# Patient Record
Sex: FEMALE | Race: WHITE | NOT HISPANIC OR LATINO | Employment: FULL TIME | URBAN - METROPOLITAN AREA
[De-identification: names, ages, dates, MRNs, and addresses within clinical notes are randomized per-mention and may not be internally consistent; named-entity substitution may affect disease eponyms.]

---

## 2019-04-12 ENCOUNTER — OFFICE VISIT (OUTPATIENT)
Dept: URGENT CARE | Age: 29
End: 2019-04-12
Payer: COMMERCIAL

## 2019-04-12 VITALS
DIASTOLIC BLOOD PRESSURE: 67 MMHG | OXYGEN SATURATION: 100 % | SYSTOLIC BLOOD PRESSURE: 130 MMHG | TEMPERATURE: 98 F | HEART RATE: 70 BPM | HEIGHT: 62 IN | RESPIRATION RATE: 18 BRPM | BODY MASS INDEX: 26.87 KG/M2 | WEIGHT: 146 LBS

## 2019-04-12 DIAGNOSIS — J02.9 ACUTE PHARYNGITIS, UNSPECIFIED ETIOLOGY: Primary | ICD-10-CM

## 2019-04-12 LAB — S PYO AG THROAT QL: NEGATIVE

## 2019-04-12 PROCEDURE — S9083 URGENT CARE CENTER GLOBAL: HCPCS | Performed by: FAMILY MEDICINE

## 2019-04-12 PROCEDURE — G0382 LEV 3 HOSP TYPE B ED VISIT: HCPCS | Performed by: FAMILY MEDICINE

## 2019-04-12 PROCEDURE — 87430 STREP A AG IA: CPT | Performed by: FAMILY MEDICINE

## 2019-04-12 RX ORDER — AMOXICILLIN 500 MG/1
500 CAPSULE ORAL EVERY 12 HOURS SCHEDULED
Qty: 20 CAPSULE | Refills: 0 | Status: SHIPPED | OUTPATIENT
Start: 2019-04-12 | End: 2019-04-22

## 2020-09-03 ENCOUNTER — HOSPITAL ENCOUNTER (OUTPATIENT)
Facility: HOSPITAL | Age: 30
Setting detail: OBSERVATION
Discharge: HOME | End: 2020-09-04
Admitting: INTERNAL MEDICINE
Payer: COMMERCIAL

## 2020-09-03 DIAGNOSIS — I49.1 PREMATURE ATRIAL COMPLEXES: ICD-10-CM

## 2020-09-03 DIAGNOSIS — R07.9 CHEST PAIN: Primary | ICD-10-CM

## 2020-09-03 DIAGNOSIS — F41.9 ANXIETY: ICD-10-CM

## 2020-09-03 DIAGNOSIS — I49.1 PAC (PREMATURE ATRIAL CONTRACTION): ICD-10-CM

## 2020-09-03 DIAGNOSIS — R94.31 PROLONGED Q-T INTERVAL ON ECG: ICD-10-CM

## 2020-09-03 PROBLEM — I49.9 ARRHYTHMIA: Status: ACTIVE | Noted: 2020-09-03

## 2020-09-03 LAB
ALBUMIN SERPL BCP-MCNC: 4.9 G/DL (ref 3.4–4.8)
ALP SERPL-CCNC: 41.8 U/L (ref 35–140)
ALT SERPL W P-5'-P-CCNC: 22 U/L (ref 5–54)
AMPHETAMINES SERPL QL SCN: NEGATIVE
ANION GAP SERPL CALCULATED.3IONS-SCNC: 11 MMOL/L (ref 4–13)
AST SERPL W P-5'-P-CCNC: 21 U/L (ref 15–41)
BARBITURATES UR QL: NEGATIVE
BASOPHILS # BLD AUTO: 0.03 THOUSANDS/ΜL (ref 0–0.1)
BASOPHILS NFR BLD AUTO: 0 % (ref 0–1)
BENZODIAZ UR QL: NEGATIVE
BILIRUB SERPL-MCNC: 0.67 MG/DL (ref 0.3–1.2)
BUN SERPL-MCNC: 8 MG/DL (ref 6–20)
CALCIUM SERPL-MCNC: 9.7 MG/DL (ref 8.4–10.2)
CHLORIDE SERPL-SCNC: 102 MMOL/L (ref 96–108)
CHOLEST SERPL-MCNC: 197 MG/DL
CO2 SERPL-SCNC: 26 MMOL/L (ref 22–33)
COCAINE UR QL: NEGATIVE
CREAT SERPL-MCNC: 0.69 MG/DL (ref 0.4–1.1)
CRP SERPL QL: 0.1 MG/L (ref 0–1)
D DIMER PPP FEU-MCNC: <0.19 MG/L FEU (ref 0.19–0.49)
EOSINOPHIL # BLD AUTO: 0.04 THOUSAND/ΜL (ref 0–0.61)
EOSINOPHIL NFR BLD AUTO: 0 % (ref 0–6)
ERYTHROCYTE [DISTWIDTH] IN BLOOD BY AUTOMATED COUNT: 11.9 % (ref 11.6–15.1)
ERYTHROCYTE [SEDIMENTATION RATE] IN BLOOD: 16 MM/HOUR (ref 0–20)
GFR SERPL CREATININE-BSD FRML MDRD: 116 ML/MIN/1.73SQ M
GLUCOSE SERPL-MCNC: 80 MG/DL (ref 65–140)
HCT VFR BLD AUTO: 38.3 % (ref 34.8–46.1)
HDLC SERPL-MCNC: 65 MG/DL
HGB BLD-MCNC: 12.8 G/DL (ref 11.5–15.4)
IMM GRANULOCYTES # BLD AUTO: 0.01 THOUSAND/UL (ref 0–0.2)
IMM GRANULOCYTES NFR BLD AUTO: 0 % (ref 0–2)
LDLC SERPL CALC-MCNC: 118 MG/DL (ref 0–100)
LYMPHOCYTES # BLD AUTO: 2.39 THOUSANDS/ΜL (ref 0.6–4.47)
LYMPHOCYTES NFR BLD AUTO: 26 % (ref 14–44)
MAGNESIUM SERPL-MCNC: 1.8 MG/DL (ref 1.6–2.6)
MCH RBC QN AUTO: 32.6 PG (ref 26.8–34.3)
MCHC RBC AUTO-ENTMCNC: 33.4 G/DL (ref 31.4–37.4)
MCV RBC AUTO: 98 FL (ref 82–98)
METHADONE UR QL: NEGATIVE
MONOCYTES # BLD AUTO: 0.47 THOUSAND/ΜL (ref 0.17–1.22)
MONOCYTES NFR BLD AUTO: 5 % (ref 4–12)
NEUTROPHILS # BLD AUTO: 6.22 THOUSANDS/ΜL (ref 1.85–7.62)
NEUTS SEG NFR BLD AUTO: 69 % (ref 43–75)
NONHDLC SERPL-MCNC: 132 MG/DL
OPIATES UR QL SCN: NEGATIVE
OXYCODONE+OXYMORPHONE UR QL SCN: NEGATIVE
PCP UR QL: NEGATIVE
PHOSPHATE SERPL-MCNC: 3.8 MG/DL (ref 2.5–5)
PLATELET # BLD AUTO: 369 THOUSANDS/UL (ref 149–390)
PMV BLD AUTO: 9.1 FL (ref 8.9–12.7)
POTASSIUM SERPL-SCNC: 3.7 MMOL/L (ref 3.5–5)
PROT SERPL-MCNC: 7.5 G/DL (ref 6.4–8.3)
RBC # BLD AUTO: 3.93 MILLION/UL (ref 3.81–5.12)
SODIUM SERPL-SCNC: 139 MMOL/L (ref 133–145)
THC UR QL: NEGATIVE
TRIGL SERPL-MCNC: 68.2 MG/DL
TROPONIN I SERPL-MCNC: <0.03 NG/ML (ref 0–0.07)
TROPONIN I SERPL-MCNC: <0.03 NG/ML (ref 0–0.07)
TSH SERPL DL<=0.05 MIU/L-ACNC: 1.06 UIU/ML (ref 0.34–5.6)
WBC # BLD AUTO: 9.16 THOUSAND/UL (ref 4.31–10.16)

## 2020-09-03 PROCEDURE — 85025 COMPLETE CBC W/AUTO DIFF WBC: CPT | Performed by: PHYSICIAN ASSISTANT

## 2020-09-03 PROCEDURE — 84484 ASSAY OF TROPONIN QUANT: CPT | Performed by: INTERNAL MEDICINE

## 2020-09-03 PROCEDURE — 84100 ASSAY OF PHOSPHORUS: CPT | Performed by: PHYSICIAN ASSISTANT

## 2020-09-03 PROCEDURE — 99449 NTRPROF PH1/NTRNET/EHR 31/>: CPT | Performed by: EMERGENCY MEDICINE

## 2020-09-03 PROCEDURE — 99220 PR INITIAL OBSERVATION CARE/DAY 70 MINUTES: CPT | Performed by: INTERNAL MEDICINE

## 2020-09-03 PROCEDURE — 36415 COLL VENOUS BLD VENIPUNCTURE: CPT | Performed by: PHYSICIAN ASSISTANT

## 2020-09-03 PROCEDURE — 99284 EMERGENCY DEPT VISIT MOD MDM: CPT | Performed by: PHYSICIAN ASSISTANT

## 2020-09-03 PROCEDURE — 99285 EMERGENCY DEPT VISIT HI MDM: CPT

## 2020-09-03 PROCEDURE — 83735 ASSAY OF MAGNESIUM: CPT | Performed by: PHYSICIAN ASSISTANT

## 2020-09-03 PROCEDURE — 84484 ASSAY OF TROPONIN QUANT: CPT | Performed by: PHYSICIAN ASSISTANT

## 2020-09-03 PROCEDURE — 85652 RBC SED RATE AUTOMATED: CPT | Performed by: INTERNAL MEDICINE

## 2020-09-03 PROCEDURE — 86140 C-REACTIVE PROTEIN: CPT | Performed by: INTERNAL MEDICINE

## 2020-09-03 PROCEDURE — 84443 ASSAY THYROID STIM HORMONE: CPT | Performed by: PHYSICIAN ASSISTANT

## 2020-09-03 PROCEDURE — 93005 ELECTROCARDIOGRAM TRACING: CPT

## 2020-09-03 PROCEDURE — 80307 DRUG TEST PRSMV CHEM ANLYZR: CPT | Performed by: PHYSICIAN ASSISTANT

## 2020-09-03 PROCEDURE — 85379 FIBRIN DEGRADATION QUANT: CPT | Performed by: PHYSICIAN ASSISTANT

## 2020-09-03 PROCEDURE — 80053 COMPREHEN METABOLIC PANEL: CPT | Performed by: PHYSICIAN ASSISTANT

## 2020-09-03 PROCEDURE — 80061 LIPID PANEL: CPT | Performed by: INTERNAL MEDICINE

## 2020-09-03 RX ORDER — ACETAMINOPHEN 325 MG/1
650 TABLET ORAL EVERY 4 HOURS PRN
Status: DISCONTINUED | OUTPATIENT
Start: 2020-09-03 | End: 2020-09-04 | Stop reason: HOSPADM

## 2020-09-03 RX ORDER — POTASSIUM CHLORIDE 20 MEQ/1
20 TABLET, EXTENDED RELEASE ORAL ONCE
Status: COMPLETED | OUTPATIENT
Start: 2020-09-03 | End: 2020-09-03

## 2020-09-03 RX ORDER — MULTIVITAMIN
1 TABLET ORAL DAILY
COMMUNITY

## 2020-09-03 RX ORDER — CALCIUM GLUCONATE 20 MG/ML
1 INJECTION, SOLUTION INTRAVENOUS ONCE
Status: DISCONTINUED | OUTPATIENT
Start: 2020-09-03 | End: 2020-09-04 | Stop reason: HOSPADM

## 2020-09-03 RX ADMIN — POTASSIUM CHLORIDE 20 MEQ: 1500 TABLET, EXTENDED RELEASE ORAL at 17:50

## 2020-09-03 RX ADMIN — ENOXAPARIN SODIUM 40 MG: 40 INJECTION SUBCUTANEOUS at 17:50

## 2020-09-03 NOTE — PLAN OF CARE
Problem: DISCHARGE PLANNING  Goal: Discharge to home or other facility with appropriate resources  Description: INTERVENTIONS:  - Identify barriers to discharge w/patient and caregiver  - Arrange for needed discharge resources and transportation as appropriate  - Identify discharge learning needs (meds, wound care, etc )  - Arrange for interpretive services to assist at discharge as needed  - Refer to Case Management Department for coordinating discharge planning if the patient needs post-hospital services based on physician/advanced practitioner order or complex needs related to functional status, cognitive ability, or social support system  Outcome: Progressing     Problem: Knowledge Deficit  Goal: Patient/family/caregiver demonstrates understanding of disease process, treatment plan, medications, and discharge instructions  Description: Complete learning assessment and assess knowledge base  Interventions:  - Provide teaching at level of understanding  - Provide teaching via preferred learning methods  Outcome: Progressing     Problem: NEUROSENSORY - ADULT  Goal: Achieves stable or improved neurological status  Description: INTERVENTIONS  - Monitor and report changes in neurological status  - Monitor vital signs such as temperature, blood pressure, glucose, and any other labs ordered   - Initiate measures to prevent increased intracranial pressure  - Monitor for seizure activity and implement precautions if appropriate      Outcome: Progressing  Goal: Achieves maximal functionality and self care  Description: INTERVENTIONS  - Monitor swallowing and airway patency with patient fatigue and changes in neurological status  - Encourage and assist patient to increase activity and self care     - Encourage visually impaired, hearing impaired and aphasic patients to use assistive/communication devices  Outcome: Progressing     Problem: CARDIOVASCULAR - ADULT  Goal: Maintains optimal cardiac output and hemodynamic stability  Description: INTERVENTIONS:  - Monitor I/O, vital signs and rhythm  - Monitor for S/S and trends of decreased cardiac output  - Administer and titrate ordered vasoactive medications to optimize hemodynamic stability  - Assess quality of pulses, skin color and temperature  - Assess for signs of decreased coronary artery perfusion  - Instruct patient to report change in severity of symptoms  Outcome: Progressing  Goal: Absence of cardiac dysrhythmias or at baseline rhythm  Description: INTERVENTIONS:  - Continuous cardiac monitoring, vital signs, obtain 12 lead EKG if ordered  - Administer antiarrhythmic and heart rate control medications as ordered  - Monitor electrolytes and administer replacement therapy as ordered  Outcome: Progressing

## 2020-09-03 NOTE — ASSESSMENT & PLAN NOTE
Chest pain, 4/10 in intensity and chest tightness, palpitation  Tingling feeling at her arms and right legs  Had been taking ""Maximum Oxy Elite Thermogenic Fat Burner" which contains caffeine, last use was 4 days ago  Troponin and D-dimer at presentation are negative  EKG showed PACs and mild QTc prolongation  Likely due to Anxiety vs  Panic attack vs  Unlikely ACS Vs  Unlikely Caffeine intoxication   -Medical toxicology has been consulted over the phone at ED, input appreciated  -24 hours telemetry  -repeat Troponin and EKG  -CRP and ESR, lipid panels     -echo

## 2020-09-03 NOTE — ASSESSMENT & PLAN NOTE
Hx of PAC since 2012  EKG today showed mild QTc prolongation  Electrolytes are unremarkable  -will monitor EKG

## 2020-09-03 NOTE — CONSULTS
PHONE [facility] 1981 Toxicology  Vi Tobin 31 y o  female MRN: [id number]  Unit/Bed#: ED 10 Encounter: [id number]      Reason for Consult / Principal Problem: Chest pain    Inpatient consult to Toxicology  Consult performed by: Kalani Cobos MD  Consult ordered by: Bhaskar Ritchie PA-C        09/03/20      ASSESSMENT:  1) Chest pain  2) Palpitations  3) Prolonged QTc on EKG  4) Paresthesias    DISCUSSION/ RECOMMENDATIONS:  The patient presented this afternoon with complaints of chest pain, palpitations, and paresthesias which started this morning  Noted in the emergency department to have PAC's and mildly prolonged QTc on EKG  She had started taking a supplement called "Maximum Oxy Elite Thermogenic Fat Burner" two weeks ago, but last took it four days ago  She is not on any medications at home  I was consulted regarding whether the supplement could be cause of the patient's presentation today  I was able to review the supplement ingredients  Caffeine toxicity could certainly cause these clinical findings, especially secondary to hypokalemia, however I would not expect these effects to be seen more than 24 hours after ingestion  I would not expect any of the other ingredients to cause this overall picture  I do not think that the patient's presentation today is related to the supplement use assuming she is providing an accurate history  Please continue to pursue other possible causes  For further questions, please call [facility]  Service or Patient Access Center to reach the medical  on call  Hx and PE limited by the dynamics of a phone consultation  I have not personally interviewed or evaluated the patient, but only advised based on the information provided to me  Primary provider is responsible for all clinical decisions       Pertinent history, physical exam and clinical findings and course discussed: Vi Tobin is a 31y o  year old female who presents with chest pain, palpitations, paresthesias onset today and was found to have some PAC's and mildly prolonged QTc on workup  Electrolytes normal   Patient had been taking a supplement as noted above, starting 2 weeks ago with last use four days ago  Not on any home medications and did not endorse any other ingestions  Vital signs stable  Review of systems and physical exam not performed by me  Historical Information   History reviewed  No pertinent past medical history  History reviewed  No pertinent surgical history  Social History   Social History     Substance and Sexual Activity   Alcohol Use Yes     Social History     Substance and Sexual Activity   Drug Use Not Currently     Social History     Tobacco Use   Smoking Status Never Smoker   Smokeless Tobacco Never Used     History reviewed  No pertinent family history  Prior to Admission medications    Medication Sig Start Date End Date Taking? Authorizing Provider   Multiple Vitamin (multivitamin) tablet Take 1 tablet by mouth daily   Yes Historical Provider, MD       Current Facility-Administered Medications   Medication Dose Route Frequency    calcium gluconate 1 g in sodium chloride 0 9% 50 mL (premix)  1 g Intravenous Once       No Known Allergies    Objective     No intake or output data in the 24 hours ending 09/03/20 1413    Invasive Devices:   Peripheral IV 09/03/20 Left Hand (Active)   Site Assessment Clean;Dry; Intact 09/03/20 1304   Dressing Type Transparent 09/03/20 1304   Line Status Blood return noted; Saline locked; Flushed 09/03/20 1304       Vitals   Vitals:    09/03/20 1300 09/03/20 1306   BP:  137/75   TempSrc: Tympanic    Pulse: 77    Resp: 18    Temp: 98 8 °F (37 1 °C)          EKG, Pathology, and/or Other Studies: I have personally reviewed pertinent reports  Lab Results: I have personally reviewed pertinent reports        Labs:  Results from last 7 days   Lab Units 09/03/20  1319   WBC Thousand/uL 9 16 HEMOGLOBIN g/dL 12 8   HEMATOCRIT % 38 3   PLATELETS Thousands/uL 369   NEUTROS PCT % 69   LYMPHS PCT % 26   MONOS PCT % 5      Results from last 7 days   Lab Units 09/03/20  1319   POTASSIUM mmol/L 3 7   CHLORIDE mmol/L 102   CO2 mmol/L 26   BUN mg/dL 8   CREATININE mg/dL 0 69   CALCIUM mg/dL 9 7   ALK PHOS U/L 41 8   ALT U/L 22   AST U/L 21   MAGNESIUM mg/dL 1 8   PHOSPHORUS mg/dL 3 8              0   Lab Value Date/Time    TROPONINI <0 03 09/03/2020 1319             Invalid input(s): EXTPREGUR      Counseling / Coordination of Care  Total time spent today 31 minutes   This was a phone consultation

## 2020-09-03 NOTE — H&P
H&P- Emeka Grant 1990, 31 y o  female MRN: [id number]    Unit/Bed#: -01 Encounter: [id number]    Primary Care Provider: No primary care provider on file  Date and time admitted to hospital: 9/3/2020 12:57 PM        PAC (premature atrial contraction)  Assessment & Plan  Hx of PAC since 2012  EKG today showed mild QTc prolongation  Electrolytes are unremarkable  -will monitor EKG    * Chest pain  Assessment & Plan  Chest pain, 4/10 in intensity and chest tightness, palpitation  Tingling feeling at her arms and right legs  Had been taking ""Maximum Oxy Elite Thermogenic Fat Burner" which contains caffeine, last use was 4 days ago  Troponin and D-dimer at presentation are negative  EKG showed PACs and mild QTc prolongation  Likely due to Anxiety vs  Panic attack vs  Unlikely ACS Vs  Unlikely Caffeine intoxication  -24 hours telemetry  -repeat Troponin and EKG  -CRP and ESR, lipid panels  -echo         VTE Prophylaxis: Enoxaparin (Lovenox)  / sequential compression device   Code Status: Full code  POLST: There is no POLST form on file for this patient (pre-hospital)  Discussion with family: yes    Anticipated Length of Stay:  Patient will be admitted on an Observation basis with an anticipated length of stay of  1 midnights  Justification for Hospital Stay: chest pain      Chief Complaint:   Chest pain and tightness    History of Present Illness:    Emeka Grant is a 31 y o  female with past medical history of PACs and anxiety who presents with chest pain and chest tightness  Chest pain and tightness started this morning, 4/10 in intensity, constant, associated with dizziness, palpitation and nausea  Nothing makes the pain better or worse  Patient denies fever, shortness of breath, chills and persirations  Patient states is that she has had tingling sensation on bilateral arms and right lower leg for a couple days   Patient also stated she had been taking "Maximum Oxy Elite Thermogenic Fat Burner" which contains caffeine, last use was 4 days ago  At ED, EKG showed PACs and mild QTc  Troponin I negative  Review of Systems:    Review of Systems   Constitutional: Negative for chills and fever  HENT: Negative for congestion, rhinorrhea, sneezing and sore throat  Eyes: Negative for pain and discharge  Respiratory: Positive for chest tightness  Negative for cough, shortness of breath and wheezing  Cardiovascular: Positive for chest pain and palpitations  Negative for leg swelling  Gastrointestinal: Positive for nausea  Negative for abdominal pain and vomiting  Endocrine: Negative for polydipsia, polyphagia and polyuria  Genitourinary: Negative for flank pain, frequency and urgency  Musculoskeletal: Negative for arthralgias, back pain and joint swelling  Skin: Negative for color change and pallor  Neurological: Positive for dizziness  Negative for weakness, light-headedness and headaches  Psychiatric/Behavioral: Negative for agitation and confusion  The patient is nervous/anxious  Past Medical and Surgical History:     History reviewed  No pertinent past medical history  History reviewed  No pertinent surgical history  Meds/Allergies:    Prior to Admission medications    Medication Sig Start Date End Date Taking? Authorizing Provider   Multiple Vitamin (multivitamin) tablet Take 1 tablet by mouth daily   Yes Historical Provider, MD     I have reviewed home medications with patient personally      Allergies: No Known Allergies    Social History:     Marital Status: /Civil Union   Occupation:      Patient Pre-hospital Level of Mobility: Ambulatory    Substance Use History:   Social History     Substance and Sexual Activity   Alcohol Use Yes     Social History     Tobacco Use   Smoking Status Never Smoker   Smokeless Tobacco Never Used     Social History     Substance and Sexual Activity   Drug Use Not Currently       Family History:    History reviewed  No pertinent family history  Physical Exam:     Vitals:   Blood Pressure: 116/73 (09/03/20 1501)  Pulse: 72 (09/03/20 1501)  Temperature: 98 8 °F (37 1 °C) (09/03/20 1501)  Temp Source: Tympanic (09/03/20 1501)  Respirations: 18 (09/03/20 1501)  Weight - Scale: 60 6 kg (133 lb 11 2 oz) (09/03/20 1300)  SpO2: 95 % (09/03/20 1501)    Physical Exam  HENT:      Head: Normocephalic  Eyes:      Pupils: Pupils are equal, round, and reactive to light  Neck:      Musculoskeletal: Normal range of motion  Cardiovascular:      Rate and Rhythm: Normal rate and regular rhythm  Heart sounds: No murmur  Pulmonary:      Effort: Pulmonary effort is normal  No respiratory distress  Breath sounds: No wheezing or rales  Abdominal:      Palpations: Abdomen is soft  Tenderness: There is no abdominal tenderness  Musculoskeletal: Normal range of motion  General: No swelling  Skin:     General: Skin is warm  Neurological:      Mental Status: She is alert and oriented to person, place, and time  Psychiatric:         Mood and Affect: Mood normal              Additional Data:     Lab Results: I have personally reviewed pertinent reports  Results from last 7 days   Lab Units 09/03/20  1319   WBC Thousand/uL 9 16   HEMOGLOBIN g/dL 12 8   HEMATOCRIT % 38 3   PLATELETS Thousands/uL 369   NEUTROS PCT % 69   LYMPHS PCT % 26   MONOS PCT % 5   EOS PCT % 0     Results from last 7 days   Lab Units 09/03/20  1319   SODIUM mmol/L 139   POTASSIUM mmol/L 3 7   CHLORIDE mmol/L 102   CO2 mmol/L 26   BUN mg/dL 8   CREATININE mg/dL 0 69   ANION GAP mmol/L 11   CALCIUM mg/dL 9 7   ALBUMIN g/dL 4 9*   TOTAL BILIRUBIN mg/dL 0 67   ALK PHOS U/L 41 8   ALT U/L 22   AST U/L 21   GLUCOSE RANDOM mg/dL 80                       Imaging: I have personally reviewed pertinent reports        No orders to display       EKG, Pathology, and Other Studies Reviewed on Admission:   · EKG: PACs and mild QTc prolongation     Allscripts / Epic Records Reviewed: Yes     ** Please Note: This note has been constructed using a voice recognition system   **

## 2020-09-03 NOTE — ED PROVIDER NOTES
History  Chief Complaint   Patient presents with    Chest Pain     "chest pressure" and L arm tingling, since 1030, started while working at desk, (+) nausea, "I keep seeing light and dark"     69-year-old female with history of anxiety comes in today complaining of substernal chest pressure with bilateral left greater than right arm tingling, right leg tingling, dizziness and nausea that started this morning with intermittent visual disturbance that she describes as her vision getting light and dark  She reports that the chest pressure is nonradiating  She denies palpitations but feels as though her "heart is not beating enough "  She reports that she recently was taking diet pills for the last 2 weeks which she stopped taking on Sunday  She denies any concomitant use of laxatives, no self-induced vomiting or diarrhea  She was doing exercises on Monday including pushups  She denies any significant past medical history  No family history of heart disease  Not a smoker  Prior to Admission Medications   Prescriptions Last Dose Informant Patient Reported? Taking? Multiple Vitamin (multivitamin) tablet 9/2/2020 at Unknown time  Yes Yes   Sig: Take 1 tablet by mouth daily      Facility-Administered Medications: None       History reviewed  No pertinent past medical history  History reviewed  No pertinent surgical history  History reviewed  No pertinent family history  I have reviewed and agree with the history as documented  E-Cigarette/Vaping    E-Cigarette Use Never User      E-Cigarette/Vaping Substances     Social History     Tobacco Use    Smoking status: Never Smoker    Smokeless tobacco: Never Used   Substance Use Topics    Alcohol use: Yes    Drug use: Not Currently       Review of Systems   Constitutional: Negative for activity change  Eyes: Negative for visual disturbance  Respiratory: Negative for shortness of breath  Cardiovascular: Positive for chest pain  Musculoskeletal: Negative for back pain  Skin: Negative for color change  Neurological: Negative for dizziness and headaches  Psychiatric/Behavioral: Negative for behavioral problems  Physical Exam  Physical Exam  Constitutional:       General: She is in acute distress (Mildly anxious appearing)  Appearance: She is well-developed and normal weight  HENT:      Head: Normocephalic and atraumatic  Eyes:      Conjunctiva/sclera: Conjunctivae normal    Cardiovascular:      Rate and Rhythm: Normal rate  Rhythm irregular  Extrasystoles are present  Heart sounds: Normal heart sounds  Pulmonary:      Effort: Pulmonary effort is normal  No respiratory distress  Breath sounds: Normal breath sounds  Chest:      Chest wall: Tenderness (Reproduces complaint) present  Abdominal:      General: Bowel sounds are normal       Palpations: Abdomen is soft  Musculoskeletal: Normal range of motion  Skin:     General: Skin is warm and dry  Findings: No rash  Neurological:      Mental Status: She is alert and oriented to person, place, and time     Psychiatric:         Mood and Affect: Mood normal          Behavior: Behavior normal          Vital Signs  ED Triage Vitals   Temperature Pulse Respirations Blood Pressure SpO2   09/03/20 1300 09/03/20 1300 09/03/20 1300 09/03/20 1306 09/03/20 1300   98 8 °F (37 1 °C) 77 18 137/75 97 %      Temp Source Heart Rate Source Patient Position - Orthostatic VS BP Location FiO2 (%)   09/03/20 1300 09/03/20 1300 -- -- --   Tympanic Monitor         Pain Score       09/03/20 1300       4           Vitals:    09/03/20 1300 09/03/20 1306   BP:  137/75   Pulse: 77          Visual Acuity      ED Medications  Medications   calcium gluconate 1 g in sodium chloride 0 9% 50 mL (premix) (0 g Intravenous Hold 9/3/20 1330)   acetaminophen (TYLENOL) tablet 650 mg (has no administration in time range)   enoxaparin (LOVENOX) subcutaneous injection 40 mg (has no administration in time range)       Diagnostic Studies  Results Reviewed     Procedure Component Value Units Date/Time    Sedimentation rate, automated [[id number]]     Lab Status:  No result Specimen:  Blood     C-reactive protein [[id number]]     Lab Status:  No result Specimen:  Blood     Lipid panel [[id number]]     Lab Status:  No result Specimen:  Blood     Rapid drug screen, urine [[id number]] Collected:  09/03/20 1403    Lab Status: In process Specimen:  Urine, Clean Catch Updated:  09/03/20 1409    TSH [[id number]]  (Normal) Collected:  09/03/20 1319    Lab Status:  Final result Specimen:  Blood from Arm, Left Updated:  09/03/20 1407     TSH 3RD GENERATON 1 056 uIU/mL     Narrative:       Patients undergoing fluorescein dye angiography may retain small amounts of fluorescein in the body for 48-72 hours post procedure  Samples containing fluorescein can produce falsely depressed TSH values  If the patient had this procedure,a specimen should be resubmitted post fluorescein clearance        D-Dimer [[id number]]  (Abnormal) Collected:  09/03/20 1319    Lab Status:  Final result Specimen:  Blood from Arm, Left Updated:  09/03/20 1352     D-Dimer, Quant  <0 19 mg/L FEU     Comprehensive metabolic panel [[id number]]  (Abnormal) Collected:  09/03/20 1319    Lab Status:  Final result Specimen:  Blood from Arm, Left Updated:  09/03/20 1346     Sodium 139 mmol/L      Potassium 3 7 mmol/L      Chloride 102 mmol/L      CO2 26 mmol/L      ANION GAP 11 mmol/L      BUN 8 mg/dL      Creatinine 0 69 mg/dL      Glucose 80 mg/dL      Calcium 9 7 mg/dL      AST 21 U/L      ALT 22 U/L      Alkaline Phosphatase 41 8 U/L      Total Protein 7 5 g/dL      Albumin 4 9 g/dL      Total Bilirubin 0 67 mg/dL      eGFR 116 ml/min/1 73sq m     Narrative:       [facility] guidelines for Chronic Kidney Disease (CKD):     Stage 1 with normal or high GFR (GFR > 90 mL/min/1 73 square meters)    Stage 2 Mild CKD (GFR = 60-89 mL/min/1 73 square meters)    Stage 3A Moderate CKD (GFR = 45-59 mL/min/1 73 square meters)    Stage 3B Moderate CKD (GFR = 30-44 mL/min/1 73 square meters)    Stage 4 Severe CKD (GFR = 15-29 mL/min/1 73 square meters)    Stage 5 End Stage CKD (GFR <15 mL/min/1 73 square meters)  Note: GFR calculation is accurate only with a steady state creatinine    Troponin I [[id number]]  (Normal) Collected:  09/03/20 1319    Lab Status:  Final result Specimen:  Blood from Arm, Left Updated:  09/03/20 1346     Troponin I <0 03 ng/mL     Magnesium [[id number]]  (Normal) Collected:  09/03/20 1319    Lab Status:  Final result Specimen:  Blood from Arm, Left Updated:  09/03/20 1346     Magnesium 1 8 mg/dL     Phosphorus [[id number]]  (Normal) Collected:  09/03/20 1319    Lab Status:  Final result Specimen:  Blood from Arm, Left Updated:  09/03/20 1346     Phosphorus 3 8 mg/dL     CBC and differential [[id number]]  (Normal) Collected:  09/03/20 1319    Lab Status:  Final result Specimen:  Blood from Arm, Left Updated:  09/03/20 1331     WBC 9 16 Thousand/uL      RBC 3 93 Million/uL      Hemoglobin 12 8 g/dL      Hematocrit 38 3 %      MCV 98 fL      MCH 32 6 pg      MCHC 33 4 g/dL      RDW 11 9 %      MPV 9 1 fL      Platelets 094 Thousands/uL      Neutrophils Relative 69 %      Immat GRANS % 0 %      Lymphocytes Relative 26 %      Monocytes Relative 5 %      Eosinophils Relative 0 %      Basophils Relative 0 %      Neutrophils Absolute 6 22 Thousands/µL      Immature Grans Absolute 0 01 Thousand/uL      Lymphocytes Absolute 2 39 Thousands/µL      Monocytes Absolute 0 47 Thousand/µL      Eosinophils Absolute 0 04 Thousand/µL      Basophils Absolute 0 03 Thousands/µL                  No orders to display              Procedures  Procedures         ED Course  ED Course as of Sep 03 1426   Thu Sep 03, 2020   1305 EKG performed at 12:58 p m   Shows normal sinus rhythm with rate of 95, normal axis, no ectopy, no significant ST elevations, however there is a prolonged QT with a QTC of 507      1316 EKG performed at 1:14 p m  Shows normal sinus rhythm with a rate of 81, normal axis, no ectopy, frequent PACs with compensatory pause, prolonged QT      1408 Speaking with Dr Radha Whitney, toxicology does not think the supplements are contributing to her prolonged QT or PACs especially 4 days later  MDM      Disposition  Final diagnoses:   Chest pain   Prolonged Q-T interval on ECG   Premature atrial complexes     Time reflects when diagnosis was documented in both MDM as applicable and the Disposition within this note     Time User Action Codes Description Comment    9/3/2020  2:22 PM Viona Phy Add [R07 9] Chest pain     9/3/2020  2:23 PM Viona Phy Add [R94 31] Prolonged Q-T interval on ECG     9/3/2020  2:24 PM Viona Phy Add [I49 1] Premature atrial complexes       ED Disposition     ED Disposition Condition Date/Time Comment    Admit Stable Thu Sep 3, 2020  2:22 PM Case was discussed with Dr Tigist Hendricks and the patient's admission status was agreed to be Admission Status: observation status to the service of Dr Tigist Hendricks   Follow-up Information    None         Patient's Medications   Discharge Prescriptions    No medications on file     No discharge procedures on file      PDMP Review     None          ED Provider  Electronically Signed by           Bri Daniels PA-C  09/03/20 4660

## 2020-09-04 ENCOUNTER — APPOINTMENT (OUTPATIENT)
Dept: NON INVASIVE DIAGNOSTICS | Facility: HOSPITAL | Age: 30
End: 2020-09-04
Payer: COMMERCIAL

## 2020-09-04 VITALS
OXYGEN SATURATION: 98 % | HEIGHT: 62 IN | SYSTOLIC BLOOD PRESSURE: 111 MMHG | HEART RATE: 76 BPM | TEMPERATURE: 97.4 F | BODY MASS INDEX: 24.56 KG/M2 | WEIGHT: 133.47 LBS | DIASTOLIC BLOOD PRESSURE: 67 MMHG | RESPIRATION RATE: 16 BRPM

## 2020-09-04 LAB
ATRIAL RATE: 83 BPM
ATRIAL RATE: 94 BPM
P AXIS: 43 DEGREES
P AXIS: 74 DEGREES
PR INTERVAL: 151 MS
PR INTERVAL: 186 MS
QRS AXIS: 73 DEGREES
QRS AXIS: 75 DEGREES
QRSD INTERVAL: 88 MS
QRSD INTERVAL: 91 MS
QT INTERVAL: 403 MS
QT INTERVAL: 411 MS
QTC INTERVAL: 477 MS
QTC INTERVAL: 507 MS
T WAVE AXIS: 34 DEGREES
T WAVE AXIS: 56 DEGREES
VENTRICULAR RATE: 81 BPM
VENTRICULAR RATE: 95 BPM

## 2020-09-04 PROCEDURE — 93306 TTE W/DOPPLER COMPLETE: CPT

## 2020-09-04 PROCEDURE — 99217 PR OBSERVATION CARE DISCHARGE MANAGEMENT: CPT | Performed by: INTERNAL MEDICINE

## 2020-09-04 PROCEDURE — 93306 TTE W/DOPPLER COMPLETE: CPT | Performed by: INTERNAL MEDICINE

## 2020-09-04 PROCEDURE — 93010 ELECTROCARDIOGRAM REPORT: CPT | Performed by: INTERNAL MEDICINE

## 2020-09-04 RX ORDER — ESCITALOPRAM OXALATE 10 MG/1
10 TABLET ORAL DAILY
Status: DISCONTINUED | OUTPATIENT
Start: 2020-09-04 | End: 2020-09-04 | Stop reason: HOSPADM

## 2020-09-04 RX ORDER — ALPRAZOLAM 0.25 MG/1
0.25 TABLET ORAL 3 TIMES DAILY PRN
Qty: 20 TABLET | Refills: 0 | Status: SHIPPED | OUTPATIENT
Start: 2020-09-04 | End: 2022-05-16

## 2020-09-04 RX ORDER — ALPRAZOLAM 0.25 MG/1
0.25 TABLET ORAL 3 TIMES DAILY PRN
Status: DISCONTINUED | OUTPATIENT
Start: 2020-09-04 | End: 2020-09-04 | Stop reason: HOSPADM

## 2020-09-04 RX ADMIN — ENOXAPARIN SODIUM 40 MG: 40 INJECTION SUBCUTANEOUS at 08:52

## 2020-09-04 RX ADMIN — ESCITALOPRAM OXALATE 10 MG: 10 TABLET ORAL at 12:30

## 2020-09-04 NOTE — DISCHARGE SUMMARY
Discharge Summary - Mignon Hernandez 31 y o  female MRN: [id number]    Unit/Bed#: -01 Encounter: [id number]    Admission Date:   Admission Orders (From admission, onward)     Ordered        09/03/20 1420  Place in Observation  Once                     Admitting Diagnosis: Blurred vision [H53 8]  Premature atrial complexes [I49 1]  Chest pain [R07 9]  Chest pressure [R07 89]  Prolonged Q-T interval on ECG [R94 31]  Numbness and tingling in left arm [R20 0, R20 2]    HPI:   Patient is a 48-year-old female with history of PACs and anxiety presents with complaints of midsternal chest pain, states that the pain is is for intensity 4/10 sharp in nature no specific aggravating or relieving factors  Patient also states that she has tingling sensation in the feet and both the hands and also associated with dizziness and palpitation and blurry vision  Patient denies shortness of breath or fever or chills or cough  Patient has also been supplement which contains caffeine and vitamin B12  Patient states that she has been stressed out at work  Procedures Performed: No orders of the defined types were placed in this encounter  Summary of Hospital Course:  Patient was admitted and observed for more than 24 hours  Chest pain subsided  Troponin trended and remained negative  No EKG changes  No major abnormalities on telemetry  QTC prolongation of normalized  Echocardiogram done  EF of 60%  No regional wall motion abnormality  No right-sided systolic dysfunction  No major valvular abnormalities  Blood work reviewed  TSH, CRP, ESR are within normal limit  Electrolytes within normal limit  She was evaluated by Psychiatric for anxiety and depression  Patient needs to have outpatient follow-up for the same  She was given a script for metoprolol 12 5 b i d  And Xanax  Patient is advised to follow-up with primary care physician regarding hospitalization        Complications: None    Discharge Diagnosis: Atypical chest pain, anxiety, occasional PACs    Resolved Problems  Date Reviewed: 9/3/2020    None          Condition at Discharge: good         Discharge instructions/Information to patient and family:   See after visit summary for information provided to patient and family  Provisions for Follow-Up Care:  See after visit summary for information related to follow-up care and any pertinent home health orders  PCP: No primary care provider on file  Disposition: Home    Planned Readmission: NO    Discharge Statement   I spent 45 minutes discharging the patient  This time was spent on the day of discharge  I had direct contact with the patient on the day of discharge  Additional documentation is required if more than 30 minutes were spent on discharge  Discharge Medications:  See after visit summary for reconciled discharge medications provided to patient and family

## 2020-09-04 NOTE — NURSING NOTE
Reviewed pts discharge instructions  Reveiwed with pt her follow up appts  And explained to pt how to set up for [facility] my chart to keep track of all her health care needs  Using teachback method RN educated pt on her new medications, doses, times and side effects  Pt asked appropriate questions and showed learning of medications  SLIM MD discussed with pt her ECHO results   at bedside

## 2020-09-04 NOTE — PROGRESS NOTES
RN noted irregularity with pt's heart beat upon am ascultation/assessment    SLIM attending made aware  No new orders at this time  [facility] ANTOINE  Pt's significant other present at bedside  Call bell with in reach

## 2020-09-04 NOTE — PLAN OF CARE
Problem: DISCHARGE PLANNING  Goal: Discharge to home or other facility with appropriate resources  Description: INTERVENTIONS:  - Identify barriers to discharge w/patient and caregiver  - Arrange for needed discharge resources and transportation as appropriate  - Identify discharge learning needs (meds, wound care, etc )  - Arrange for interpretive services to assist at discharge as needed  - Refer to Case Management Department for coordinating discharge planning if the patient needs post-hospital services based on physician/advanced practitioner order or complex needs related to functional status, cognitive ability, or social support system  9/4/2020 1344 by Isabelle Carrasco RN  Outcome: Adequate for Discharge  9/4/2020 0805 by Isabelle Carrasco RN  Outcome: Progressing     Problem: Knowledge Deficit  Goal: Patient/family/caregiver demonstrates understanding of disease process, treatment plan, medications, and discharge instructions  Description: Complete learning assessment and assess knowledge base    Interventions:  - Provide teaching at level of understanding  - Provide teaching via preferred learning methods  9/4/2020 1344 by Isabelle Carrasco RN  Outcome: Adequate for Discharge  9/4/2020 0805 by Isabelle Carrasco RN  Outcome: Progressing     Problem: NEUROSENSORY - ADULT  Goal: Achieves stable or improved neurological status  Description: INTERVENTIONS  - Monitor and report changes in neurological status  - Monitor vital signs such as temperature, blood pressure, glucose, and any other labs ordered   - Initiate measures to prevent increased intracranial pressure  - Monitor for seizure activity and implement precautions if appropriate      9/4/2020 1344 by Isabelle Carrasco RN  Outcome: Adequate for Discharge  9/4/2020 0805 by Isabelle Carrasco RN  Outcome: Progressing  Goal: Achieves maximal functionality and self care  Description: INTERVENTIONS  - Monitor swallowing and airway patency with patient fatigue and changes in neurological status  - Encourage and assist patient to increase activity and self care     - Encourage visually impaired, hearing impaired and aphasic patients to use assistive/communication devices  9/4/2020 1344 by Elaine Soler RN  Outcome: Adequate for Discharge  9/4/2020 0805 by Elaine Soler RN  Outcome: Progressing     Problem: CARDIOVASCULAR - ADULT  Goal: Maintains optimal cardiac output and hemodynamic stability  Description: INTERVENTIONS:  - Monitor I/O, vital signs and rhythm  - Monitor for S/S and trends of decreased cardiac output  - Administer and titrate ordered vasoactive medications to optimize hemodynamic stability  - Assess quality of pulses, skin color and temperature  - Assess for signs of decreased coronary artery perfusion  - Instruct patient to report change in severity of symptoms  9/4/2020 1344 by Elaine Soler RN  Outcome: Adequate for Discharge  9/4/2020 0805 by Elaine Soler RN  Outcome: Progressing  Goal: Absence of cardiac dysrhythmias or at baseline rhythm  Description: INTERVENTIONS:  - Continuous cardiac monitoring, vital signs, obtain 12 lead EKG if ordered  - Administer antiarrhythmic and heart rate control medications as ordered  - Monitor electrolytes and administer replacement therapy as ordered  9/4/2020 1344 by Elaine Soler RN  Outcome: Adequate for Discharge  9/4/2020 0805 by Elaine Soler RN  Outcome: Progressing

## 2020-09-04 NOTE — CONSULTS
Consultation - [facility] 31 y o  female MRN: [id number]    Unit/Bed#: -01 Encounter: [id number]      Identifying Data:  31years old female is admitted at [facility] with complaining of chest pain psychiatric consultation is asked for depression anxiety and stress  Patient examined spoke with the nurse discussed with medical attending Dr Kiesha Alicea and resident physician  Reportedly physically patient seems to be okay and she has no chronic medical conditions she exercise and takes vitamins she is not on any medications at home  Spoke with the  at bedside and patient examined she seems little minimizing her problem but she was able to tell me that she has ongoing work stress even though she likes her job and she has this job since 2 years this is a  job and she feels that it is responsible job and that makes her stress level high  She also worries about the kids and there schooling with all this COVID-19 pandemic is going on  She also gives a history of she is not talking with her father since 3 years and parents have been  mother left him  Otherwise patient has a happy marriage they are  for 13 years they have 2 daughters and they get along pretty good  Patient admits taking Xanax for anxiety in 2012 for several months to handle the anxiety and stress of work 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 and she was worried to make a mistake in passing the meds etc   Social history patient lives with the  and her 2 daughters patient denies smoking denies abusing alcohol or drugs patient's drug screen is negative and alcohol level was not done  Patient had some college education and she is currently working  Patient has no history of substance abuse no legal problems in the past     Chief Complaints:  Chest pain and stress    Family History: History reviewed  No pertinent family history    Sister and brother both have a depression with psychiatric admissions  Patient could not provide much detail because she does not know  Legal History:  Patient denies    Mental Status Exam:  31years old white female is alert awake oriented x3 memory intact looks anxious withdrawn psychomotor retardation speech slow and low in tone she gets anxious and nervous uptight with vague somatic complaints like chest pain tingling of fingers lot of psychosomatic complaints off and on without any physical explanation  Poor sleep appetite okay no weight change  Patient denies auditory or visual hallucinations  Patient denies suicidal or homicidal ideation  Patient seems obsessive and worries about the things a front before anything bad happens  She gets anxious with somatic complaints  No full-blown panic attacks  No paranoia no delusion elucidated  Poor concentration  Insight and judgments are adequate  Patient feels sad and uptight no crying no suicidal ideation  Patient is not pregnant  History of Present Illness     HPI: González Soliman is a 31y o  year old female who presents with chest pain    Consults      Historical Information   Past Psychiatric History:  Patient seems to be having a and anxiety since long time with history of treated by family physician giving her Xanax for few months in 2012 to handle the stress and anxiety otherwise she has not seen a therapist or psychiatrist no psychiatric admissions no suicide attempts in the past no history of drug and alcohol abuse no IV drug abuse no legal problems and currently she is not under psychiatric care and she is not on any psychotropic medications  After the discussion of her condition and recommending anti depression and anxiety medication p r n  She agreed to try that we discussed the side effects of medications Lexapro and Xanax p r n  Monae Ramos Patient agreed to continue her medication with her family physician to handle the stress anxiety and obsession  Monae Ramos History reviewed   No pertinent past medical history  History reviewed  No pertinent surgical history  Social History   Social History     Substance and Sexual Activity   Alcohol Use Yes     Social History     Substance and Sexual Activity   Drug Use Not Currently     Social History     Tobacco Use   Smoking Status Never Smoker   Smokeless Tobacco Never Used       Meds/Allergies   current meds:   Current Facility-Administered Medications   Medication Dose Route Frequency    acetaminophen (TYLENOL) tablet 650 mg  650 mg Oral Q4H PRN    calcium gluconate 1 g in sodium chloride 0 9% 50 mL (premix)  1 g Intravenous Once    enoxaparin (LOVENOX) subcutaneous injection 40 mg  40 mg Subcutaneous Daily    and PTA meds:    Medications Prior to Admission   Medication    Multiple Vitamin (multivitamin) tablet     No Known Allergies    Objective   Vitals: Blood pressure 118/79, pulse 70, temperature 97 6 °F (36 4 °C), temperature source Tympanic, resp  rate 20, height 5' 2" (1 575 m), weight 60 5 kg (133 lb 7 5 oz), SpO2 95 %        Routine Lab Results:   Admission on 09/03/2020   Component Date Value Ref Range Status    WBC 09/03/2020 9 16  4 31 - 10 16 Thousand/uL Final    RBC 09/03/2020 3 93  3 81 - 5 12 Million/uL Final    Hemoglobin 09/03/2020 12 8  11 5 - 15 4 g/dL Final    Hematocrit 09/03/2020 38 3  34 8 - 46 1 % Final    MCV 09/03/2020 98  82 - 98 fL Final    MCH 09/03/2020 32 6  26 8 - 34 3 pg Final    MCHC 09/03/2020 33 4  31 4 - 37 4 g/dL Final    RDW 09/03/2020 11 9  11 6 - 15 1 % Final    MPV 09/03/2020 9 1  8 9 - 12 7 fL Final    Platelets 98/65/7421 369  149 - 390 Thousands/uL Final    Neutrophils Relative 09/03/2020 69  43 - 75 % Final    Immat GRANS % 09/03/2020 0  0 - 2 % Final    Lymphocytes Relative 09/03/2020 26  14 - 44 % Final    Monocytes Relative 09/03/2020 5  4 - 12 % Final    Eosinophils Relative 09/03/2020 0  0 - 6 % Final    Basophils Relative 09/03/2020 0  0 - 1 % Final    Neutrophils Absolute 09/03/2020 6 22  1 85 - 7 62 Thousands/µL Final    Immature Grans Absolute 09/03/2020 0 01  0 00 - 0 20 Thousand/uL Final    Lymphocytes Absolute 09/03/2020 2 39  0 60 - 4 47 Thousands/µL Final    Monocytes Absolute 09/03/2020 0 47  0 17 - 1 22 Thousand/µL Final    Eosinophils Absolute 09/03/2020 0 04  0 00 - 0 61 Thousand/µL Final    Basophils Absolute 09/03/2020 0 03  0 00 - 0 10 Thousands/µL Final    Sodium 09/03/2020 139  133 - 145 mmol/L Final    Potassium 09/03/2020 3 7  3 5 - 5 0 mmol/L Final    Chloride 09/03/2020 102  96 - 108 mmol/L Final    CO2 09/03/2020 26  22 - 33 mmol/L Final    ANION GAP 09/03/2020 11  4 - 13 mmol/L Final    BUN 09/03/2020 8  6 - 20 mg/dL Final    Creatinine 09/03/2020 0 69  0 40 - 1 10 mg/dL Final    Standardized to IDMS reference method    Glucose 09/03/2020 80  65 - 140 mg/dL Final    If the patient is fasting, the ADA then defines impaired fasting glucose as > 100 mg/dL and diabetes as > or equal to 123 mg/dL  Specimen collection should occur prior to Sulfasalazine administration due to the potential for falsely depressed results  Specimen collection should occur prior to Sulfapyridine administration due to the potential for falsely elevated results   Calcium 09/03/2020 9 7  8 4 - 10 2 mg/dL Final    AST 09/03/2020 21  15 - 41 U/L Final    Specimen collection should occur prior to Sulfasalazine administration due to the potential for falsely depressed results   ALT 09/03/2020 22  5 - 54 U/L Final    Specimen collection should occur prior to Sulfasalazine administration due to the potential for falsely depressed results       Alkaline Phosphatase 09/03/2020 41 8  35 - 140 U/L Final    Total Protein 09/03/2020 7 5  6 4 - 8 3 g/dL Final    Albumin 09/03/2020 4 9* 3 4 - 4 8 g/dL Final    Total Bilirubin 09/03/2020 0 67  0 30 - 1 20 mg/dL Final    eGFR 09/03/2020 116  ml/min/1 73sq m Final    Magnesium 09/03/2020 1 8  1 6 - 2 6 mg/dL Final    Phosphorus 09/03/2020 3 8  2 5 - 5 0 mg/dL Final    Troponin I 09/03/2020 <0 03  0 00 - 0 07 ng/mL Final    TSH 3RD GENERATON 09/03/2020 1 056  0 340 - 5 600 uIU/mL Final    The recommended reference ranges for TSH during pregnancy are as follows:   First trimester   0 05-3 7   Second trimester  0 31-4 35   Third trimester   0 41-5 18    Note: Normal ranges may not apply to patients who are transgender, non-binary, or whose legal sex, sex at birth, and gender identity differ   D-Dimer, Quant  09/03/2020 <0 19* 0 19 - 0 49 mg/L FEU Final    Reference and upper limits to exclude DVT and PE are the same  Do not use to exclude if clinical symptoms are present   Amph/Meth UR 09/03/2020 Negative  Negative Final    Barbiturate Ur 09/03/2020 Negative  Negative Final    Benzodiazepine Urine 09/03/2020 Negative  Negative Final    Cocaine Urine 09/03/2020 Negative  Negative Final    Methadone Urine 09/03/2020 Negative  Negative Final    Opiate Urine 09/03/2020 Negative  Negative Final    PCP Ur 09/03/2020 Negative  Negative Final    THC Urine 09/03/2020 Negative  Negative Final    Oxycodone Urine 09/03/2020 Negative  Negative Final    Sed Rate 09/03/2020 16  0 - 20 mm/hour Final    CRP 09/03/2020 0 1  0 0 - 1 0 mg/L Final    Cholesterol 09/03/2020 197  <=200 mg/dL Final    Cholesterol:       Desirable         <200 mg/dl       Borderline         200-239 mg/dl       High              >239           Triglycerides 09/03/2020 68 2  <=150 mg/dL Final    Triglyceride:     Normal          <150 mg/dl     Borderline High 150-199 mg/dl     High            200-499 mg/dl        Very High       >499 mg/dl    Specimen collection should occur prior to N-Acetylcysteine or Metamizole administration due to the potential for falsely depressed results      HDL, Direct 09/03/2020 65  >=50 mg/dL Final    HDL Cholesterol:       Low     <41 mg/dL    LDL Calculated 09/03/2020 118* 0 - 100 mg/dL Final    LDL Cholesterol:     Optimal           <100 mg/dl     Near Optimal      100-129 mg/dl     Above Optimal       Borderline High 130-159 mg/dl       High            160-189 mg/dl       Very High       >189 mg/dl         This screening LDL is a calculated result  It does not have the accuracy of the Direct Measured LDL in the monitoring of patients with hyperlipidemia and/or statin therapy  Direct Measure LDL (BRE086) must be ordered separately in these patients   Non-HDL-Chol (CHOL-HDL) 09/03/2020 132  mg/dl Final    Troponin I 09/03/2020 <0 03  0 00 - 0 07 ng/mL Final    Ventricular Rate 09/03/2020 81  BPM Final    Atrial Rate 09/03/2020 83  BPM Final    [place] Interval 09/03/2020 151  ms Final    QRSD Interval 09/03/2020 91  ms Final    QT Interval 09/03/2020 411  ms Final    QTC Interval 09/03/2020 477  ms Final    P Axis 09/03/2020 43  degrees Final    QRS Axis 09/03/2020 73  degrees Final    T Wave [place] 09/03/2020 56  degrees Final    Ventricular Rate 09/03/2020 95  BPM Final    Atrial Rate 09/03/2020 94  BPM Final    [place] Interval 09/03/2020 186  ms Final    QRSD Interval 09/03/2020 88  ms Final    QT Interval 09/03/2020 403  ms Final    QTC Interval 09/03/2020 507  ms Final    P Axis 09/03/2020 74  degrees Final    QRS Axis 09/03/2020 75  degrees Final    T Wave [place] 09/03/2020 34  degrees Final         Diagnosis:  Major depression recurrent  Generalized anxiety  Rule out OCD    Plan:  Patient agreed to take Lexapro and Xanax p r n   Lexapro 10 mg daily  Xanax 0 25 mg p o  Q 8 hours p r n  For anxiety  Psychotherapy  Psychiatric follow-up recommended after the discharge  Discussed with the nurse resident physician and medical attending  I will follow up during the hospital stay      Marianna Bonilla MD

## 2020-09-04 NOTE — PLAN OF CARE
Problem: DISCHARGE PLANNING  Goal: Discharge to home or other facility with appropriate resources  Description: INTERVENTIONS:  - Identify barriers to discharge w/patient and caregiver  - Arrange for needed discharge resources and transportation as appropriate  - Identify discharge learning needs (meds, wound care, etc )  - Arrange for interpretive services to assist at discharge as needed  - Refer to Case Management Department for coordinating discharge planning if the patient needs post-hospital services based on physician/advanced practitioner order or complex needs related to functional status, cognitive ability, or social support system  Outcome: Progressing     Problem: Knowledge Deficit  Goal: Patient/family/caregiver demonstrates understanding of disease process, treatment plan, medications, and discharge instructions  Description: Complete learning assessment and assess knowledge base  Interventions:  - Provide teaching at level of understanding  - Provide teaching via preferred learning methods  Outcome: Progressing     Problem: NEUROSENSORY - ADULT  Goal: Achieves stable or improved neurological status  Description: INTERVENTIONS  - Monitor and report changes in neurological status  - Monitor vital signs such as temperature, blood pressure, glucose, and any other labs ordered   - Initiate measures to prevent increased intracranial pressure  - Monitor for seizure activity and implement precautions if appropriate      Outcome: Progressing  Goal: Achieves maximal functionality and self care  Description: INTERVENTIONS  - Monitor swallowing and airway patency with patient fatigue and changes in neurological status  - Encourage and assist patient to increase activity and self care     - Encourage visually impaired, hearing impaired and aphasic patients to use assistive/communication devices  Outcome: Progressing     Problem: CARDIOVASCULAR - ADULT  Goal: Maintains optimal cardiac output and hemodynamic stability  Description: INTERVENTIONS:  - Monitor I/O, vital signs and rhythm  - Monitor for S/S and trends of decreased cardiac output  - Administer and titrate ordered vasoactive medications to optimize hemodynamic stability  - Assess quality of pulses, skin color and temperature  - Assess for signs of decreased coronary artery perfusion  - Instruct patient to report change in severity of symptoms  Outcome: Progressing  Goal: Absence of cardiac dysrhythmias or at baseline rhythm  Description: INTERVENTIONS:  - Continuous cardiac monitoring, vital signs, obtain 12 lead EKG if ordered  - Administer antiarrhythmic and heart rate control medications as ordered  - Monitor electrolytes and administer replacement therapy as ordered  Outcome: Progressing     Problem: CARDIOVASCULAR - ADULT  Goal: Maintains optimal cardiac output and hemodynamic stability  Description: INTERVENTIONS:  - Monitor I/O, vital signs and rhythm  - Monitor for S/S and trends of decreased cardiac output  - Administer and titrate ordered vasoactive medications to optimize hemodynamic stability  - Assess quality of pulses, skin color and temperature  - Assess for signs of decreased coronary artery perfusion  - Instruct patient to report change in severity of symptoms  Outcome: Progressing  Goal: Absence of cardiac dysrhythmias or at baseline rhythm  Description: INTERVENTIONS:  - Continuous cardiac monitoring, vital signs, obtain 12 lead EKG if ordered  - Administer antiarrhythmic and heart rate control medications as ordered  - Monitor electrolytes and administer replacement therapy as ordered  Outcome: Progressing

## 2020-09-09 ENCOUNTER — OFFICE VISIT (OUTPATIENT)
Dept: CARDIOLOGY CLINIC | Facility: CLINIC | Age: 30
End: 2020-09-09
Payer: COMMERCIAL

## 2020-09-09 VITALS
BODY MASS INDEX: 25.43 KG/M2 | HEART RATE: 85 BPM | DIASTOLIC BLOOD PRESSURE: 70 MMHG | HEIGHT: 62 IN | OXYGEN SATURATION: 98 % | TEMPERATURE: 98.5 F | WEIGHT: 138.2 LBS | SYSTOLIC BLOOD PRESSURE: 116 MMHG

## 2020-09-09 DIAGNOSIS — F41.9 ANXIETY: ICD-10-CM

## 2020-09-09 DIAGNOSIS — R07.9 CHEST PAIN, UNSPECIFIED TYPE: Primary | ICD-10-CM

## 2020-09-09 DIAGNOSIS — I49.1 PAC (PREMATURE ATRIAL CONTRACTION): ICD-10-CM

## 2020-09-09 PROCEDURE — 99203 OFFICE O/P NEW LOW 30 MIN: CPT | Performed by: NURSE PRACTITIONER

## 2020-09-09 NOTE — PROGRESS NOTES
Cardiology Consultation     Demar Taveras  [id number]  1990  [facility] [place]  [facility] [address]      On 9/03/20 Ms Zully Arriola presented to [facility] with complaints of chest pain  She has a known past medical history of PAC's and anxiety  She To the emergency room with midsternal sharp pain  She complained of tingling sensation in her feet both associated with dizziness and palpitations with poor vision  She had been taking a supplement which contain caffeine and vitamin B12 she is under lot of stress at work  9/04/20 TTE showed LVEF 60%, no regional wall motion abnormality, trace MR, mild TR  Troponin was negative  TSH WNL  She was placed on Metoprolol 12 5mg BID and xanax  Ms Zully Arriola presents to our office for a cardiology consultation for recent complaints of chest pain  Ms Sybil Womack is accompanied by her   She admits to experiencing mid sternal chest pain last evening with activity, 3/10, radiating down her left arm  She admits to worsening fatigue  Ms Sybil Womack started taking OTC diet pills prior to start of her symptoms  She is not longer taking diet pills  She is not taking Metoprolol as prescribed in the ED  Social ETOH Use  Denies tobacco use  Denies family hx of CAD     Patient Active Problem List   Diagnosis    Chest pain    PAC (premature atrial contraction)     No past medical history on file    Social History     Socioeconomic History    Marital status: /Civil Union     Spouse name: Not on file    Number of children: Not on file    Years of education: Not on file    Highest education level: Not on file   Occupational History    Not on file   Social Needs    Financial resource strain: Not on file    Food insecurity     Worry: Not on file     Inability: Not on file    Transportation needs     Medical: Not on file     Non-medical: Not on file Tobacco Use    Smoking status: Never Smoker    Smokeless tobacco: Never Used   Substance and Sexual Activity    Alcohol use: Yes    Drug use: Not Currently    Sexual activity: Not on file   Lifestyle    Physical activity     Days per week: Not on file     Minutes per session: Not on file    Stress: Not on file   Relationships    Social connections     Talks on phone: Not on file     Gets together: Not on file     Attends Yazidi service: Not on file     Active member of club or organization: Not on file     Attends meetings of clubs or organizations: Not on file     Relationship status: Not on file    Intimate partner violence     Fear of current or ex partner: Not on file     Emotionally abused: Not on file     Physically abused: Not on file     Forced sexual activity: Not on file   Other Topics Concern    Not on file   Social History Narrative    Not on file      No family history on file  No past surgical history on file  Current Outpatient Medications:     ALPRAZolam (XANAX) 0 25 mg tablet, Take 1 tablet (0 25 mg total) by mouth 3 (three) times a day as needed for anxiety for up to 10 days, Disp: 20 tablet, Rfl: 0    metoprolol tartrate (LOPRESSOR) 25 mg tablet, Take 0 5 tablets (12 5 mg total) by mouth every 12 (twelve) hours, Disp: 30 tablet, Rfl: 0    Multiple Vitamin (multivitamin) tablet, Take 1 tablet by mouth daily, Disp: , Rfl:   No Known Allergies  There were no vitals filed for this visit      Labs:  Admission on 09/03/2020, Discharged on 09/04/2020   Component Date Value    WBC 09/03/2020 9 16     RBC 09/03/2020 3 93     Hemoglobin 09/03/2020 12 8     Hematocrit 09/03/2020 38 3     MCV 09/03/2020 98     MCH 09/03/2020 32 6     MCHC 09/03/2020 33 4     RDW 09/03/2020 11 9     MPV 09/03/2020 9 1     Platelets 43/74/1509 369     Neutrophils Relative 09/03/2020 69     Immat GRANS % 09/03/2020 0     Lymphocytes Relative 09/03/2020 26     Monocytes Relative 09/03/2020 5  Eosinophils Relative 09/03/2020 0     Basophils Relative 09/03/2020 0     Neutrophils Absolute 09/03/2020 6 22     Immature Grans Absolute 09/03/2020 0 01     Lymphocytes Absolute 09/03/2020 2 39     Monocytes Absolute 09/03/2020 0 47     Eosinophils Absolute 09/03/2020 0 04     Basophils Absolute 09/03/2020 0 03     Sodium 09/03/2020 139     Potassium 09/03/2020 3 7     Chloride 09/03/2020 102     CO2 09/03/2020 26     ANION GAP 09/03/2020 11     BUN 09/03/2020 8     Creatinine 09/03/2020 0 69     Glucose 09/03/2020 80     Calcium 09/03/2020 9 7     AST 09/03/2020 21     ALT 09/03/2020 22     Alkaline Phosphatase 09/03/2020 41 8     Total Protein 09/03/2020 7 5     Albumin 09/03/2020 4 9*    Total Bilirubin 09/03/2020 0 67     eGFR 09/03/2020 116     Magnesium 09/03/2020 1 8     Phosphorus 09/03/2020 3 8     Troponin I 09/03/2020 <0 03     TSH 3RD GENERATON 09/03/2020 1 056     D-Dimer, Quant  09/03/2020 <0 19*    Amph/Meth UR 09/03/2020 Negative     Barbiturate Ur 09/03/2020 Negative     Benzodiazepine Urine 09/03/2020 Negative     Cocaine Urine 09/03/2020 Negative     Methadone Urine 09/03/2020 Negative     Opiate Urine 09/03/2020 Negative     PCP Ur 09/03/2020 Negative     THC Urine 09/03/2020 Negative     Oxycodone Urine 09/03/2020 Negative     Sed Rate 09/03/2020 16     CRP 09/03/2020 0 1     Cholesterol 09/03/2020 197     Triglycerides 09/03/2020 68 2     HDL, Direct 09/03/2020 65     LDL Calculated 09/03/2020 118*    Non-HDL-Chol (CHOL-HDL) 09/03/2020 132     Troponin I 09/03/2020 <0 03     Ventricular Rate 09/03/2020 81     Atrial Rate 09/03/2020 83     RI Interval 09/03/2020 151     QRSD Interval 09/03/2020 91     QT Interval 09/03/2020 411     QTC Interval 09/03/2020 477     P Axis 09/03/2020 43     QRS Axis 09/03/2020 73     T Wave [place] 09/03/2020 56     Ventricular Rate 09/03/2020 95     Atrial Rate 09/03/2020 94     RI Interval 09/03/2020 186  QRSD Interval 09/03/2020 88     QT Interval 09/03/2020 403     QTC Interval 09/03/2020 507     P Axis 09/03/2020 74     QRS Axis 09/03/2020 75     T Wave [place] 09/03/2020 34      Imaging: No results found  Review of Systems:  Review of Systems   Constitutional: Positive for fatigue  Cardiovascular: Positive for chest pain  All other systems reviewed and are negative  Physical Exam:  Physical Exam  Constitutional:       Appearance: Normal appearance  HENT:      Head: Normocephalic  Neck:      Musculoskeletal: Normal range of motion  Cardiovascular:      Rate and Rhythm: Normal rate and regular rhythm  Pulmonary:      Effort: Pulmonary effort is normal       Breath sounds: Normal breath sounds  Abdominal:      General: Abdomen is flat  Bowel sounds are normal    Musculoskeletal: Normal range of motion  General: No swelling  Skin:     Capillary Refill: Capillary refill takes less than 2 seconds  Neurological:      General: No focal deficit present  Mental Status: She is alert and oriented to person, place, and time  Psychiatric:         Mood and Affect: Mood normal          Discussion/Summary:  1  Chest pain- Occurring with activity radiating down left arm, exercise stress test R/O ischemia  2  PAC's- TSH WNL, most likely from caffeine in the diet pills  On PE RRR, Stop metoprolol  3  Anxiety- possibly related to taking diet pills with caffeine, no longer taking diet pills,  follow up with PCP, instructed on a fresh diet, avoiding packaged foods

## 2020-09-18 ENCOUNTER — HOSPITAL ENCOUNTER (OUTPATIENT)
Dept: NON INVASIVE DIAGNOSTICS | Facility: CLINIC | Age: 30
Discharge: HOME | End: 2020-09-18
Payer: COMMERCIAL

## 2020-09-18 DIAGNOSIS — R07.9 CHEST PAIN, UNSPECIFIED TYPE: ICD-10-CM

## 2020-09-18 LAB
CHEST PAIN STATEMENT: NORMAL
MAX DIASTOLIC BP: 60 MMHG
MAX HEART RATE: 184 BPM
MAX PREDICTED HEART RATE: 189 BPM
MAX. SYSTOLIC BP: 140 MMHG
PROTOCOL NAME: NORMAL
REASON FOR TERMINATION: NORMAL
TARGET HR FORMULA: NORMAL
TIME IN EXERCISE PHASE: NORMAL

## 2020-09-18 PROCEDURE — 93017 CV STRESS TEST TRACING ONLY: CPT

## 2020-09-18 PROCEDURE — 93016 CV STRESS TEST SUPVJ ONLY: CPT | Performed by: INTERNAL MEDICINE

## 2020-09-18 PROCEDURE — 93018 CV STRESS TEST I&R ONLY: CPT | Performed by: INTERNAL MEDICINE

## 2020-09-21 ENCOUNTER — TELEPHONE (OUTPATIENT)
Dept: CARDIOLOGY CLINIC | Facility: CLINIC | Age: 30
End: 2020-09-21

## 2020-09-21 NOTE — TELEPHONE ENCOUNTER
----- Message from German Worthy, [address] sent at 9/21/2020  9:00 AM EDT -----  Please call Ms Sybil Womack and inform her the stress test was normal   Thank you  [employer] with pt re: normal ST rslts

## 2021-05-14 ENCOUNTER — OFFICE VISIT (OUTPATIENT)
Dept: FAMILY MEDICINE CLINIC | Facility: CLINIC | Age: 31
End: 2021-05-14
Payer: COMMERCIAL

## 2021-05-14 VITALS
SYSTOLIC BLOOD PRESSURE: 124 MMHG | BODY MASS INDEX: 28.05 KG/M2 | HEART RATE: 84 BPM | WEIGHT: 152.4 LBS | HEIGHT: 62 IN | DIASTOLIC BLOOD PRESSURE: 78 MMHG | RESPIRATION RATE: 16 BRPM | OXYGEN SATURATION: 98 % | TEMPERATURE: 98.5 F

## 2021-05-14 DIAGNOSIS — F41.9 ANXIETY: Primary | ICD-10-CM

## 2021-05-14 DIAGNOSIS — R00.2 PALPITATIONS: ICD-10-CM

## 2021-05-14 DIAGNOSIS — F41.0 PANIC ATTACKS: ICD-10-CM

## 2021-05-14 PROCEDURE — 1036F TOBACCO NON-USER: CPT | Performed by: PHYSICIAN ASSISTANT

## 2021-05-14 PROCEDURE — 3725F SCREEN DEPRESSION PERFORMED: CPT | Performed by: PHYSICIAN ASSISTANT

## 2021-05-14 PROCEDURE — 99214 OFFICE O/P EST MOD 30 MIN: CPT | Performed by: PHYSICIAN ASSISTANT

## 2021-05-14 RX ORDER — HYDROXYZINE HYDROCHLORIDE 25 MG/1
TABLET, FILM COATED ORAL
Qty: 30 TABLET | Refills: 1 | Status: SHIPPED | OUTPATIENT
Start: 2021-05-14 | End: 2021-09-29 | Stop reason: SDUPTHER

## 2021-05-14 RX ORDER — METOPROLOL SUCCINATE 25 MG/1
25 TABLET, EXTENDED RELEASE ORAL DAILY
Qty: 30 TABLET | Refills: 0 | Status: SHIPPED | OUTPATIENT
Start: 2021-05-14 | End: 2021-06-17 | Stop reason: SDUPTHER

## 2021-05-14 RX ORDER — VENLAFAXINE HYDROCHLORIDE 37.5 MG/1
37.5 CAPSULE, EXTENDED RELEASE ORAL
Qty: 90 CAPSULE | Refills: 0 | Status: SHIPPED | OUTPATIENT
Start: 2021-05-14 | End: 2021-06-17 | Stop reason: SDUPTHER

## 2021-05-14 NOTE — PROGRESS NOTES
Assessment/Plan:     -lab results from September 2020 have been reviewed  No need to repeat blood work at this time   - I did recommend starting Effexor 37 5 mg daily  I did advise her that if she is tolerating the medication okay and if she still has breakthrough symptoms that she should increase the dose to 2 tablets daily in 2-3 weeks if needed  -rather than prescribing Xanax which does have addicting tendencies I did talk to her about hydroxyzine 25 mg daily every 8 hours as needed which she did agree with  Avoid the medication of working or driving because of drowsy side effects   - I did advise her that the Effexor could possibly elevate blood pressure which will be monitored   - I also advised her to start metoprolol succinate 25 mg take 1/2 tablet daily  - I also advised her to reach out to counseling  She will check with her insurance regarding counselors  -recommend follow-up here in 1 month, sooner if needed    M*[employer] software was used to dictate this note  It may contain errors with dictating incorrect words/spelling  Please contact provider directly for any questions  BMI Counseling: Body mass index is 27 87 kg/m²  The BMI is above normal  Nutrition recommendations include reducing portion sizes and decreasing overall calorie intake  Exercise recommendations include exercising 3-5 times per week  Diagnoses and all orders for this visit:    Anxiety  -     venlafaxine (EFFEXOR-XR) 37 5 mg 24 hr capsule; Take 1 capsule (37 5 mg total) by mouth daily with breakfast  -     hydrOXYzine HCL (ATARAX) 25 mg tablet; Take 1 tablet every 8 hours as needed for anxiety    Panic attacks  -     venlafaxine (EFFEXOR-XR) 37 5 mg 24 hr capsule; Take 1 capsule (37 5 mg total) by mouth daily with breakfast  -     hydrOXYzine HCL (ATARAX) 25 mg tablet; Take 1 tablet every 8 hours as needed for anxiety    BMI 27 0-27 9,adult    Palpitations  -     metoprolol succinate (TOPROL-XL) 25 mg 24 hr tablet;  Take 1 tablet (25 mg total) by mouth daily          Subjective:      Patient ID: Emeka Grant is a 27 y o  female  Patient presents today for evaluation and treatment for anxiety/panic attacks  She states she has had anxiety for quite a few years  She was given treatment 2012 with Xanax as needed  She states her symptoms have been on a daily basis now over the last 6 months  She has multiple episodes during the day  She does not have an increasing heart rate and she does notice tingling down her left upper extremity  She states that she was admitted about a year ago because of the left arm symptoms and no cardiac findings were found  She states that she does get the tingling primarily when she is having anxiety attacks  She states about a year ago she was placed on metoprolol because of the palpitations and increasing heart rate  When she followed up with the specialist thereafter she was told to hold on taking the medication so she is not sure if it would of been effective  She has noticed that her blood pressure has been a little higher than usual   She only drinks about 1 cup of caffeinated coffee daily  She does not drink any other caffeine products  She states that she does exercise on a regular basis  She is has never tried reaching out to a counselor  She denies any suicidal ideations  She denies any symptoms of depression  She states that she purposely tries to keep herself busy to keep her mind off of things  She states that she has been having difficulty with her 15year-old daughter who will now be getting counseling  Patient states that she does not have difficulty falling asleep but she does have a tendency to wake up in middle the night and has a hard time falling back to sleep  The following portions of the patient's history were reviewed and updated as appropriate:   She  has a past medical history of Anxiety    She   Patient Active Problem List    Diagnosis Date Noted    BMI 27 0-27 9,adult 05/14/2021    Anxiety 05/14/2021    Panic attacks 05/14/2021    Palpitations 05/14/2021    Chest pain 09/03/2020    PAC (premature atrial contraction) 09/03/2020     She  has no past surgical history on file  Her family history is not on file  She  reports that she has never smoked  She has never used smokeless tobacco  She reports current alcohol use  She reports previous drug use  Current Outpatient Medications   Medication Sig Dispense Refill    Multiple Vitamin (multivitamin) tablet Take 1 tablet by mouth daily      ALPRAZolam (XANAX) 0 25 mg tablet Take 1 tablet (0 25 mg total) by mouth 3 (three) times a day as needed for anxiety for up to 10 days (Patient not taking: Reported on 9/9/2020) 20 tablet 0    hydrOXYzine HCL (ATARAX) 25 mg tablet Take 1 tablet every 8 hours as needed for anxiety 30 tablet 1    metoprolol succinate (TOPROL-XL) 25 mg 24 hr tablet Take 1 tablet (25 mg total) by mouth daily 30 tablet 0    venlafaxine (EFFEXOR-XR) 37 5 mg 24 hr capsule Take 1 capsule (37 5 mg total) by mouth daily with breakfast 90 capsule 0     No current facility-administered medications for this visit  Current Outpatient Medications on File Prior to Visit   Medication Sig    Multiple Vitamin (multivitamin) tablet Take 1 tablet by mouth daily    ALPRAZolam (XANAX) 0 25 mg tablet Take 1 tablet (0 25 mg total) by mouth 3 (three) times a day as needed for anxiety for up to 10 days (Patient not taking: Reported on 9/9/2020)     No current facility-administered medications on file prior to visit  She has No Known Allergies       Review of Systems   Constitutional: Negative  Respiratory: Negative for cough and shortness of breath  Cardiovascular: Positive for palpitations  Negative for chest pain     Psychiatric/Behavioral:          As stated in HPI         Objective:      /78 (BP Location: Left arm, Patient Position: Sitting, Cuff Size: Standard)   Pulse 84   Temp 98 5 °F (36 9 °C) (Tympanic)   Resp 16   Ht 5' 2" (1 575 m)   Wt 69 1 kg (152 lb 6 4 oz)   SpO2 98%   BMI 27 87 kg/m²          Physical Exam  Constitutional:       General: She is not in acute distress  Appearance: Normal appearance  She is well-developed  She is not ill-appearing, toxic-appearing or diaphoretic  HENT:      Head: Normocephalic and atraumatic  Right Ear: Tympanic membrane, ear canal and external ear normal       Left Ear: Tympanic membrane, ear canal and external ear normal    Neck:      Musculoskeletal: Neck supple  Thyroid: No thyromegaly  Cardiovascular:      Rate and Rhythm: Normal rate and regular rhythm  Heart sounds: Normal heart sounds  No murmur  No friction rub  No gallop  Pulmonary:      Effort: Pulmonary effort is normal  No respiratory distress  Breath sounds: Normal breath sounds  No wheezing, rhonchi or rales  Abdominal:      General: Bowel sounds are normal       Palpations: Abdomen is soft  There is no mass  Tenderness: There is no abdominal tenderness  Musculoskeletal:         General: No deformity  Lymphadenopathy:      Cervical: No cervical adenopathy  Skin:     General: Skin is warm  Neurological:      General: No focal deficit present  Mental Status: She is alert  Psychiatric:         Behavior: Behavior normal          Thought Content:  Thought content normal          Judgment: Judgment normal       Comments:  Patient did become teary-eyed during the evaluation

## 2021-06-17 ENCOUNTER — OFFICE VISIT (OUTPATIENT)
Dept: FAMILY MEDICINE CLINIC | Facility: CLINIC | Age: 31
End: 2021-06-17
Payer: COMMERCIAL

## 2021-06-17 VITALS
HEART RATE: 65 BPM | BODY MASS INDEX: 27.27 KG/M2 | SYSTOLIC BLOOD PRESSURE: 118 MMHG | WEIGHT: 148.2 LBS | DIASTOLIC BLOOD PRESSURE: 78 MMHG | RESPIRATION RATE: 16 BRPM | TEMPERATURE: 98.2 F | OXYGEN SATURATION: 98 % | HEIGHT: 62 IN

## 2021-06-17 DIAGNOSIS — F41.9 ANXIETY: ICD-10-CM

## 2021-06-17 DIAGNOSIS — F41.0 PANIC ATTACKS: ICD-10-CM

## 2021-06-17 DIAGNOSIS — R00.2 PALPITATIONS: ICD-10-CM

## 2021-06-17 PROCEDURE — 99051 MED SERV EVE/WKEND/HOLIDAY: CPT | Performed by: PHYSICIAN ASSISTANT

## 2021-06-17 PROCEDURE — 3008F BODY MASS INDEX DOCD: CPT | Performed by: PHYSICIAN ASSISTANT

## 2021-06-17 PROCEDURE — 99214 OFFICE O/P EST MOD 30 MIN: CPT | Performed by: PHYSICIAN ASSISTANT

## 2021-06-17 RX ORDER — VENLAFAXINE HYDROCHLORIDE 75 MG/1
75 CAPSULE, EXTENDED RELEASE ORAL
Qty: 90 CAPSULE | Refills: 1 | Status: SHIPPED | OUTPATIENT
Start: 2021-06-17 | End: 2021-09-29 | Stop reason: SDUPTHER

## 2021-06-17 RX ORDER — METOPROLOL SUCCINATE 25 MG/1
TABLET, EXTENDED RELEASE ORAL
Qty: 45 TABLET | Refills: 1 | Status: SHIPPED | OUTPATIENT
Start: 2021-06-17 | End: 2021-09-29 | Stop reason: SDUPTHER

## 2021-06-17 NOTE — PROGRESS NOTES
Assessment/Plan:     -she will continue on the metoprolol 25 mg 1/2 tablet daily  -I did increase her Effexor from 37 5 mg daily up to 75 mg daily   - I did recommend follow-up in 2 months to keep an eye on her blood pressure/vital signs along with her anxiety  - she will check with [facility] hard about the COVID-19 vaccine  M*Modal software was used to dictate this note  It may contain errors with dictating incorrect words/spelling  Please contact provider directly for any questions  Diagnoses and all orders for this visit:    Palpitations  -     metoprolol succinate (TOPROL-XL) 25 mg 24 hr tablet; Take 1/2 tablet daily    Anxiety  -     venlafaxine (EFFEXOR-XR) 75 mg 24 hr capsule; Take 1 capsule (75 mg total) by mouth daily with breakfast    Panic attacks  -     venlafaxine (EFFEXOR-XR) 75 mg 24 hr capsule; Take 1 capsule (75 mg total) by mouth daily with breakfast          Subjective:      Patient ID: Nicole Garcia is a 27 y o  female  Patient presents today for routine follow-up for anxiety/ palpitations  She states that she is feeling better on the metoprolol and the Effexor  At time she does increase the metoprolol to 1 tablet daily or the Effexor to 2 tablets daily  She states that she is able to manage her anxiety so much better at this time  She did not get counseling yet  The following portions of the patient's history were reviewed and updated as appropriate:   She  has a past medical history of Anxiety  She   Patient Active Problem List    Diagnosis Date Noted    BMI 27 0-27 9,adult 05/14/2021    Anxiety 05/14/2021    Panic attacks 05/14/2021    Palpitations 05/14/2021    Chest pain 09/03/2020    PAC (premature atrial contraction) 09/03/2020     She  has no past surgical history on file  Her family history is not on file  She  reports that she has never smoked  She has never used smokeless tobacco  She reports current alcohol use  She reports previous drug use    Current Outpatient Medications   Medication Sig Dispense Refill    hydrOXYzine HCL (ATARAX) 25 mg tablet Take 1 tablet every 8 hours as needed for anxiety 30 tablet 1    metoprolol succinate (TOPROL-XL) 25 mg 24 hr tablet Take 1/2 tablet daily 45 tablet 1    Multiple Vitamin (multivitamin) tablet Take 1 tablet by mouth daily      venlafaxine (EFFEXOR-XR) 75 mg 24 hr capsule Take 1 capsule (75 mg total) by mouth daily with breakfast 90 capsule 1    ALPRAZolam (XANAX) 0 25 mg tablet Take 1 tablet (0 25 mg total) by mouth 3 (three) times a day as needed for anxiety for up to 10 days (Patient not taking: Reported on 9/9/2020) 20 tablet 0     No current facility-administered medications for this visit  Current Outpatient Medications on File Prior to Visit   Medication Sig    hydrOXYzine HCL (ATARAX) 25 mg tablet Take 1 tablet every 8 hours as needed for anxiety    Multiple Vitamin (multivitamin) tablet Take 1 tablet by mouth daily    [DISCONTINUED] metoprolol succinate (TOPROL-XL) 25 mg 24 hr tablet Take 1 tablet (25 mg total) by mouth daily    [DISCONTINUED] venlafaxine (EFFEXOR-XR) 37 5 mg 24 hr capsule Take 1 capsule (37 5 mg total) by mouth daily with breakfast    ALPRAZolam (XANAX) 0 25 mg tablet Take 1 tablet (0 25 mg total) by mouth 3 (three) times a day as needed for anxiety for up to 10 days (Patient not taking: Reported on 9/9/2020)     No current facility-administered medications on file prior to visit  She has No Known Allergies       Review of Systems   Cardiovascular:          As stated in HPI   Psychiatric/Behavioral:         As stated in HPI         Objective:      /78 (BP Location: Left arm, Patient Position: Sitting, Cuff Size: Standard)   Pulse 65   Temp 98 2 °F (36 8 °C) (Tympanic)   Resp 16   Ht 5' 2" (1 575 m)   Wt 67 2 kg (148 lb 3 2 oz)   SpO2 98%   BMI 27 11 kg/m²          Physical Exam  Constitutional:       General: She is not in acute distress       Appearance: Normal appearance  She is well-developed  She is not ill-appearing, toxic-appearing or diaphoretic  HENT:      Head: Normocephalic and atraumatic  Right Ear: Tympanic membrane, ear canal and external ear normal       Left Ear: Tympanic membrane, ear canal and external ear normal    Neck:      Thyroid: No thyromegaly  Cardiovascular:      Rate and Rhythm: Normal rate and regular rhythm  Heart sounds: Normal heart sounds  No murmur heard  No friction rub  No gallop  Pulmonary:      Effort: Pulmonary effort is normal  No respiratory distress  Breath sounds: Normal breath sounds  No wheezing, rhonchi or rales  Abdominal:      General: Bowel sounds are normal       Palpations: Abdomen is soft  There is no mass  Tenderness: There is no abdominal tenderness  Musculoskeletal:         General: No deformity  Cervical back: Neck supple  Lymphadenopathy:      Cervical: No cervical adenopathy  Skin:     General: Skin is warm  Neurological:      General: No focal deficit present  Mental Status: She is alert  Psychiatric:         Mood and Affect: Mood normal          Behavior: Behavior normal          Thought Content:  Thought content normal          Judgment: Judgment normal

## 2021-08-26 ENCOUNTER — TELEPHONE (OUTPATIENT)
Dept: FAMILY MEDICINE CLINIC | Facility: CLINIC | Age: 31
End: 2021-08-26

## 2021-08-26 NOTE — TELEPHONE ENCOUNTER
Phone call to [facility] re:missed appt   Left msg to call the office to resched appt

## 2021-09-29 DIAGNOSIS — F41.9 ANXIETY: ICD-10-CM

## 2021-09-29 DIAGNOSIS — R00.2 PALPITATIONS: ICD-10-CM

## 2021-09-29 DIAGNOSIS — F41.0 PANIC ATTACKS: ICD-10-CM

## 2021-09-30 RX ORDER — HYDROXYZINE HYDROCHLORIDE 25 MG/1
TABLET, FILM COATED ORAL
Qty: 30 TABLET | Refills: 0 | Status: SHIPPED | OUTPATIENT
Start: 2021-09-30 | End: 2021-12-17

## 2021-09-30 RX ORDER — VENLAFAXINE HYDROCHLORIDE 75 MG/1
75 CAPSULE, EXTENDED RELEASE ORAL
Qty: 90 CAPSULE | Refills: 0 | Status: SHIPPED | OUTPATIENT
Start: 2021-09-30 | End: 2021-12-17 | Stop reason: SDUPTHER

## 2021-09-30 RX ORDER — METOPROLOL SUCCINATE 25 MG/1
TABLET, EXTENDED RELEASE ORAL
Qty: 45 TABLET | Refills: 0 | Status: SHIPPED | OUTPATIENT
Start: 2021-09-30 | End: 2021-12-09 | Stop reason: SDUPTHER

## 2021-11-30 ENCOUNTER — OFFICE VISIT (OUTPATIENT)
Dept: URGENT CARE | Age: 31
End: 2021-11-30
Payer: COMMERCIAL

## 2021-11-30 VITALS — HEART RATE: 106 BPM | TEMPERATURE: 97.7 F | RESPIRATION RATE: 20 BRPM | OXYGEN SATURATION: 96 %

## 2021-11-30 DIAGNOSIS — J02.9 SORE THROAT: Primary | ICD-10-CM

## 2021-11-30 LAB — S PYO AG THROAT QL: NEGATIVE

## 2021-11-30 PROCEDURE — 87070 CULTURE OTHR SPECIMN AEROBIC: CPT | Performed by: NURSE PRACTITIONER

## 2021-11-30 PROCEDURE — 99213 OFFICE O/P EST LOW 20 MIN: CPT | Performed by: NURSE PRACTITIONER

## 2021-11-30 PROCEDURE — 87880 STREP A ASSAY W/OPTIC: CPT | Performed by: NURSE PRACTITIONER

## 2021-11-30 PROCEDURE — S9088 SERVICES PROVIDED IN URGENT: HCPCS | Performed by: NURSE PRACTITIONER

## 2021-12-02 LAB — BACTERIA THROAT CULT: NORMAL

## 2021-12-09 DIAGNOSIS — F41.0 PANIC ATTACKS: ICD-10-CM

## 2021-12-09 DIAGNOSIS — R00.2 PALPITATIONS: ICD-10-CM

## 2021-12-09 DIAGNOSIS — F41.9 ANXIETY: ICD-10-CM

## 2021-12-09 RX ORDER — VENLAFAXINE HYDROCHLORIDE 75 MG/1
CAPSULE, EXTENDED RELEASE ORAL
Qty: 90 CAPSULE | Refills: 0 | OUTPATIENT
Start: 2021-12-09

## 2021-12-09 RX ORDER — METOPROLOL SUCCINATE 25 MG/1
TABLET, EXTENDED RELEASE ORAL
Qty: 45 TABLET | Refills: 0 | OUTPATIENT
Start: 2021-12-09

## 2021-12-09 RX ORDER — METOPROLOL SUCCINATE 25 MG/1
TABLET, EXTENDED RELEASE ORAL
Qty: 7 TABLET | Refills: 0 | Status: SHIPPED | OUTPATIENT
Start: 2021-12-09 | End: 2021-12-17 | Stop reason: SDUPTHER

## 2021-12-17 ENCOUNTER — OFFICE VISIT (OUTPATIENT)
Dept: FAMILY MEDICINE CLINIC | Facility: CLINIC | Age: 31
End: 2021-12-17

## 2021-12-17 VITALS
SYSTOLIC BLOOD PRESSURE: 118 MMHG | TEMPERATURE: 97.6 F | WEIGHT: 143 LBS | DIASTOLIC BLOOD PRESSURE: 60 MMHG | BODY MASS INDEX: 26.31 KG/M2 | OXYGEN SATURATION: 99 % | HEART RATE: 97 BPM | HEIGHT: 62 IN

## 2021-12-17 DIAGNOSIS — R00.2 PALPITATIONS: ICD-10-CM

## 2021-12-17 DIAGNOSIS — F43.21 SITUATIONAL DEPRESSION: ICD-10-CM

## 2021-12-17 DIAGNOSIS — F41.0 PANIC ATTACKS: ICD-10-CM

## 2021-12-17 DIAGNOSIS — I48.0 PAROXYSMAL ATRIAL FIBRILLATION (HCC): ICD-10-CM

## 2021-12-17 DIAGNOSIS — Z12.4 CERVICAL CANCER SCREENING: ICD-10-CM

## 2021-12-17 DIAGNOSIS — F51.01 PRIMARY INSOMNIA: ICD-10-CM

## 2021-12-17 DIAGNOSIS — Z23 IMMUNIZATION DUE: Primary | ICD-10-CM

## 2021-12-17 DIAGNOSIS — F41.9 ANXIETY: ICD-10-CM

## 2021-12-17 DIAGNOSIS — Z00.00 WELL ADULT EXAM: ICD-10-CM

## 2021-12-17 PROCEDURE — 99214 OFFICE O/P EST MOD 30 MIN: CPT | Performed by: PHYSICIAN ASSISTANT

## 2021-12-17 RX ORDER — VENLAFAXINE HYDROCHLORIDE 150 MG/1
150 CAPSULE, EXTENDED RELEASE ORAL
Qty: 90 CAPSULE | Refills: 1 | Status: SHIPPED | OUTPATIENT
Start: 2021-12-17 | End: 2022-05-20 | Stop reason: SDUPTHER

## 2021-12-17 RX ORDER — QUETIAPINE FUMARATE 50 MG/1
50 TABLET, FILM COATED ORAL
Qty: 90 TABLET | Refills: 1 | Status: SHIPPED | OUTPATIENT
Start: 2021-12-17 | End: 2022-05-20 | Stop reason: SDUPTHER

## 2021-12-17 RX ORDER — METOPROLOL SUCCINATE 25 MG/1
TABLET, EXTENDED RELEASE ORAL
Qty: 90 TABLET | Refills: 1 | Status: SHIPPED | OUTPATIENT
Start: 2021-12-17
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
## 2021-12-17 NOTE — PROGRESS NOTES
Assessment/Plan:    -flu vaccine today   -encouraged to get the COVID vaccine in 2 weeks  -Phone number given for gynecology for screening Pap smear   -I did suggest she find a dentist in [place] since she is currently living in [facility] for her cleaning  -routine physical in 1 year    M*Modal software was used to dictate this note  It may contain errors with dictating incorrect words/spelling  Please contact provider directly for any questions  Diagnoses and all orders for this visit:    Well adult exam          Subjective:      Patient ID: Zonia Kingsley is a 27 y o  female  Patient presents today for annual physical   She also has concerns that will be addressed as a separate visit today  She states that she has not seen the dentist in a little while  She states that she recently moved and hopes to establish with a dentist   I did suggest [place]   She does need a gynecologist for her screening Pap smears   She does see the eye doctor yearly  She does were glasses   Denies any hearing problems   She does exercise at least twice a week just doing strengthening exercises  No cardio   Denies smoking, alcohol, drug use or vaping  She did not get the COVID vaccine or flu vaccine      The following portions of the patient's history were reviewed and updated as appropriate:   She  has a past medical history of Anxiety  She   Patient Active Problem List    Diagnosis Date Noted    Well adult exam 12/17/2021    BMI 27 0-27 9,adult 05/14/2021    Anxiety 05/14/2021    Panic attacks 05/14/2021    Palpitations 05/14/2021    Chest pain 09/03/2020    PAC (premature atrial contraction) 09/03/2020     She  has no past surgical history on file  Her family history is not on file  She  reports that she has never smoked  She has never used smokeless tobacco  She reports current alcohol use  She reports previous drug use    Current Outpatient Medications   Medication Sig Dispense Refill    metoprolol succinate (TOPROL-XL) 25 mg 24 hr tablet Take 1/2 tablet daily 7 tablet 0    ALPRAZolam (XANAX) 0 25 mg tablet Take 1 tablet (0 25 mg total) by mouth 3 (three) times a day as needed for anxiety for up to 10 days (Patient not taking: Reported on 9/9/2020) 20 tablet 0    hydrOXYzine HCL (ATARAX) 25 mg tablet Take 1 tablet every 8 hours as needed for anxiety (Patient not taking: Reported on 11/30/2021 ) 30 tablet 0    Multiple Vitamin (multivitamin) tablet Take 1 tablet by mouth daily (Patient not taking: Reported on 11/30/2021 )      venlafaxine (EFFEXOR-XR) 75 mg 24 hr capsule Take 1 capsule (75 mg total) by mouth daily with breakfast (Patient not taking: Reported on 11/30/2021 ) 90 capsule 0     No current facility-administered medications for this visit  Current Outpatient Medications on File Prior to Visit   Medication Sig    metoprolol succinate (TOPROL-XL) 25 mg 24 hr tablet Take 1/2 tablet daily    ALPRAZolam (XANAX) 0 25 mg tablet Take 1 tablet (0 25 mg total) by mouth 3 (three) times a day as needed for anxiety for up to 10 days (Patient not taking: Reported on 9/9/2020)    hydrOXYzine HCL (ATARAX) 25 mg tablet Take 1 tablet every 8 hours as needed for anxiety (Patient not taking: Reported on 11/30/2021 )    Multiple Vitamin (multivitamin) tablet Take 1 tablet by mouth daily (Patient not taking: Reported on 11/30/2021 )    venlafaxine (EFFEXOR-XR) 75 mg 24 hr capsule Take 1 capsule (75 mg total) by mouth daily with breakfast (Patient not taking: Reported on 11/30/2021 )     No current facility-administered medications on file prior to visit  She has No Known Allergies       Review of Systems      Objective:      /60 (BP Location: Left arm, Patient Position: Sitting, Cuff Size: Standard)   Pulse 97   Temp 97 6 °F (36 4 °C) (Tympanic)   Ht 5' 2" (1 575 m)   Wt 64 9 kg (143 lb)   SpO2 99%   BMI 26 16 kg/m²          Physical Exam  Constitutional:       General: She is not in acute distress  Appearance: Normal appearance  She is well-developed  She is not ill-appearing, toxic-appearing or diaphoretic  HENT:      Head: Normocephalic and atraumatic  Right Ear: Tympanic membrane, ear canal and external ear normal       Left Ear: Tympanic membrane, ear canal and external ear normal    Neck:      Thyroid: No thyromegaly  Cardiovascular:      Rate and Rhythm: Normal rate and regular rhythm  Heart sounds: Normal heart sounds  No murmur heard  No friction rub  No gallop  Pulmonary:      Effort: Pulmonary effort is normal  No respiratory distress  Breath sounds: Normal breath sounds  No wheezing, rhonchi or rales  Abdominal:      General: Bowel sounds are normal       Palpations: Abdomen is soft  There is no mass  Tenderness: There is no abdominal tenderness  Musculoskeletal:         General: No deformity  Cervical back: Neck supple  Right lower leg: No edema  Left lower leg: No edema  Lymphadenopathy:      Cervical: No cervical adenopathy  Skin:     General: Skin is warm  Neurological:      General: No focal deficit present  Mental Status: She is alert  Psychiatric:         Mood and Affect: Mood normal          Behavior: Behavior normal          Thought Content:  Thought content normal          Judgment: Judgment normal

## 2021-12-20 PROCEDURE — 90682 RIV4 VACC RECOMBINANT DNA IM: CPT

## 2021-12-20 PROCEDURE — 90471 IMMUNIZATION ADMIN: CPT

## 2022-01-14 ENCOUNTER — OFFICE VISIT (OUTPATIENT)
Dept: FAMILY MEDICINE CLINIC | Facility: CLINIC | Age: 32
End: 2022-01-14
Payer: COMMERCIAL

## 2022-01-14 VITALS
RESPIRATION RATE: 18 BRPM | SYSTOLIC BLOOD PRESSURE: 122 MMHG | OXYGEN SATURATION: 97 % | WEIGHT: 149.6 LBS | DIASTOLIC BLOOD PRESSURE: 78 MMHG | BODY MASS INDEX: 27.53 KG/M2 | TEMPERATURE: 97.9 F | HEIGHT: 62 IN | HEART RATE: 63 BPM

## 2022-01-14 DIAGNOSIS — Z12.4 CERVICAL CANCER SCREENING: ICD-10-CM

## 2022-01-14 DIAGNOSIS — F41.0 PANIC ATTACKS: ICD-10-CM

## 2022-01-14 DIAGNOSIS — F41.9 ANXIETY: ICD-10-CM

## 2022-01-14 DIAGNOSIS — R00.2 PALPITATIONS: ICD-10-CM

## 2022-01-14 DIAGNOSIS — F43.21 SITUATIONAL DEPRESSION: ICD-10-CM

## 2022-01-14 DIAGNOSIS — S69.92XA INJURY OF LEFT THUMB, INITIAL ENCOUNTER: ICD-10-CM

## 2022-01-14 DIAGNOSIS — I48.0 PAROXYSMAL ATRIAL FIBRILLATION (HCC): ICD-10-CM

## 2022-01-14 DIAGNOSIS — Z00.00 WELL ADULT EXAM: Primary | ICD-10-CM

## 2022-01-14 PROBLEM — F51.01 PRIMARY INSOMNIA: Status: ACTIVE | Noted: 2022-01-14

## 2022-01-14 PROCEDURE — 99214 OFFICE O/P EST MOD 30 MIN: CPT | Performed by: PHYSICIAN ASSISTANT

## 2022-01-14 PROCEDURE — 1036F TOBACCO NON-USER: CPT | Performed by: PHYSICIAN ASSISTANT

## 2022-01-14 PROCEDURE — 3008F BODY MASS INDEX DOCD: CPT | Performed by: PHYSICIAN ASSISTANT

## 2022-01-14 PROCEDURE — 99395 PREV VISIT EST AGE 18-39: CPT | Performed by: PHYSICIAN ASSISTANT

## 2022-01-14 NOTE — PROGRESS NOTES
Assessment/Plan:    -she will proceed with an x-ray of her left thumb   -I did recommend physical therapy of the left thumb as long as the x-ray does not reveal any bony abnormalities   -I did give her the phone number again for screening Pap smear   -encouraged to get the COVID vaccine   -I did advise her to check with her insurance card for counseling  -she will continue with her medications as advised otherwise   -continue with medications as advised   -I did recommend adding melatonin 3 mg every night to her the regimen to help with sleep   -I did advise her that if she does not notice continued benefit with the above recommendations I would recommend she see Psychiatry   -recommend follow-up in 2 months    M*Modal software was used to dictate this note  It may contain errors with dictating incorrect words/spelling  Please contact provider directly for any questions  Diagnoses and all orders for this visit:    Well adult exam    Paroxysmal atrial fibrillation ([facility] [address] )    Situational depression    Panic attacks    Palpitations    Anxiety    Cervical cancer screening  -     Ambulatory Referral to Gynecology; Future    Injury of left thumb, initial encounter  -     XR thumb left first digit-min 2v; Future  -     Ambulatory Referral to Physical Therapy; Future          Subjective:      Patient ID: Alyssa Tabor is a 27 y o  female  Patient presents today for follow-up for anxiety/panic attacks, left thumb injury, paroxysmal atrial fibrillation  She states that she still isn't sleeping well  Initially she did notice some side effects from the possible increased dose of Effexor or the Seroquel but overall the side effects have resolved  She denies any suicidal ideations  She states that she would like to see counseling but she did not realize that we do not refer to counseling  She has not checked with her insurance        Approximately 6 months ago she states that she punched a wall with her left hand and noticed immediate thumb pain  She states that she does have persistent pain with certain movements  This is the 1st time she is being seen for this problem  The following portions of the patient's history were reviewed and updated as appropriate:   She  has a past medical history of Anxiety  She   Patient Active Problem List    Diagnosis Date Noted    Injury of left thumb 01/14/2022    Well adult exam 12/17/2021    Cervical cancer screening 12/17/2021    Paroxysmal atrial fibrillation ([address] ) 12/17/2021    Situational depression 12/17/2021    BMI 27 0-27 9,adult 05/14/2021    Anxiety 05/14/2021    Panic attacks 05/14/2021    Palpitations 05/14/2021    Chest pain 09/03/2020    PAC (premature atrial contraction) 09/03/2020     She  has no past surgical history on file  Her family history is not on file  She  reports that she has never smoked  She has never used smokeless tobacco  She reports current alcohol use  She reports previous drug use  Current Outpatient Medications   Medication Sig Dispense Refill    metoprolol succinate (TOPROL-XL) 25 mg 24 hr tablet Take 1 tablet daily 90 tablet 1    QUEtiapine (SEROquel) 50 mg tablet Take 1 tablet (50 mg total) by mouth daily at bedtime 90 tablet 1    venlafaxine (EFFEXOR-XR) 150 mg 24 hr capsule Take 1 capsule (150 mg total) by mouth daily with breakfast 90 capsule 1    ALPRAZolam (XANAX) 0 25 mg tablet Take 1 tablet (0 25 mg total) by mouth 3 (three) times a day as needed for anxiety for up to 10 days (Patient not taking: Reported on 9/9/2020) 20 tablet 0    Multiple Vitamin (multivitamin) tablet Take 1 tablet by mouth daily (Patient not taking: Reported on 11/30/2021 )       No current facility-administered medications for this visit       Current Outpatient Medications on File Prior to Visit   Medication Sig    metoprolol succinate (TOPROL-XL) 25 mg 24 hr tablet Take 1 tablet daily    QUEtiapine (SEROquel) 50 mg tablet Take 1 tablet (50 mg total) by mouth daily at bedtime    venlafaxine (EFFEXOR-XR) 150 mg 24 hr capsule Take 1 capsule (150 mg total) by mouth daily with breakfast    ALPRAZolam (XANAX) 0 25 mg tablet Take 1 tablet (0 25 mg total) by mouth 3 (three) times a day as needed for anxiety for up to 10 days (Patient not taking: Reported on 9/9/2020)    Multiple Vitamin (multivitamin) tablet Take 1 tablet by mouth daily (Patient not taking: Reported on 11/30/2021 )     No current facility-administered medications on file prior to visit  She has No Known Allergies       Review of Systems   Constitutional: Negative  Cardiovascular: Palpitations: Palpitations have improved  Musculoskeletal:        As stated in HPI   Psychiatric/Behavioral:        As stated in HPI         Objective:      /78 (BP Location: Left arm, Patient Position: Sitting, Cuff Size: Standard)   Pulse 63   Temp 97 9 °F (36 6 °C) (Tympanic)   Resp 18   Ht 5' 2" (1 575 m)   Wt 67 9 kg (149 lb 9 6 oz)   SpO2 97%   BMI 27 36 kg/m²          Physical Exam  Constitutional:       General: She is not in acute distress  Appearance: Normal appearance  She is well-developed  She is not ill-appearing, toxic-appearing or diaphoretic  HENT:      Head: Normocephalic and atraumatic  Neck:      Thyroid: No thyromegaly  Cardiovascular:      Rate and Rhythm: Normal rate and regular rhythm  Heart sounds: Normal heart sounds  No murmur heard  No friction rub  No gallop  Pulmonary:      Effort: Pulmonary effort is normal  No respiratory distress  Breath sounds: Normal breath sounds  No wheezing, rhonchi or rales  Abdominal:      General: Bowel sounds are normal       Palpations: Abdomen is soft  There is no mass  Tenderness: There is no abdominal tenderness  Musculoskeletal:         General: No deformity  Cervical back: Neck supple  Right lower leg: No edema  Left lower leg: No edema     Lymphadenopathy:      Cervical: No cervical adenopathy  Skin:     General: Skin is warm  Neurological:      General: No focal deficit present  Mental Status: She is alert  Psychiatric:         Mood and Affect: Mood normal          Behavior: Behavior normal          Thought Content:  Thought content normal          Judgment: Judgment normal

## 2022-01-14 NOTE — PROGRESS NOTES
Assessment/Plan:         Diagnoses and all orders for this visit:    Well adult exam          Subjective:      Patient ID: Emeka Grant is a 27 y o  female  Patient presents today for her annual physical   Other concerns will be addressed as a separate visit today  She last saw the dentist 2 years ago  Denies any problems at this time   She does were glasses and is overdue for an eye exam   Unsure of last exam   She does eat a healthy diet   She does exercise at least twice a week   She denies smoking, alcohol, drug use or vaping   She is overdue for her screening Pap smear      The following portions of the patient's history were reviewed and updated as appropriate:   She  has a past medical history of Anxiety  She   Patient Active Problem List    Diagnosis Date Noted    Well adult exam 12/17/2021    Cervical cancer screening 12/17/2021    Paroxysmal atrial fibrillation ([address] ) 12/17/2021    Situational depression 12/17/2021    BMI 27 0-27 9,adult 05/14/2021    Anxiety 05/14/2021    Panic attacks 05/14/2021    Palpitations 05/14/2021    Chest pain 09/03/2020    PAC (premature atrial contraction) 09/03/2020     She  has no past surgical history on file  Her family history is not on file  She  reports that she has never smoked  She has never used smokeless tobacco  She reports current alcohol use  She reports previous drug use    Current Outpatient Medications   Medication Sig Dispense Refill    metoprolol succinate (TOPROL-XL) 25 mg 24 hr tablet Take 1 tablet daily 90 tablet 1    QUEtiapine (SEROquel) 50 mg tablet Take 1 tablet (50 mg total) by mouth daily at bedtime 90 tablet 1    venlafaxine (EFFEXOR-XR) 150 mg 24 hr capsule Take 1 capsule (150 mg total) by mouth daily with breakfast 90 capsule 1    ALPRAZolam (XANAX) 0 25 mg tablet Take 1 tablet (0 25 mg total) by mouth 3 (three) times a day as needed for anxiety for up to 10 days (Patient not taking: Reported on 9/9/2020) 20 tablet 0    Multiple Vitamin (multivitamin) tablet Take 1 tablet by mouth daily (Patient not taking: Reported on 11/30/2021 )       No current facility-administered medications for this visit  Current Outpatient Medications on File Prior to Visit   Medication Sig    metoprolol succinate (TOPROL-XL) 25 mg 24 hr tablet Take 1 tablet daily    QUEtiapine (SEROquel) 50 mg tablet Take 1 tablet (50 mg total) by mouth daily at bedtime    venlafaxine (EFFEXOR-XR) 150 mg 24 hr capsule Take 1 capsule (150 mg total) by mouth daily with breakfast    ALPRAZolam (XANAX) 0 25 mg tablet Take 1 tablet (0 25 mg total) by mouth 3 (three) times a day as needed for anxiety for up to 10 days (Patient not taking: Reported on 9/9/2020)    Multiple Vitamin (multivitamin) tablet Take 1 tablet by mouth daily (Patient not taking: Reported on 11/30/2021 )     No current facility-administered medications on file prior to visit  She has No Known Allergies       Review of Systems      Objective:      /78 (BP Location: Left arm, Patient Position: Sitting, Cuff Size: Standard)   Pulse 63   Temp 97 9 °F (36 6 °C) (Tympanic)   Resp 18   Ht 5' 2" (1 575 m)   Wt 67 9 kg (149 lb 9 6 oz)   SpO2 97%   BMI 27 36 kg/m²          Physical Exam  Constitutional:       General: She is not in acute distress  Appearance: Normal appearance  She is well-developed  She is not ill-appearing, toxic-appearing or diaphoretic  HENT:      Head: Normocephalic and atraumatic  Neck:      Thyroid: No thyromegaly  Cardiovascular:      Rate and Rhythm: Normal rate and regular rhythm  Heart sounds: Normal heart sounds  No murmur heard  No friction rub  No gallop  Pulmonary:      Effort: Pulmonary effort is normal  No respiratory distress  Breath sounds: Normal breath sounds  No wheezing, rhonchi or rales  Abdominal:      General: Bowel sounds are normal       Palpations: Abdomen is soft  There is no mass  Tenderness:  There is no abdominal tenderness  Musculoskeletal:         General: No deformity  Cervical back: Neck supple  Right lower leg: No edema  Left lower leg: No edema  Lymphadenopathy:      Cervical: No cervical adenopathy  Skin:     General: Skin is warm  Neurological:      General: No focal deficit present  Mental Status: She is alert  Psychiatric:         Mood and Affect: Mood normal          Behavior: Behavior normal          Thought Content:  Thought content normal          Judgment: Judgment normal

## 2022-02-01 ENCOUNTER — TELEPHONE (OUTPATIENT)
Dept: PSYCHIATRY | Facility: CLINIC | Age: 32
End: 2022-02-01

## 2022-02-04 ENCOUNTER — TELEMEDICINE (OUTPATIENT)
Dept: FAMILY MEDICINE CLINIC | Facility: CLINIC | Age: 32
End: 2022-02-04
Payer: COMMERCIAL

## 2022-02-04 VITALS — WEIGHT: 145 LBS | BODY MASS INDEX: 26.68 KG/M2 | TEMPERATURE: 98.3 F | HEIGHT: 62 IN

## 2022-02-04 DIAGNOSIS — I48.0 PAROXYSMAL ATRIAL FIBRILLATION (HCC): ICD-10-CM

## 2022-02-04 DIAGNOSIS — J06.9 ACUTE UPPER RESPIRATORY INFECTION: Primary | ICD-10-CM

## 2022-02-04 PROCEDURE — 1036F TOBACCO NON-USER: CPT | Performed by: PHYSICIAN ASSISTANT

## 2022-02-04 PROCEDURE — 99214 OFFICE O/P EST MOD 30 MIN: CPT | Performed by: PHYSICIAN ASSISTANT

## 2022-02-04 PROCEDURE — 3008F BODY MASS INDEX DOCD: CPT | Performed by: PHYSICIAN ASSISTANT

## 2022-02-04 NOTE — PROGRESS NOTES
Virtual Regular Visit    Verification of patient location:    Patient is located in the following state in which I hold an active license [place]      Assessment/Plan:    Problem List Items Addressed This Visit        Cardiovascular and Mediastinum    Paroxysmal atrial fibrillation ([address] )      Other Visit Diagnoses     Acute upper respiratory infection    -  Primary        3 days with upper respiratory symptoms  Tested negative for COVID-19  Is not vaccinated with COVID-19    -she has been advised that she needs to continue to avoid any oral decongestants because of her paroxysmal atrial fibrillation  -she should continue on the Claritin and Flonase that she started yesterday  -continue with increase clear liquids  -rest and appetite as tolerated  -advised to call with any increasing symptoms or go to the ER if needed    M*Modal software was used to dictate this note  It may contain errors with dictating incorrect words/spelling  Please contact provider directly for any questions  Reason for visit is   Chief Complaint   Patient presents with    Cold Like Symptoms     sinus congestion, headache and fatigue  She did take a rapid test for covid and it was negative   Virtual Regular Visit        Encounter provider Arnol Hernandez PA-C    Provider located at [address] [place] [address] 264, [facility] [address] [place] [address]  [facility] [phone]      Recent Visits  No visits were found meeting these conditions  Showing recent visits within past 7 days and meeting all other requirements  Today's Visits  Date Type Provider Dept   02/04/22 Telemedicine Halle Garcia PA-C [facility]   Showing today's visits and meeting all other requirements  Future Appointments  No visits were found meeting these conditions    Showing future appointments within next 150 days and meeting all other requirements       The patient was identified by name and date of birth  Chon Delgado was informed that this is a telemedicine visit and that the visit is being conducted through [address] [facility] and patient was informed that this is a secure, HIPAA-compliant platform  She agrees to proceed     My office door was closed  No one else was in the room  She acknowledged consent and understanding of privacy and security of the video platform  The patient has agreed to participate and understands they can discontinue the visit at any time  Patient is aware this is a billable service  Shoshana Goins is a 27 y o  female virtual visit for upper respiratory symptoms that started 3 days ago   Patient presents today for evaluation of upper respiratory symptoms that started 3 days ago  She states that she was down in her 's basement to get clothing for their daughter  She found a cooked ham that was covered M old  She states shortly thereafter she started noticing nasal congestion, fatigue  She denies any known sick contacts  She states that she did check a rapid over-the-counter COVID test which was -2 days ago  She has not been vaccinated with COVID  She started Claritin and Flonase yesterday  She denies any fever, chills, loss of sense of smell or taste, nausea, vomiting, diarrhea, ear pain or throat pain  She cannot take any decongestants orally because of paroxysmal atrial fibrillation       Past Medical History:   Diagnosis Date    Anxiety        History reviewed  No pertinent surgical history      Current Outpatient Medications   Medication Sig Dispense Refill    metoprolol succinate (TOPROL-XL) 25 mg 24 hr tablet Take 1 tablet daily 90 tablet 1    Multiple Vitamin (multivitamin) tablet Take 1 tablet by mouth daily        QUEtiapine (SEROquel) 50 mg tablet Take 1 tablet (50 mg total) by mouth daily at bedtime 90 tablet 1    venlafaxine (EFFEXOR-XR) 150 mg 24 hr capsule Take 1 capsule (150 mg total) by mouth daily with breakfast 90 capsule 1    ALPRAZolam (XANAX) 0 25 mg tablet Take 1 tablet (0 25 mg total) by mouth 3 (three) times a day as needed for anxiety for up to 10 days (Patient not taking: Reported on 9/9/2020) 20 tablet 0     No current facility-administered medications for this visit  No Known Allergies    Review of Systems   Constitutional: Positive for fatigue  Negative for chills and fever  HENT: Positive for congestion, rhinorrhea and sinus pressure  Negative for ear pain and sore throat  Respiratory: Negative for cough, shortness of breath and wheezing  Video Exam    Vitals:    02/04/22 1133   Temp: 98 3 °F (36 8 °C)   Weight: 65 8 kg (145 lb)   Height: 5' 2" (1 575 m)       Physical Exam  Vitals reviewed  Constitutional:       General: She is not in acute distress  Appearance: Normal appearance  She is not ill-appearing, toxic-appearing or diaphoretic  HENT:      Head: Normocephalic and atraumatic  Pulmonary:      Effort: Pulmonary effort is normal  No respiratory distress (She is able to talk in full sentences without coughing or appearing short of breath)  Musculoskeletal:      Cervical back: Neck supple  Neurological:      General: No focal deficit present  Mental Status: She is alert  Psychiatric:         Mood and Affect: Mood normal          Behavior: Behavior normal          Thought Content: Thought content normal          Judgment: Judgment normal           I spent 8 minutes directly with the patient during this visit    VIRTUAL VISIT [address] verbally agrees to participate in [employer]  Pt is aware that [employer] could be limited without vital signs or the ability to perform a full hands-on physical exam  Cornelio Novoa understands she or the provider may request at any time to terminate the video visit and request the patient to seek care or treatment in person

## 2022-02-18 ENCOUNTER — HOSPITAL ENCOUNTER (OUTPATIENT)
Dept: RADIOLOGY | Facility: IMAGING CENTER | Age: 32
Discharge: HOME | End: 2022-02-18
Payer: COMMERCIAL

## 2022-02-18 DIAGNOSIS — S69.92XA INJURY OF LEFT THUMB, INITIAL ENCOUNTER: ICD-10-CM

## 2022-02-18 PROCEDURE — 73140 X-RAY EXAM OF FINGER(S): CPT

## 2022-05-06 ENCOUNTER — OFFICE VISIT (OUTPATIENT)
Dept: FAMILY MEDICINE CLINIC | Facility: CLINIC | Age: 32
End: 2022-05-06
Payer: COMMERCIAL

## 2022-05-06 VITALS
SYSTOLIC BLOOD PRESSURE: 118 MMHG | DIASTOLIC BLOOD PRESSURE: 72 MMHG | BODY MASS INDEX: 29.26 KG/M2 | HEIGHT: 62 IN | TEMPERATURE: 98.1 F | HEART RATE: 64 BPM | WEIGHT: 159 LBS | OXYGEN SATURATION: 98 %

## 2022-05-06 DIAGNOSIS — R00.2 PALPITATIONS: Primary | ICD-10-CM

## 2022-05-06 DIAGNOSIS — F41.9 ANXIETY: ICD-10-CM

## 2022-05-06 DIAGNOSIS — R07.9 CHEST PAIN, UNSPECIFIED TYPE: ICD-10-CM

## 2022-05-06 DIAGNOSIS — F41.0 PANIC ATTACKS: ICD-10-CM

## 2022-05-06 DIAGNOSIS — Z13.220 LIPID SCREENING: ICD-10-CM

## 2022-05-06 DIAGNOSIS — F43.21 SITUATIONAL DEPRESSION: ICD-10-CM

## 2022-05-06 DIAGNOSIS — F51.01 PRIMARY INSOMNIA: ICD-10-CM

## 2022-05-06 PROCEDURE — 99214 OFFICE O/P EST MOD 30 MIN: CPT | Performed by: PHYSICIAN ASSISTANT

## 2022-05-06 NOTE — PROGRESS NOTES
Assessment/Plan:    I did advise her that if she does have recurrent palpitations it would be recommended that she have an EKG performed at the time that she is having symptoms  I did advise her she could call to come in here, go to an urgent care center but she needs to consider the wait time because the palpitations may stop prior to getting the EKG  -24 hour Holter monitor has been ordered and will be scheduled by Paul Rodriguez   -she has been advised to proceed with fasting blood work over the next several days   -she will continue on the metoprolol 25 mg daily but I did not increase the dose because her heart rate today is 64 beats per minute   -she will continue on the Seroquel and Effexor  Unfortunately she is on a waiting list for Psychiatry  I did advise her to check with her insurance about counseling  -refer to Cardiology   -I did recommend follow-up here in 2 weeks, sooner if needed    M*Modal software was used to dictate this note  It may contain errors with dictating incorrect words/spelling  Please contact provider directly for any questions  Diagnoses and all orders for this visit:    Palpitations  -     Ambulatory Referral to Cardiology; Future  -     Holter monitor;  Future  -     CBC and differential  -     Comprehensive metabolic panel  -     Lipid panel  -     TSH, 3rd generation with Free T4 reflex    Situational depression  -     CBC and differential  -     Comprehensive metabolic panel  -     Lipid panel  -     TSH, 3rd generation with Free T4 reflex    Anxiety  -     CBC and differential  -     Comprehensive metabolic panel  -     Lipid panel  -     TSH, 3rd generation with Free T4 reflex    Primary insomnia  -     CBC and differential  -     Comprehensive metabolic panel  -     Lipid panel  -     TSH, 3rd generation with Free T4 reflex    Panic attacks  -     CBC and differential  -     Comprehensive metabolic panel  -     Lipid panel  -     TSH, 3rd generation with Free T4 reflex    Chest pain, unspecified type  -     Ambulatory Referral to Cardiology; Future  -     CBC and differential  -     Comprehensive metabolic panel  -     Lipid panel  -     TSH, 3rd generation with Free T4 reflex    Lipid screening  -     CBC and differential  -     Comprehensive metabolic panel  -     Lipid panel  -     TSH, 3rd generation with Free T4 reflex          Subjective:      Patient ID: Jillian Flores is a 34 y o  female  Patient presents today for a visit for recurrent palpitations she has been having almost daily over the past month  She states she notices them more so upon awakening in the morning  She states that she has not been sleeping well  She does have her good and bad days with regards to her stress level  Denies any suicidal ideations  She states that she does not feel anxious at the time that she is having the palpitations  She is currently on a waiting list for Psychiatry  She has not tried to check with her insurance about counseling  She does have a history of paroxysmal atrial fibrillation  She has not seen a cardiologist in 2 years  She does not check her heart rate at the time that she is having the palpitations  She states this morning she was having some chest pressure which has now resolved  She denies any shortness of breath or swelling of her lower extremities  She has not had blood work done in several years  She denies any weight loss      The following portions of the patient's history were reviewed and updated as appropriate:   She  has a past medical history of Anxiety    She   Patient Active Problem List    Diagnosis Date Noted    Lipid screening 05/06/2022    Injury of left thumb 01/14/2022    Primary insomnia 01/14/2022    Well adult exam 12/17/2021    Cervical cancer screening 12/17/2021    Parox[facility] [address] ) 12/17/2021    Situational depression 12/17/2021    BMI 27 0-27 9,adult 05/14/2021    Anxiety 05/14/2021    Panic attacks 05/14/2021  Palpitations 05/14/2021    Chest pain 09/03/2020    PAC (premature atrial contraction) 09/03/2020     She  has no past surgical history on file  Her family history is not on file  She  reports that she has never smoked  She has never used smokeless tobacco  She reports current alcohol use  She reports previous drug use  Current Outpatient Medications   Medication Sig Dispense Refill    metoprolol succinate (TOPROL-XL) 25 mg 24 hr tablet Take 1 tablet daily 90 tablet 1    Multiple Vitamin (multivitamin) tablet Take 1 tablet by mouth daily        QUEtiapine (SEROquel) 50 mg tablet Take 1 tablet (50 mg total) by mouth daily at bedtime 90 tablet 1    venlafaxine (EFFEXOR-XR) 150 mg 24 hr capsule Take 1 capsule (150 mg total) by mouth daily with breakfast 90 capsule 1    ALPRAZolam (XANAX) 0 25 mg tablet Take 1 tablet (0 25 mg total) by mouth 3 (three) times a day as needed for anxiety for up to 10 days (Patient not taking: Reported on 9/9/2020) 20 tablet 0     No current facility-administered medications for this visit  Current Outpatient Medications on File Prior to Visit   Medication Sig    metoprolol succinate (TOPROL-XL) 25 mg 24 hr tablet Take 1 tablet daily    Multiple Vitamin (multivitamin) tablet Take 1 tablet by mouth daily      QUEtiapine (SEROquel) 50 mg tablet Take 1 tablet (50 mg total) by mouth daily at bedtime    venlafaxine (EFFEXOR-XR) 150 mg 24 hr capsule Take 1 capsule (150 mg total) by mouth daily with breakfast    ALPRAZolam (XANAX) 0 25 mg tablet Take 1 tablet (0 25 mg total) by mouth 3 (three) times a day as needed for anxiety for up to 10 days (Patient not taking: Reported on 9/9/2020)     No current facility-administered medications on file prior to visit  She has No Known Allergies       Review of Systems   Constitutional: Positive for fatigue  Negative for unexpected weight change  Respiratory: Negative for cough, shortness of breath and wheezing      Cardiovascular: As stated in HPI   Gastrointestinal: Negative for abdominal pain, nausea and vomiting  Denies any reflux symptom   Psychiatric/Behavioral:        As stated in HPI         Objective:      /72 (BP Location: Left arm, Patient Position: Sitting, Cuff Size: Standard)   Pulse 64   Temp 98 1 °F (36 7 °C) (Tympanic)   Ht 5' 2" (1 575 m)   Wt 72 1 kg (159 lb)   SpO2 98%   BMI 29 08 kg/m²          Physical Exam  Vitals reviewed  Constitutional:       General: She is not in acute distress  Appearance: Normal appearance  She is well-developed  She is not ill-appearing, toxic-appearing or diaphoretic  HENT:      Head: Normocephalic and atraumatic  Neck:      Thyroid: No thyromegaly  Comments: No palpable thyroid masses or enlargement  Cardiovascular:      Rate and Rhythm: Normal rate and regular rhythm  Heart sounds: Normal heart sounds  No murmur heard  No friction rub  No gallop  Pulmonary:      Effort: Pulmonary effort is normal  No respiratory distress  Breath sounds: Normal breath sounds  No wheezing, rhonchi or rales  Abdominal:      General: Bowel sounds are normal       Palpations: Abdomen is soft  There is no mass  Tenderness: There is no abdominal tenderness  Musculoskeletal:         General: No deformity  Cervical back: Neck supple  Right lower leg: No edema  Left lower leg: No edema  Lymphadenopathy:      Cervical: No cervical adenopathy  Skin:     General: Skin is warm  Neurological:      General: No focal deficit present  Mental Status: She is alert  Psychiatric:         Mood and Affect: Mood normal          Behavior: Behavior normal          Thought Content:  Thought content normal          Judgment: Judgment normal

## 2022-05-12 ENCOUNTER — APPOINTMENT (EMERGENCY)
Dept: RADIOLOGY | Facility: HOSPITAL | Age: 32
End: 2022-05-12
Payer: COMMERCIAL

## 2022-05-12 ENCOUNTER — RA CDI HCC (OUTPATIENT)
Dept: OTHER | Facility: HOSPITAL | Age: 32
End: 2022-05-12

## 2022-05-12 ENCOUNTER — HOSPITAL ENCOUNTER (EMERGENCY)
Facility: HOSPITAL | Age: 32
Discharge: HOME | End: 2022-05-12
Attending: EMERGENCY MEDICINE | Admitting: EMERGENCY MEDICINE
Payer: COMMERCIAL

## 2022-05-12 VITALS
HEART RATE: 76 BPM | SYSTOLIC BLOOD PRESSURE: 119 MMHG | BODY MASS INDEX: 28.17 KG/M2 | OXYGEN SATURATION: 97 % | TEMPERATURE: 98.8 F | WEIGHT: 154 LBS | RESPIRATION RATE: 16 BRPM | DIASTOLIC BLOOD PRESSURE: 65 MMHG

## 2022-05-12 DIAGNOSIS — R00.2 PALPITATIONS: ICD-10-CM

## 2022-05-12 DIAGNOSIS — R25.1 TREMOR: Primary | ICD-10-CM

## 2022-05-12 DIAGNOSIS — R07.89 CHEST PRESSURE: ICD-10-CM

## 2022-05-12 LAB
ALBUMIN SERPL BCP-MCNC: 4.7 G/DL (ref 3.5–5)
ALP SERPL-CCNC: 54 U/L (ref 34–104)
ALT SERPL W P-5'-P-CCNC: 15 U/L (ref 7–52)
ANION GAP SERPL CALCULATED.3IONS-SCNC: 7 MMOL/L (ref 4–13)
AST SERPL W P-5'-P-CCNC: 18 U/L (ref 13–39)
ATRIAL RATE: 94 BPM
BILIRUB SERPL-MCNC: 0.73 MG/DL (ref 0.2–1)
BILIRUB UR QL STRIP: NEGATIVE
BUN SERPL-MCNC: 12 MG/DL (ref 5–25)
CALCIUM SERPL-MCNC: 9.5 MG/DL (ref 8.4–10.2)
CARDIAC TROPONIN I PNL SERPL HS: <2 NG/L
CHLORIDE SERPL-SCNC: 102 MMOL/L (ref 96–108)
CLARITY UR: CLEAR
CO2 SERPL-SCNC: 30 MMOL/L (ref 21–32)
COLOR UR: YELLOW
CREAT SERPL-MCNC: 0.74 MG/DL (ref 0.6–1.3)
ERYTHROCYTE [DISTWIDTH] IN BLOOD BY AUTOMATED COUNT: 12.7 % (ref 11.6–15.1)
EXT PREG TEST URINE: NEGATIVE
EXT. CONTROL ED NAV: NORMAL
GFR SERPL CREATININE-BSD FRML MDRD: 106 ML/MIN/1.73SQ M
GLUCOSE SERPL-MCNC: 108 MG/DL (ref 65–140)
GLUCOSE UR STRIP-MCNC: NEGATIVE MG/DL
HCT VFR BLD AUTO: 40.1 % (ref 34.8–46.1)
HGB BLD-MCNC: 13.3 G/DL (ref 11.5–15.4)
HGB UR QL STRIP.AUTO: NEGATIVE
KETONES UR STRIP-MCNC: ABNORMAL MG/DL
LEUKOCYTE ESTERASE UR QL STRIP: NEGATIVE
MCH RBC QN AUTO: 33.3 PG (ref 26.8–34.3)
MCHC RBC AUTO-ENTMCNC: 33.2 G/DL (ref 31.4–37.4)
MCV RBC AUTO: 100 FL (ref 82–98)
NITRITE UR QL STRIP: NEGATIVE
P AXIS: 71 DEGREES
PH UR STRIP.AUTO: 6.5 [PH]
PLATELET # BLD AUTO: 386 THOUSANDS/UL (ref 149–390)
PMV BLD AUTO: 9 FL (ref 8.9–12.7)
POTASSIUM SERPL-SCNC: 3.5 MMOL/L (ref 3.5–5.3)
PR INTERVAL: 136 MS
PROT SERPL-MCNC: 7.8 G/DL (ref 6.4–8.4)
PROT UR STRIP-MCNC: NEGATIVE MG/DL
QRS AXIS: 72 DEGREES
QRSD INTERVAL: 84 MS
QT INTERVAL: 354 MS
QTC INTERVAL: 436 MS
RBC # BLD AUTO: 4 MILLION/UL (ref 3.81–5.12)
SODIUM SERPL-SCNC: 139 MMOL/L (ref 135–147)
SP GR UR STRIP.AUTO: 1.02 (ref 1–1.03)
T WAVE AXIS: 48 DEGREES
T4 FREE SERPL-MCNC: 0.76 NG/DL (ref 0.76–1.46)
TSH SERPL DL<=0.05 MIU/L-ACNC: 1.35 UIU/ML (ref 0.45–4.5)
UROBILINOGEN UR QL STRIP.AUTO: 1 E.U./DL
VENTRICULAR RATE: 91 BPM
WBC # BLD AUTO: 6.22 THOUSAND/UL (ref 4.31–10.16)

## 2022-05-12 PROCEDURE — 36415 COLL VENOUS BLD VENIPUNCTURE: CPT

## 2022-05-12 PROCEDURE — 84484 ASSAY OF TROPONIN QUANT: CPT

## 2022-05-12 PROCEDURE — 84443 ASSAY THYROID STIM HORMONE: CPT

## 2022-05-12 PROCEDURE — 85027 COMPLETE CBC AUTOMATED: CPT

## 2022-05-12 PROCEDURE — 93010 ELECTROCARDIOGRAM REPORT: CPT | Performed by: INTERNAL MEDICINE

## 2022-05-12 PROCEDURE — 99284 EMERGENCY DEPT VISIT MOD MDM: CPT

## 2022-05-12 PROCEDURE — 93005 ELECTROCARDIOGRAM TRACING: CPT

## 2022-05-12 PROCEDURE — 71045 X-RAY EXAM CHEST 1 VIEW: CPT

## 2022-05-12 PROCEDURE — 84439 ASSAY OF FREE THYROXINE: CPT

## 2022-05-12 PROCEDURE — 80053 COMPREHEN METABOLIC PANEL: CPT

## 2022-05-12 PROCEDURE — 81003 URINALYSIS AUTO W/O SCOPE: CPT

## 2022-05-12 PROCEDURE — 99284 EMERGENCY DEPT VISIT MOD MDM: CPT | Performed by: EMERGENCY MEDICINE

## 2022-05-12 PROCEDURE — 81025 URINE PREGNANCY TEST: CPT

## 2022-05-12 NOTE — PROGRESS NOTES
[facility]  coding opportunities       Chart reviewed, no opportunity found: CHART REVIEWED, NO OPPORTUNITY FOUND        Patients Insurance        Commercial Insurance: [address] [place] [address]

## 2022-05-12 NOTE — ED PROVIDER NOTES
History  Chief Complaint   Patient presents with    Shaking     Pt worried about thyroid issues mom has graves  Pt also reports hands are shaky since yesterday, pt also reports heart rate has been slow at times and fast at times  Pt denies feeling anxious     HPI   34 yof with PMHx of panic attacks, primary insomnia, depression presents to [facility] ED with 2 weeks history of rapid heart rate and chest pressure, tremor  She reports that the tremor and increased heart rate has gotten worse for the past two days  She was seen by her PCP on Friday and ordered a 24 hour Holter monitor, cardiology f/u and recommended to go to urgent care for an EKG if symptoms worsen  Patient presented today with worsening tachycardia, tremor and feeling pressure on her chest  Patient also noticed that her HR was very slow this morning around 50 BMP, then her HR increased to 160  She denied any shortness of breath during this time, however, had some funny sensation on her chest as well as palpitations  Patient also has a hx of paroxysmal a fib that was diagnosed last year, she is currently taking Toprol-XL  Patient reports hx of ischemic stroke on her mothers side and that most women on the mothers side have cardiac issues  Prior to Admission Medications   Prescriptions Last Dose Informant Patient Reported? Taking?    ALPRAZolam (XANAX) 0 25 mg tablet  Self No No   Sig: Take 1 tablet (0 25 mg total) by mouth 3 (three) times a day as needed for anxiety for up to 10 days   Patient not taking: Reported on 9/9/2020   Multiple Vitamin (multivitamin) tablet  Self Yes No   Sig: Take 1 tablet by mouth daily     QUEtiapine (SEROquel) 50 mg tablet  Self No No   Sig: Take 1 tablet (50 mg total) by mouth daily at bedtime   metoprolol succinate (TOPROL-XL) 25 mg 24 hr tablet  Self No No   Sig: Take 1 tablet daily   venlafaxine (EFFEXOR-XR) 150 mg 24 hr capsule  Self No No   Sig: Take 1 capsule (150 mg total) by mouth daily with breakfast Facility-Administered Medications: None       Past Medical History:   Diagnosis Date    Anxiety        No past surgical history on file  No family history on file  I have reviewed and agree with the history as documented  E-Cigarette/Vaping    E-Cigarette Use Never User      E-Cigarette/Vaping Substances    Nicotine No     THC No     CBD No     Flavoring No     Other No     Unknown No      Social History     Tobacco Use    Smoking status: Never Smoker    Smokeless tobacco: Never Used   Vaping Use    Vaping Use: Never used   Substance Use Topics    Alcohol use: Yes    Drug use: Not Currently        Review of Systems   Constitutional: Negative for activity change and fever  HENT: Negative for congestion  Respiratory: Negative for cough, chest tightness, shortness of breath and wheezing  Cardiovascular: Positive for palpitations  Negative for chest pain and leg swelling  Chest pressure  Gastrointestinal: Positive for nausea  Negative for abdominal distention, abdominal pain, constipation, diarrhea and vomiting  Endocrine: Positive for cold intolerance  Genitourinary: Negative for difficulty urinating, dysuria, frequency, hematuria and urgency  Musculoskeletal: Negative for arthralgias, back pain and myalgias  Skin: Negative for rash  Neurological: Negative for dizziness, weakness, numbness and headaches  Hand tremor  Psychiatric/Behavioral:        Denies being anxious   All other systems reviewed and are negative        Physical Exam  ED Triage Vitals   Temperature Pulse Respirations Blood Pressure SpO2   05/12/22 1208 05/12/22 1208 05/12/22 1208 05/12/22 1208 05/12/22 1208   98 8 °F (37 1 °C) 95 18 144/76 97 %      Temp Source Heart Rate Source Patient Position - Orthostatic VS BP Location FiO2 (%)   05/12/22 1208 05/12/22 1208 05/12/22 1635 05/12/22 1208 --   Oral Monitor Lying Left arm       Pain Score       --                    Orthostatic Vital Signs  Vitals:    05/12/22 1208 05/12/22 1635   BP: 144/76 119/65   Pulse: 95 76   Patient Position - Orthostatic VS:  Lying       Physical Exam  Vitals and nursing note reviewed  Constitutional:       General: She is not in acute distress  Appearance: Normal appearance  She is well-developed  She is not diaphoretic  Interventions: She is not intubated  HENT:      Head: Normocephalic and atraumatic  Right Ear: Tympanic membrane normal       Left Ear: Tympanic membrane normal       Nose: Nose normal       Mouth/Throat:      Mouth: Mucous membranes are moist    Eyes:      General: No scleral icterus  Conjunctiva/sclera: Conjunctivae normal       Pupils: Pupils are equal, round, and reactive to light  Neck:      Trachea: No tracheal deviation  Cardiovascular:      Rate and Rhythm: Regular rhythm  Tachycardia present  Pulses: Normal pulses  Radial pulses are 2+ on the right side and 2+ on the left side  Dorsalis pedis pulses are 2+ on the right side and 2+ on the left side  Heart sounds: Normal heart sounds, S1 normal and S2 normal  No murmur heard  No friction rub  No gallop  Pulmonary:      Effort: Pulmonary effort is normal  No respiratory distress  She is not intubated  Breath sounds: Normal breath sounds  No stridor  No decreased breath sounds, wheezing or rales  Chest:      Chest wall: No tenderness  Abdominal:      General: Bowel sounds are normal  There is no distension  Palpations: Abdomen is soft  There is no mass  Tenderness: There is no abdominal tenderness  Musculoskeletal:         General: No tenderness or deformity  Cervical back: Normal range of motion  Comments: Mild hand tremor on outstretched hands  Lymphadenopathy:      Cervical: No cervical adenopathy  Skin:     General: Skin is warm and dry  Capillary Refill: Capillary refill takes less than 2 seconds  Coloration: Skin is not pale        Findings: No erythema  Neurological:      General: No focal deficit present  Mental Status: She is alert and oriented to person, place, and time  Cranial Nerves: No cranial nerve deficit  Sensory: No sensory deficit  Motor: No weakness  Coordination: Coordination normal       Gait: Gait normal       Comments: Reflexes LE 3+   Psychiatric:         Speech: Speech normal          Behavior: Behavior normal          Thought Content: Thought content normal          Judgment: Judgment normal          ED Medications  Medications - No data to display    Diagnostic Studies  Results Reviewed     Procedure Component Value Units Date/Time    HS Troponin I 4hr [[id number]]     Lab Status: No result Specimen: Blood     HS Troponin 0hr (reflex protocol) [[id number]]  (Normal) Collected: 05/12/22 1451    Lab Status: Final result Specimen: Blood from Arm, Right Updated: 05/12/22 1535     hs TnI 0hr <2 ng/L     HS Troponin I 2hr [[id number]]     Lab Status: No result Specimen: Blood     TSH [[id number]]  (Normal) Collected: 05/12/22 1313    Lab Status: Final result Specimen: Blood from Arm, Right Updated: 05/12/22 1446     TSH 3RD GENERATON 1 353 uIU/mL     Narrative:      Patients undergoing fluorescein dye angiography may retain small amounts of fluorescein in the body for 48-72 hours post procedure  Samples containing fluorescein can produce falsely depressed TSH values  If the patient had this procedure,a specimen should be resubmitted post fluorescein clearance        Comprehensive metabolic panel [[id number]] Collected: 05/12/22 1313    Lab Status: Final result Specimen: Blood from Arm, Right Updated: 05/12/22 1416     Sodium 139 mmol/L      Potassium 3 5 mmol/L      Chloride 102 mmol/L      CO2 30 mmol/L      ANION GAP 7 mmol/L      BUN 12 mg/dL      Creatinine 0 74 mg/dL      Glucose 108 mg/dL      Calcium 9 5 mg/dL      AST 18 U/L      ALT 15 U/L      Alkaline Phosphatase 54 U/L      Total Protein 7 8 g/dL Albumin 4 7 g/dL      Total Bilirubin 0 73 mg/dL      eGFR 106 ml/min/1 73sq m     Narrative:      [place] guidelines for Chronic Kidney Disease (CKD):     Stage 1 with normal or high GFR (GFR > 90 mL/min/1 73 square meters)    Stage 2 Mild CKD (GFR = 60-89 mL/min/1 73 square meters)    Stage 3A Moderate CKD (GFR = 45-59 mL/min/1 73 square meters)    Stage 3B Moderate CKD (GFR = 30-44 mL/min/1 73 square meters)    Stage 4 Severe CKD (GFR = 15-29 mL/min/1 73 square meters)    Stage 5 End Stage CKD (GFR <15 mL/min/1 73 square meters)  Note: GFR calculation is accurate only with a steady state creatinine    UA (URINE) with reflex to Scope [[id number]]  (Abnormal) Collected: 05/12/22 1324    Lab Status: Final result Specimen: Urine, Clean Catch Updated: 05/12/22 1353     Color, UA Yellow     Clarity, UA Clear     Specific Gravity, UA 1 025     pH, UA 6 5     Leukocytes, UA Negative     Nitrite, UA Negative     Protein, UA Negative mg/dl      Glucose, UA Negative mg/dl      Ketones, UA Trace mg/dl      Urobilinogen, UA 1 0 E U /dl      Bilirubin, UA Negative     Blood, UA Negative    POCT pregnancy, urine [[id number]]  (Normal) Resulted: 05/12/22 1329    Lab Status: Final result Updated: 05/12/22 1329     EXT PREG TEST UR (Ref: Negative) negative     Control valid    CBC [[id number]]  (Abnormal) Collected: 05/12/22 1313    Lab Status: Final result Specimen: Blood from Arm, Right Updated: 05/12/22 1326     WBC 6 22 Thousand/uL      RBC 4 00 Million/uL      Hemoglobin 13 3 g/dL      Hematocrit 40 1 %       fL      MCH 33 3 pg      MCHC 33 2 g/dL      RDW 12 7 %      Platelets 474 Thousands/uL      MPV 9 0 fL     T4, free [[id number]] Collected: 05/12/22 1313    Lab Status: In process Specimen: Blood from Arm, Right Updated: 05/12/22 1320                 XR chest portable   Final Result by Justin Villa MD (05/12 1652)      No acute cardiopulmonary disease  Workstation performed: [id number]               Procedures  Procedures      ED Course  ED Course as of 05/12/22 1740   Thu May 12, 2022   1535 HS Troponin I 2hr                                       MDM  Number of Diagnoses or Management Options  Chest pressure  Palpitations  Tremor  Diagnosis management comments: 34 yof with pmhx of anxiety, situational depression, hx of palpitations presents with worsening bilateral upper extremity tremor, palpitations and chest pressure  She has episodes of tachycardia followed by relative slow heart rate as low as in 50s  Patient was seen by her PCP on Friday who ordered a Holter Monitor and a Cardiology follow up for palpitations  CXR, CBC, BMP were unremarkable, troponin was negative  Initial EKG with sinus tachycardia HR in 110s, which improved subsequently into 70s, repeat EKG showed NSR with no signs of ischemia or ST changes  TSH was within normal limits  UA was unremarkable and POCT pregnancy test was negative  Symptoms are likely related to anxiety and pain, unlikely related to hyperthyroidism and thyroiditis or pheochromocytoma  Patient was discharged home with follow up       Disposition  Final diagnoses:   Tremor   Palpitations   Chest pressure     Time reflects when diagnosis was documented in both MDM as applicable and the Disposition within this note     Time User Action Codes Description Comment    5/12/2022  4:01 PM Rosalind Ditty Add [R25 1] Tremor     5/12/2022  4:02 PM Rosalind Ditty Add [R00 2] Palpitations     5/12/2022  4:02 PM Rosalind Ditty Add [R07 89] Chest pressure     5/12/2022  4:02 PM Rosalind Ditty Add [R53 83] Lethargy     5/12/2022  4:02 PM Carmelia Sadie [R53 83] Lethargy       ED Disposition     ED Disposition   Discharge    Condition   Stable    Date/Time   Thu May 12, 2022  4:01 PM    Nahid U  94  discharge to home/self care                 Follow-up Information     Follow up With Specialties Details Why Contact Info Additional Information    Halle Garcia PA-C Family Medicine, Physician Assistant Schedule an appointment as soon as possible for a visit in 3 days For follow-up [facility] [phone]       [facility] Emergency Department Emergency Medicine Go to  As needed [address] [facility] [address] [facility], [address], [place], [place], [address] [facility] Neurology [facility] Neurology Schedule an appointment as soon as possible for a visit in 3 days For follow-up [address] [facility] [place] Neurology [facility], [address], [place], [place], [address] [facility]    [address] [facility] Cardiology Schedule an appointment as soon as possible for a visit in 3 days For follow-up [address] [facility] [id number]  [address] [place] [address] Cardiology [facility], [address] [facility], [place], [place], [address]          Discharge Medication List as of 5/12/2022  4:33 PM      CONTINUE these medications which have NOT CHANGED    Details   ALPRAZolam (XANAX) 0 25 mg tablet Take 1 tablet (0 25 mg total) by mouth 3 (three) times a day as needed for anxiety for up to 10 days, Starting Fri 9/4/2020, Until Mon 9/14/2020, Normal      metoprolol succinate (TOPROL-XL) 25 mg 24 hr tablet Take 1 tablet daily, Normal      Multiple Vitamin (multivitamin) tablet Take 1 tablet by mouth daily  , Historical Med      QUEtiapine (SEROquel) 50 mg tablet Take 1 tablet (50 mg total) by mouth daily at bedtime, Starting Fri 12/17/2021, Normal      venlafaxine (EFFEXOR-XR) 150 mg 24 hr capsule Take 1 capsule (150 mg total) by mouth daily with breakfast, Starting Fri 12/17/2021, Normal           No discharge procedures on file      PDMP Review     None           ED Provider  Attending physically available and evaluated Dinesh Jenkins I managed the patient along with the ED Attending      Electronically Signed by         Husam Romano MD  05/12/22 3699

## 2022-05-12 NOTE — DISCHARGE INSTRUCTIONS
Please follow up with your primary care provider for further evaluation  Please follow up with recommendations of Holter monitor evaluation and Cardiology  Please return to ED or go to the nearest urgent care if you develop any worsening chest pain, shortness of breath and worsening lightheadedness that may or may not be associated with nausea or vomiting, any dizziness and/or  lightheadedness

## 2022-05-13 LAB
ATRIAL RATE: 80 BPM
P AXIS: 62 DEGREES
PR INTERVAL: 150 MS
QRS AXIS: 71 DEGREES
QRSD INTERVAL: 88 MS
QT INTERVAL: 370 MS
QTC INTERVAL: 414 MS
T WAVE AXIS: 63 DEGREES
VENTRICULAR RATE: 75 BPM

## 2022-05-13 PROCEDURE — 93010 ELECTROCARDIOGRAM REPORT: CPT | Performed by: INTERNAL MEDICINE

## 2022-05-13 NOTE — PROGRESS NOTES
Ariana Webster is a 34 y o  female who presents for annual well woman exam   Last Pap smear ***  Periods are {gyn period regularity:715}, lasting {numbers; 0-10:18340} days  No intermenstrual bleeding, spotting, or discharge  Current contraception: {contraceptive method:5051}  History of abnormal Pap smear: no  Family history of uterine or ovarian cancer: no  Regular self breast exam: no  History of abnormal mammogram: to begin at age 35 unless otherwise clinically indicated   Family history of breast cancer: no  History of abnormal lipids: no  Gardasil vaccine:***      Menstrual History:  OB History    No obstetric history on file  Menarche age: ***  Patient's last menstrual period was 05/04/2022  The following portions of the patient's history were reviewed and updated as appropriate: allergies, current medications, past family history, past medical history, past social history, past surgical history and problem list     Review of Systems  Pertinent items are noted in HPI  Objective      LMP 05/04/2022     General:   alert and oriented, in no acute distress, alert, appears stated age and cooperative   Heart: regular rate and rhythm, S1, S2 normal, no murmur, click, rub or gallop   Lungs: clear to auscultation bilaterally   Abdomen: soft, non-tender, without masses or organomegaly   Vulva: normal, Bartholin's, Urethra, Nutrioso's normal   Vagina: normal mucosa, normal discharge, no palpable nodules   Cervix: no bleeding following Pap, no cervical motion tenderness and no lesions   Uterus: normal size, non-tender, normal shape and consistency   Adnexa: normal adnexa and no mass, fullness, tenderness   Breast: breasts appear normal, no suspicious masses, no skin or nipple changes or axillary nodes  Assessment      @{Gyn assessment:5268::"well woman"}@   Plan      {gyn plan:84126}   Reviewed all forms of contraception including LARCs    Patient is most interested in ***   Encouraged healthy diet, exercise and lifestyle  Encouraged follow-up with PCP as needed  Gardasil vaccine series reviewed  Written information provided  Encouraged social distancing, good hand hygiene, avoidance of crowds and masking    Written information provided about COVID-19    Will call with results  VBI-

## 2022-05-16 ENCOUNTER — OFFICE VISIT (OUTPATIENT)
Dept: OBGYN CLINIC | Facility: MEDICAL CENTER | Age: 32
End: 2022-05-16
Payer: COMMERCIAL

## 2022-05-16 VITALS
DIASTOLIC BLOOD PRESSURE: 60 MMHG | SYSTOLIC BLOOD PRESSURE: 106 MMHG | WEIGHT: 156 LBS | HEIGHT: 62 IN | BODY MASS INDEX: 28.71 KG/M2

## 2022-05-16 DIAGNOSIS — Z01.419 WELL WOMAN EXAM WITH ROUTINE GYNECOLOGICAL EXAM: Primary | ICD-10-CM

## 2022-05-16 DIAGNOSIS — Z30.8 ENCOUNTER FOR OTHER CONTRACEPTIVE MANAGEMENT: ICD-10-CM

## 2022-05-16 PROCEDURE — 99202 OFFICE O/P NEW SF 15 MIN: CPT | Performed by: NURSE PRACTITIONER

## 2022-05-16 NOTE — PROGRESS NOTES
Assessment/Plan:      Diagnoses and all orders for this visit:    Well woman exam with routine gynecological exam    Encounter for other contraceptive management      -reviewed all forms of contraception including LARCs  Patient is most interested in C/ [place] 9  -reviewed with patient Nexplanon is effective for 3 years  Common side effect is irregular/unpredictable vaginal bleeding  Insertion and removal procedures reviewed  Insurance information provided  -reviewed recommendations for Gardasil vaccine series  Written information provided  -reviewed and encouraged safe sexual practices including consistent condom use    RTO nexplanon placement     Subjective:     Patient ID: Ramila Meier is a 34 y o  female  HPI   here initially for annual exam declined annual exam today would like contraception consult  Menarche at age 14 menses are monthly lasting 2-3 days  bleeding is described as very light  Is currently sexually active using condoms for contraception  Last IC 3 nights ago  Medical history significant for anxiety, depression, endometriosis and PACs  Patient is a nonsmoker      Last Pap smear-about 5 years ago  Gardasil vaccine-no    Review of Systems   Constitutional: Negative for chills, fatigue and fever  Respiratory: Negative  Cardiovascular: Negative  Genitourinary: Negative  Objective:  /60   Ht 5' 2" (1 575 m)   Wt 70 8 kg (156 lb)   LMP 2022   BMI 28 53 kg/m² '   Physical Exam  Vitals reviewed  Constitutional:       Appearance: Normal appearance  Cardiovascular:      Rate and Rhythm: Normal rate and regular rhythm  Heart sounds: Normal heart sounds  Pulmonary:      Effort: Pulmonary effort is normal       Breath sounds: Normal breath sounds  Neurological:      Mental Status: She is alert and oriented to person, place, and time     Psychiatric:         Mood and Affect: Mood normal          Behavior: Behavior normal

## 2022-05-18 ENCOUNTER — PROCEDURE VISIT (OUTPATIENT)
Dept: OBGYN CLINIC | Facility: MEDICAL CENTER | Age: 32
End: 2022-05-18
Payer: COMMERCIAL

## 2022-05-18 VITALS
HEIGHT: 62 IN | WEIGHT: 154 LBS | SYSTOLIC BLOOD PRESSURE: 106 MMHG | BODY MASS INDEX: 28.34 KG/M2 | DIASTOLIC BLOOD PRESSURE: 60 MMHG

## 2022-05-18 DIAGNOSIS — Z30.017 NEXPLANON INSERTION: Primary | ICD-10-CM

## 2022-05-18 LAB — SL AMB POCT URINE HCG: NEGATIVE

## 2022-05-18 PROCEDURE — 81025 URINE PREGNANCY TEST: CPT | Performed by: NURSE PRACTITIONER

## 2022-05-18 PROCEDURE — 11981 INSERTION DRUG DLVR IMPLANT: CPT | Performed by: NURSE PRACTITIONER

## 2022-05-18 NOTE — PROGRESS NOTES
Universal Protocol:  Procedure performed by:  Consent: Verbal consent not obtained  Written consent not obtained  Risks and benefits: risks, benefits and alternatives were discussed  Consent given by: patient  Time out: Immediately prior to procedure a "time out" was called to verify the correct patient, procedure, equipment, support staff and site/side marked as required  Timeout called at: 5/18/2022 3:16 PM   Patient understanding: patient states understanding of the procedure being performed  Patient consent: the patient's understanding of the procedure matches consent given  Procedure consent: procedure consent matches procedure scheduled  Relevant documents: relevant documents present and verified  Test results: test results available and properly labeled  Site marked: the operative site was marked  Radiology Images displayed and confirmed  If images not available, report reviewed: imaging studies not available  Required items: required blood products, implants, devices, and special equipment available  Patient identity confirmed: verbally with patient    Remove and insert drug implant    Date/Time: 5/18/2022 3:32 PM  Performed by: KEIRA Pitts  Authorized by: KEIRA Pitts     Indication:     Indication: Insertion of non-biodegradable drug delivery implant    Pre-procedure:     Pre-procedure timeout performed: yes      Anesthesia premedication: none  Prepped with: alcohol 70% and povidone-iodine      Local anesthetic:  Lidocaine with epinephrine    The site was cleaned and prepped in a sterile fashion: yes    Procedure:     Procedure:   Insertion    Small stab incision was made in arm: no      Left/right:  Right (LOT [id number])    Preloaded contraceptive capsule trocar was placed subdermally: yes      Visualization of implant was obtained: yes      Contraceptive capsule was inserted and trocar removed: yes      Visualization of notch in stylet and palpation of device: yes      Palpation confirms placement by provider and patient: yes      Site was closed with steri-strips and pressure bandage applied: yes    Comments:       here for Nexplanon insertion  Risks, benefits and alternatives reviewed  Reviewed patient Nexplanon is effective for 3 years  Common side effects including irregular/unpredictable vaginal bleeding reviewed  Consent reviewed and signed  Patient desires placement today  UPT negative in office today  Nexplanon placed without difficulty, patient tolerated well  Signs and symptoms report reviewed      RTO annual exam

## 2022-05-20 ENCOUNTER — OFFICE VISIT (OUTPATIENT)
Dept: FAMILY MEDICINE CLINIC | Facility: CLINIC | Age: 32
End: 2022-05-20
Payer: COMMERCIAL

## 2022-05-20 VITALS
SYSTOLIC BLOOD PRESSURE: 112 MMHG | HEART RATE: 70 BPM | RESPIRATION RATE: 18 BRPM | HEIGHT: 62 IN | OXYGEN SATURATION: 97 % | DIASTOLIC BLOOD PRESSURE: 68 MMHG | WEIGHT: 158 LBS | BODY MASS INDEX: 29.08 KG/M2

## 2022-05-20 DIAGNOSIS — R00.2 PALPITATIONS: Primary | ICD-10-CM

## 2022-05-20 DIAGNOSIS — F51.01 PRIMARY INSOMNIA: ICD-10-CM

## 2022-05-20 DIAGNOSIS — R60.0 BILATERAL LEG EDEMA: ICD-10-CM

## 2022-05-20 DIAGNOSIS — F41.0 PANIC ATTACKS: ICD-10-CM

## 2022-05-20 DIAGNOSIS — F41.9 ANXIETY: ICD-10-CM

## 2022-05-20 PROBLEM — I83.893 VARICOSE VEINS OF BOTH LEGS WITH EDEMA: Status: ACTIVE | Noted: 2022-05-20

## 2022-05-20 PROCEDURE — 99214 OFFICE O/P EST MOD 30 MIN: CPT | Performed by: PHYSICIAN ASSISTANT

## 2022-05-20 PROCEDURE — 1036F TOBACCO NON-USER: CPT | Performed by: PHYSICIAN ASSISTANT

## 2022-05-20 PROCEDURE — 3008F BODY MASS INDEX DOCD: CPT | Performed by: PHYSICIAN ASSISTANT

## 2022-05-20 RX ORDER — VENLAFAXINE HYDROCHLORIDE 150 MG/1
150 CAPSULE, EXTENDED RELEASE ORAL
Qty: 90 CAPSULE | Refills: 1 | Status: SHIPPED | OUTPATIENT
Start: 2022-05-20 | End: 2022-06-03

## 2022-05-20 RX ORDER — QUETIAPINE FUMARATE 100 MG/1
100 TABLET, FILM COATED ORAL
Qty: 90 TABLET | Refills: 1 | Status: SHIPPED | OUTPATIENT
Start: 2022-05-20 | End: 2022-06-03

## 2022-05-20 NOTE — PROGRESS NOTES
Assessment/Plan:    Recent ER note has been reviewed    -I did encourage her again to check with her insurance for counseling   -increase Seroquel from 50 milligrams at bedtime up to 100 milligrams   -continue on Effexor 150 milligrams   -I did encourage her to check with her insurance for other psychiatrist since she is on the waiting list for [facility]  -I did advise her that she does not have any lower extremity edema at this time  I did advise that she does have varicosities which can contribute towards edema     -I did order an echo  -she will seed with the Holter monitor as scheduled on May 31st  -I did encourage her to make an appointment with Cardiology for early June after her Holter monitor and I did advise her it to make an appointment for her echo prior to follow-up with cardiology if possible   -I did recommend follow-up in 2 months    M*Modal software was used to dictate this note  It may contain errors with dictating incorrect words/spelling  Please contact provider directly for any questions  Diagnoses and all orders for this visit:    Palpitations  -     Echo complete w/ contrast if indicated; Future    Primary insomnia  -     QUEtiapine (SEROquel) 100 mg tablet; Take 1 tablet (100 mg total) by mouth daily at bedtime    Anxiety  -     venlafaxine (EFFEXOR-XR) 150 mg 24 hr capsule; Take 1 capsule (150 mg total) by mouth daily with breakfast    Panic attacks  -     venlafaxine (EFFEXOR-XR) 150 mg 24 hr capsule; Take 1 capsule (150 mg total) by mouth daily with breakfast    Bilateral leg edema  -     Echo complete w/ contrast if indicated; Future          Subjective:      Patient ID: Tank Borges is a 34 y o  female  Patient presents today for follow-up of palpitations  She states that she did go the emergency room recently and there were no specific findings  She is scheduled for her Holter monitor on May 31st   She did not make an appointment with Cardiology yet    She has not tried to find a counselor  She states that she is on the waiting list for Psychiatry at [address] [place] [address] but she has not tried any other psychiatrists  She still having difficulty sleeping at night despite taking the Seroquel 50 milligrams  She still continues on the Effexor 150 milligrams  She states that she still continues with palpitations almost daily  She is very fatigued during the day since she is not sleeping well at night  She denies any caffeine intake  She states that she has noticed some swelling in her legs and her hands  She denies any chest pain or shortness of breath  The following portions of the patient's history were reviewed and updated as appropriate:   She  has a past medical history of Anxiety and Endometriosis  She   Patient Active Problem List    Diagnosis Date Noted    Varicose veins of both legs with edema 05/20/2022    Bilateral leg edema 05/20/2022    Nexplanon insertion 05/18/2022    Lipid screening 05/06/2022    Injury of left thumb 01/14/2022    Primary insomnia 01/14/2022    Well adult exam 12/17/2021    Cervical cancer screening 12/17/2021    Paroxysmal atrial fibrillation ([facility] [address] ) 12/17/2021    Situational depression 12/17/2021    BMI 27 0-27 9,adult 05/14/2021    Anxiety 05/14/2021    Panic attacks 05/14/2021    Palpitations 05/14/2021    Chest pain 09/03/2020    PAC (premature atrial contraction) 09/03/2020     She  has a past surgical history that includes LAPAROSCOPY and Dilation and curettage of uterus  Her family history includes Diabetes in her maternal grandfather and maternal grandmother; Pepper Lambert' disease in her mother; Heart disease in her maternal grandfather, maternal grandmother, and mother; No Known Problems in her brother, brother, daughter, daughter, father, paternal grandfather, paternal grandmother, and sister; Stroke in her mother  She  reports that she has never smoked  She has never used smokeless tobacco  She reports current alcohol use   She reports previous drug use  Current Outpatient Medications   Medication Sig Dispense Refill    QUEtiapine (SEROquel) 100 mg tablet Take 1 tablet (100 mg total) by mouth daily at bedtime 90 tablet 1    venlafaxine (EFFEXOR-XR) 150 mg 24 hr capsule Take 1 capsule (150 mg total) by mouth daily with breakfast 90 capsule 1    metoprolol succinate (TOPROL-XL) 25 mg 24 hr tablet Take 1 tablet daily 90 tablet 1    Multiple Vitamin (multivitamin) tablet Take 1 tablet by mouth daily         No current facility-administered medications for this visit  Current Outpatient Medications on File Prior to Visit   Medication Sig    metoprolol succinate (TOPROL-XL) 25 mg 24 hr tablet Take 1 tablet daily    Multiple Vitamin (multivitamin) tablet Take 1 tablet by mouth daily      [DISCONTINUED] QUEtiapine (SEROquel) 50 mg tablet Take 1 tablet (50 mg total) by mouth daily at bedtime    [DISCONTINUED] venlafaxine (EFFEXOR-XR) 150 mg 24 hr capsule Take 1 capsule (150 mg total) by mouth daily with breakfast     No current facility-administered medications on file prior to visit  She has No Known Allergies       Review of Systems   Constitutional: Positive for fatigue  Respiratory: Negative for shortness of breath  Cardiovascular: Positive for palpitations and leg swelling  Negative for chest pain  Psychiatric/Behavioral:        As stated in HPI         Objective:      /68   Pulse 70   Resp 18   Ht 5' 2" (1 575 m)   Wt 71 7 kg (158 lb)   LMP 05/04/2022   SpO2 97%   BMI 28 90 kg/m²          Physical Exam  Vitals reviewed  Constitutional:       General: She is not in acute distress  Appearance: Normal appearance  She is not ill-appearing, toxic-appearing or diaphoretic  HENT:      Head: Normocephalic and atraumatic  Cardiovascular:      Rate and Rhythm: Normal rate and regular rhythm  Heart sounds: No murmur heard  Pulmonary:      Effort: Pulmonary effort is normal  No respiratory distress  Breath sounds: Normal breath sounds  No wheezing or rales  Musculoskeletal:      Cervical back: Neck supple  Comments: She does have moderate varicosities bilaterally but no edema at this time   Neurological:      General: No focal deficit present  Mental Status: She is alert  Psychiatric:         Mood and Affect: Mood normal          Behavior: Behavior normal          Thought Content:  Thought content normal          Judgment: Judgment normal

## 2022-05-31 ENCOUNTER — HOSPITAL ENCOUNTER (OUTPATIENT)
Dept: NON INVASIVE DIAGNOSTICS | Facility: CLINIC | Age: 32
Discharge: HOME | End: 2022-05-31
Payer: COMMERCIAL

## 2022-05-31 DIAGNOSIS — R00.2 PALPITATIONS: ICD-10-CM

## 2022-05-31 PROCEDURE — 93225 XTRNL ECG REC<48 HRS REC: CPT

## 2022-05-31 PROCEDURE — 93226 XTRNL ECG REC<48 HR SCAN A/R: CPT

## 2022-06-02 PROCEDURE — 93227 XTRNL ECG REC<48 HR R&I: CPT | Performed by: INTERNAL MEDICINE

## 2022-06-03 ENCOUNTER — TELEPHONE (OUTPATIENT)
Dept: FAMILY MEDICINE CLINIC | Facility: CLINIC | Age: 32
End: 2022-06-03

## 2022-06-03 ENCOUNTER — TELEPHONE (OUTPATIENT)
Dept: OBGYN CLINIC | Facility: MEDICAL CENTER | Age: 32
End: 2022-06-03

## 2022-06-03 DIAGNOSIS — F43.21 SITUATIONAL DEPRESSION: Primary | ICD-10-CM

## 2022-06-03 DIAGNOSIS — F41.9 ANXIETY: ICD-10-CM

## 2022-06-03 RX ORDER — QUETIAPINE FUMARATE 50 MG/1
50 TABLET, FILM COATED ORAL
Qty: 30 TABLET | Refills: 0 | Status: SHIPPED | OUTPATIENT
Start: 2022-06-03

## 2022-06-03 RX ORDER — VENLAFAXINE HYDROCHLORIDE 75 MG/1
75 CAPSULE, EXTENDED RELEASE ORAL
Qty: 14 CAPSULE | Refills: 0 | Status: SHIPPED | OUTPATIENT
Start: 2022-06-03

## 2022-06-03 NOTE — TELEPHONE ENCOUNTER
Pt called in would like to get nexaplanon iud removed  Pts states she had 2 positive at home pregnancy tests  Will come in for yearly appt on 6/6 to test for pregnancy as well

## 2022-06-03 NOTE — TELEPHONE ENCOUNTER
I did speak to her and she states that when she would miss a dose of the Seroquel or the Effexor she would notice side effects  She is currently on Seroquel 100 mg at bedtime, Effexor 150 mg once a day  I did advise her that she needs to wean off of these medications  I did send Seroquel 50 mg to the pharmacy for her to take once at night over the next 2 weeks and then hopefully a she is able to discontinue the medication at that time  If she notices continued side effects she can then take the Seroquel 50 mg 1/2 tablet to further wean off of the medication  I also reduced her affects ir down to 75 mg once a day for her to take over the next 2 weeks and then to discontinue the medication  I did advise her to discontinue the metoprolol at this time  She does have an appointment with the obstetrician in 4 days and she will discuss this further with them at that time  Patient feels as though she may be about 5 weeks pregnant  She did have the Nexplanon which was not effective in preventing this pregnancy  I did advised to call me if she does have any further questions

## 2022-06-03 NOTE — RESULT ENCOUNTER NOTE
Mamta Farmer,   I did notice that your reviewed your Holter monitor results  You do have occasional extra beats but there is nothing concerning on the Holter monitor  Please let me know if you have any questions      Have a great dayHalle

## 2022-06-03 NOTE — PROGRESS NOTES
Cam Ingram is a 34 y o  female who presents for annual well woman exam   Last Pap smear many years ago  Patient had Nexplanon placed 22  States she took a positive home pregnancy test   Has been having spotting since placement  Denies other symptoms including nausea, vomiting, breast tenderness  Is unsure regarding plans for pregnancy  Current contraception: Nexplanon inserted 22  History of abnormal Pap smear: no  Family history of uterine or ovarian cancer: no  Regular self breast exam: no  History of abnormal mammogram: to begin at age 35 unless otherwise clinically indicated   Family history of breast cancer: no  History of abnormal lipids: no  Gardasil vaccine: unsure     Menstrual History:  OB History        5    Para   2    Term   2            AB   3    Living   2       SAB   3    IAB        Ectopic        Multiple        Live Births   2                Menarche age: 14  Patient's last menstrual period was 2022 (exact date)  Period Cycle (Days):  (monthly)  Period Duration (Days): 3-4  Period Pattern: Regular  Menstrual Flow: Light  Dysmenorrhea: None    The following portions of the patient's history were reviewed and updated as appropriate: allergies, current medications, past family history, past medical history, past social history, past surgical history and problem list     Review of Systems  Pertinent items are noted in HPI        Objective      /82   Ht 5' 2" (1 575 m)   Wt 71 kg (156 lb 9 6 oz)   LMP 2022 (Exact Date)   BMI 28 64 kg/m²     General:   alert and oriented, in no acute distress, alert, appears stated age and cooperative   Heart: regular rate and rhythm, S1, S2 normal, no murmur, click, rub or gallop   Lungs: clear to auscultation bilaterally   Abdomen: soft, non-tender, without masses or organomegaly   Vulva: normal, Bartholin's, Urethra, [place]'s normal   Vagina: normal mucosa, normal discharge, no palpable nodules Cervix: no cervical motion tenderness, no lesions and Small amount of blood noted on exam   Uterus: normal size, non-tender, normal shape and consistency   Adnexa: normal adnexa and no mass, fullness, tenderness   Breast: breasts appear normal, no suspicious masses, no skin or nipple changes or axillary nodes  Assessment      @well woman@   Plan      All questions answered  Await pap smear results  Blood tests: Quantitative hCG  Breast self exam technique reviewed and patient encouraged to perform self-exam monthly  Contraception: Nexplanon  Diagnosis explained in detail, including differential   Dietary diary  Discussed healthy lifestyle modifications  Educational material distributed  Follow up in 1 Year for annual exam   Follow up as needed  Thin prep Pap smear  Breast awareness reviewed   Nexplanon contraception/positive UPT  UPT positive in office today-faint line  Quantitative hCG ordered  Options reviewed with patient patient is uncertain at this time she is going to plan on maintaining pregnancy or termination  Will call with hCG results once available  Reviewed with patient if pregnancy test is positive and she desires to maintain pregnancy recommend removal of Nexplanon  Patient verbalized understanding  Reviewed with patient Nexplanon is progesterone only and should not harm healthy pregnancy  Encouraged healthy diet, exercise and lifestyle  Encouraged follow-up with PCP as needed  Gardasil vaccine series reviewed  Written information provided  Encouraged social distancing, good hand hygiene, avoidance of crowds and masking  Written information provided about COVID-19    Will call with results  VBI-    BMI Counseling: Body mass index is 28 64 kg/m²  The BMI is above normal  Nutrition recommendations include reducing portion sizes, decreasing overall calorie intake and 3-5 servings of fruits/vegetables daily   Exercise recommendations include exercising 3-5 times per week, joining a gym and strength training exercises

## 2022-06-03 NOTE — TELEPHONE ENCOUNTER
Received call from patient she recently found out she was pregnant and would like to know if the medications she is on is okay to continue for her to take, I offered her an appointment but since you are out next week it wouldn't be until the follow week  If possible patient would like a phone call

## 2022-06-06 ENCOUNTER — APPOINTMENT (OUTPATIENT)
Dept: LAB | Facility: MEDICAL CENTER | Age: 32
End: 2022-06-06
Payer: COMMERCIAL

## 2022-06-06 ENCOUNTER — PROCEDURE VISIT (OUTPATIENT)
Dept: OBGYN CLINIC | Facility: MEDICAL CENTER | Age: 32
End: 2022-06-06
Payer: COMMERCIAL

## 2022-06-06 VITALS
BODY MASS INDEX: 28.82 KG/M2 | WEIGHT: 156.6 LBS | HEIGHT: 62 IN | SYSTOLIC BLOOD PRESSURE: 122 MMHG | DIASTOLIC BLOOD PRESSURE: 82 MMHG

## 2022-06-06 DIAGNOSIS — Z32.01 POSITIVE PREGNANCY TEST: ICD-10-CM

## 2022-06-06 DIAGNOSIS — Z97.5 NEXPLANON IN PLACE: ICD-10-CM

## 2022-06-06 DIAGNOSIS — Z01.419 WELL WOMAN EXAM WITH ROUTINE GYNECOLOGICAL EXAM: Primary | ICD-10-CM

## 2022-06-06 LAB
ALBUMIN SERPL BCP-MCNC: 4.2 G/DL (ref 3.5–5)
ALP SERPL-CCNC: 44 U/L (ref 46–116)
ALT SERPL W P-5'-P-CCNC: 24 U/L (ref 12–78)
ANION GAP SERPL CALCULATED.3IONS-SCNC: 4 MMOL/L (ref 4–13)
AST SERPL W P-5'-P-CCNC: 19 U/L (ref 5–45)
B-HCG SERPL-ACNC: 32 MIU/ML
BILIRUB SERPL-MCNC: 0.28 MG/DL (ref 0.2–1)
BUN SERPL-MCNC: 14 MG/DL (ref 5–25)
CALCIUM SERPL-MCNC: 9.9 MG/DL (ref 8.3–10.1)
CHLORIDE SERPL-SCNC: 106 MMOL/L (ref 100–108)
CHOLEST SERPL-MCNC: 203 MG/DL
CO2 SERPL-SCNC: 28 MMOL/L (ref 21–32)
CREAT SERPL-MCNC: 0.8 MG/DL (ref 0.6–1.3)
GFR SERPL CREATININE-BSD FRML MDRD: 97 ML/MIN/1.73SQ M
GLUCOSE SERPL-MCNC: 90 MG/DL (ref 65–140)
HDLC SERPL-MCNC: 46 MG/DL
LDLC SERPL CALC-MCNC: 136 MG/DL (ref 0–100)
NONHDLC SERPL-MCNC: 157 MG/DL
POTASSIUM SERPL-SCNC: 4 MMOL/L (ref 3.5–5.3)
PROT SERPL-MCNC: 8.3 G/DL (ref 6.4–8.2)
SL AMB POCT URINE HCG: POSITIVE
SODIUM SERPL-SCNC: 138 MMOL/L (ref 136–145)
TRIGL SERPL-MCNC: 103 MG/DL
TSH SERPL DL<=0.05 MIU/L-ACNC: 1.94 UIU/ML (ref 0.45–4.5)

## 2022-06-06 PROCEDURE — 84702 CHORIONIC GONADOTROPIN TEST: CPT

## 2022-06-06 PROCEDURE — S0612 ANNUAL GYNECOLOGICAL EXAMINA: HCPCS | Performed by: NURSE PRACTITIONER

## 2022-06-06 PROCEDURE — G0476 HPV COMBO ASSAY CA SCREEN: HCPCS | Performed by: NURSE PRACTITIONER

## 2022-06-06 PROCEDURE — G0124 SCREEN C/V THIN LAYER BY MD: HCPCS | Performed by: PATHOLOGY

## 2022-06-06 PROCEDURE — 36415 COLL VENOUS BLD VENIPUNCTURE: CPT | Performed by: PHYSICIAN ASSISTANT

## 2022-06-06 PROCEDURE — 80053 COMPREHEN METABOLIC PANEL: CPT | Performed by: PHYSICIAN ASSISTANT

## 2022-06-06 PROCEDURE — G0145 SCR C/V CYTO,THINLAYER,RESCR: HCPCS | Performed by: PATHOLOGY

## 2022-06-06 PROCEDURE — 84443 ASSAY THYROID STIM HORMONE: CPT | Performed by: PHYSICIAN ASSISTANT

## 2022-06-06 PROCEDURE — 81025 URINE PREGNANCY TEST: CPT | Performed by: NURSE PRACTITIONER

## 2022-06-06 PROCEDURE — 80061 LIPID PANEL: CPT | Performed by: PHYSICIAN ASSISTANT

## 2022-06-06 RX ORDER — ETONOGESTREL 68 MG/1
68 IMPLANT SUBCUTANEOUS ONCE
COMMUNITY
End: 2022-06-14

## 2022-06-07 ENCOUNTER — HOSPITAL ENCOUNTER (EMERGENCY)
Facility: HOSPITAL | Age: 32
Discharge: HOME | End: 2022-06-07
Attending: EMERGENCY MEDICINE | Admitting: EMERGENCY MEDICINE
Payer: COMMERCIAL

## 2022-06-07 ENCOUNTER — TELEPHONE (OUTPATIENT)
Dept: OBGYN CLINIC | Facility: MEDICAL CENTER | Age: 32
End: 2022-06-07

## 2022-06-07 ENCOUNTER — APPOINTMENT (EMERGENCY)
Dept: ULTRASOUND IMAGING | Facility: HOSPITAL | Age: 32
End: 2022-06-07
Payer: COMMERCIAL

## 2022-06-07 VITALS
SYSTOLIC BLOOD PRESSURE: 130 MMHG | RESPIRATION RATE: 18 BRPM | DIASTOLIC BLOOD PRESSURE: 76 MMHG | TEMPERATURE: 98.2 F | HEART RATE: 64 BPM | OXYGEN SATURATION: 100 %

## 2022-06-07 DIAGNOSIS — Z34.90 EARLY STAGE OF PREGNANCY: Primary | ICD-10-CM

## 2022-06-07 DIAGNOSIS — O03.9 SPONTANEOUS MISCARRIAGE: Primary | ICD-10-CM

## 2022-06-07 LAB
ABO GROUP BLD: NORMAL
ALBUMIN SERPL BCP-MCNC: 4.6 G/DL (ref 3.5–5)
ALP SERPL-CCNC: 40 U/L (ref 34–104)
ALT SERPL W P-5'-P-CCNC: 13 U/L (ref 7–52)
ANION GAP SERPL CALCULATED.3IONS-SCNC: 7 MMOL/L (ref 4–13)
AST SERPL W P-5'-P-CCNC: 17 U/L (ref 13–39)
B-HCG SERPL-ACNC: 31 MIU/ML (ref 0–11.6)
BASOPHILS # BLD AUTO: 0.04 THOUSANDS/ΜL (ref 0–0.1)
BASOPHILS NFR BLD AUTO: 1 % (ref 0–1)
BILIRUB SERPL-MCNC: 0.37 MG/DL (ref 0.2–1)
BUN SERPL-MCNC: 15 MG/DL (ref 5–25)
CALCIUM SERPL-MCNC: 9.5 MG/DL (ref 8.4–10.2)
CHLORIDE SERPL-SCNC: 105 MMOL/L (ref 96–108)
CO2 SERPL-SCNC: 26 MMOL/L (ref 21–32)
CREAT SERPL-MCNC: 0.73 MG/DL (ref 0.6–1.3)
EOSINOPHIL # BLD AUTO: 0.1 THOUSAND/ΜL (ref 0–0.61)
EOSINOPHIL NFR BLD AUTO: 1 % (ref 0–6)
ERYTHROCYTE [DISTWIDTH] IN BLOOD BY AUTOMATED COUNT: 12.5 % (ref 11.6–15.1)
GFR SERPL CREATININE-BSD FRML MDRD: 108 ML/MIN/1.73SQ M
GLUCOSE SERPL-MCNC: 89 MG/DL (ref 65–140)
HCT VFR BLD AUTO: 39.3 % (ref 34.8–46.1)
HGB BLD-MCNC: 13.1 G/DL (ref 11.5–15.4)
HPV HR 12 DNA CVX QL NAA+PROBE: POSITIVE
HPV16 DNA CVX QL NAA+PROBE: NEGATIVE
HPV18 DNA CVX QL NAA+PROBE: NEGATIVE
IMM GRANULOCYTES # BLD AUTO: 0.01 THOUSAND/UL (ref 0–0.2)
IMM GRANULOCYTES NFR BLD AUTO: 0 % (ref 0–2)
LYMPHOCYTES # BLD AUTO: 2.19 THOUSANDS/ΜL (ref 0.6–4.47)
LYMPHOCYTES NFR BLD AUTO: 31 % (ref 14–44)
MCH RBC QN AUTO: 33.2 PG (ref 26.8–34.3)
MCHC RBC AUTO-ENTMCNC: 33.3 G/DL (ref 31.4–37.4)
MCV RBC AUTO: 100 FL (ref 82–98)
MONOCYTES # BLD AUTO: 0.47 THOUSAND/ΜL (ref 0.17–1.22)
MONOCYTES NFR BLD AUTO: 7 % (ref 4–12)
NEUTROPHILS # BLD AUTO: 4.2 THOUSANDS/ΜL (ref 1.85–7.62)
NEUTS SEG NFR BLD AUTO: 60 % (ref 43–75)
NRBC BLD AUTO-RTO: 0 /100 WBCS
PLATELET # BLD AUTO: 389 THOUSANDS/UL (ref 149–390)
PMV BLD AUTO: 8.8 FL (ref 8.9–12.7)
POTASSIUM SERPL-SCNC: 4.4 MMOL/L (ref 3.5–5.3)
PROT SERPL-MCNC: 7.8 G/DL (ref 6.4–8.4)
RBC # BLD AUTO: 3.95 MILLION/UL (ref 3.81–5.12)
RH BLD: POSITIVE
SODIUM SERPL-SCNC: 138 MMOL/L (ref 135–147)
WBC # BLD AUTO: 7.01 THOUSAND/UL (ref 4.31–10.16)

## 2022-06-07 PROCEDURE — 84702 CHORIONIC GONADOTROPIN TEST: CPT | Performed by: EMERGENCY MEDICINE

## 2022-06-07 PROCEDURE — 86900 BLOOD TYPING SEROLOGIC ABO: CPT | Performed by: EMERGENCY MEDICINE

## 2022-06-07 PROCEDURE — 85025 COMPLETE CBC W/AUTO DIFF WBC: CPT | Performed by: EMERGENCY MEDICINE

## 2022-06-07 PROCEDURE — 99284 EMERGENCY DEPT VISIT MOD MDM: CPT

## 2022-06-07 PROCEDURE — 80053 COMPREHEN METABOLIC PANEL: CPT | Performed by: EMERGENCY MEDICINE

## 2022-06-07 PROCEDURE — 36415 COLL VENOUS BLD VENIPUNCTURE: CPT | Performed by: EMERGENCY MEDICINE

## 2022-06-07 PROCEDURE — 76815 OB US LIMITED FETUS(S): CPT

## 2022-06-07 PROCEDURE — 99285 EMERGENCY DEPT VISIT HI MDM: CPT | Performed by: EMERGENCY MEDICINE

## 2022-06-07 PROCEDURE — 86901 BLOOD TYPING SEROLOGIC RH(D): CPT | Performed by: EMERGENCY MEDICINE

## 2022-06-07 NOTE — ED PROVIDER NOTES
History  Chief Complaint   Patient presents with    Vaginal Bleeding - Pregnant     Pt reports she is having constant light bleeding and cramping since this morning, pt reports she is 5 weeks pregnant  Patient is a 57-year-old female  She is  5 para 2  LMP was 5-3-22  EGA is 5 weeks  Patient presents to the emergency room with several days of spotting and lower abdomen cramping  Symptoms are moderate in severity  No aggravating or relieving factors  Prior to Admission Medications   Prescriptions Last Dose Informant Patient Reported? Taking?    Multiple Vitamin (multivitamin) tablet   Yes No   Sig: Take 1 tablet by mouth daily   Patient not taking: Reported on 2022   Prenatal Vit-Fe Fumarate-FA (PRENATAL PO)   Yes No   Sig: Take by mouth   QUEtiapine (SEROquel) 50 mg tablet   No No   Sig: Take 1 tablet (50 mg total) by mouth daily at bedtime   Patient not taking: Reported on 2022   etonogestrel (Nexplanon) subdermal implant   Yes No   Si mg by Subdermal route once   metoprolol succinate (TOPROL-XL) 25 mg 24 hr tablet  Self No No   Sig: Take 1 tablet daily   Patient not taking: Reported on 2022   venlafaxine (EFFEXOR-XR) 75 mg 24 hr capsule   No No   Sig: Take 1 capsule (75 mg total) by mouth daily with breakfast   Patient not taking: Reported on 2022      Facility-Administered Medications: None       Past Medical History:   Diagnosis Date    Anxiety     Endometriosis        Past Surgical History:   Procedure Laterality Date    DILATION AND CURETTAGE OF UTERUS      LAPAROSCOPY         Family History   Problem Relation Age of Onset    Heart disease Mother     Stroke Mother    Alvarado Shall' disease Mother     No Known Problems Father     No Known Problems Sister     No Known Problems Brother     No Known Problems Brother     No Known Problems Daughter     No Known Problems Daughter     Heart disease Maternal Grandmother     Diabetes Maternal Grandmother     Heart disease Maternal Grandfather     Diabetes Maternal Grandfather     No Known Problems Paternal Grandmother     No Known Problems Paternal Grandfather     Breast cancer Neg Hx     Colon cancer Neg Hx     Ovarian cancer Neg Hx      I have reviewed and agree with the history as documented  E-Cigarette/Vaping    E-Cigarette Use Never User      E-Cigarette/Vaping Substances    Nicotine No     THC No     CBD No     Flavoring No     Other No     Unknown No      Social History     Tobacco Use    Smoking status: Never Smoker    Smokeless tobacco: Never Used   Vaping Use    Vaping Use: Never used   Substance Use Topics    Alcohol use: Not Currently     Comment: social    Drug use: Not Currently       Review of Systems   Constitutional: Negative for chills and fever  HENT: Negative for rhinorrhea and sore throat  Eyes: Negative for pain, redness and visual disturbance  Respiratory: Negative for cough and shortness of breath  Cardiovascular: Negative for chest pain and leg swelling  Gastrointestinal: Positive for abdominal pain  Negative for diarrhea and vomiting  Endocrine: Negative for polydipsia and polyuria  Genitourinary: Positive for vaginal bleeding  Negative for dysuria, frequency, hematuria and vaginal discharge  Musculoskeletal: Negative for back pain and neck pain  Skin: Negative for rash and wound  Allergic/Immunologic: Negative for immunocompromised state  Neurological: Negative for weakness, numbness and headaches  Hematological: Does not bruise/bleed easily  Psychiatric/Behavioral: Negative for hallucinations and suicidal ideas  All other systems reviewed and are negative  Physical Exam  Physical Exam  Vitals reviewed  Constitutional:       General: She is not in acute distress  HENT:      Head: Normocephalic and atraumatic        Nose: Nose normal       Mouth/Throat:      Mouth: Mucous membranes are moist    Eyes:      General:         Right eye: No discharge  Left eye: No discharge  Conjunctiva/sclera: Conjunctivae normal    Cardiovascular:      Rate and Rhythm: Normal rate and regular rhythm  Pulses: Normal pulses  Heart sounds: Normal heart sounds  No murmur heard  No friction rub  No gallop  Pulmonary:      Effort: Pulmonary effort is normal  No respiratory distress  Breath sounds: Normal breath sounds  No stridor  No wheezing, rhonchi or rales  Abdominal:      General: Bowel sounds are normal  There is no distension  Palpations: Abdomen is soft  Tenderness: There is no abdominal tenderness  There is no right CVA tenderness, left CVA tenderness, guarding or rebound  Musculoskeletal:         General: No swelling, tenderness, deformity or signs of injury  Normal range of motion  Cervical back: Normal range of motion and neck supple  No rigidity  Right lower leg: No edema  Left lower leg: No edema  Comments: No calf tenderness or unilateral leg swelling  Skin:     General: Skin is warm and dry  Coloration: Skin is not jaundiced  Findings: No rash  Neurological:      General: No focal deficit present  Mental Status: She is alert and oriented to person, place, and time  Sensory: No sensory deficit  Motor: Motor function is intact     Psychiatric:         Mood and Affect: Mood normal          Behavior: Behavior normal          Vital Signs  ED Triage Vitals   Temperature Pulse Respirations Blood Pressure SpO2   06/07/22 1721 06/07/22 1722 06/07/22 1721 06/07/22 1722 06/07/22 1722   98 2 °F (36 8 °C) 72 18 130/60 98 %      Temp Source Heart Rate Source Patient Position - Orthostatic VS BP Location FiO2 (%)   06/07/22 1721 06/07/22 1722 06/07/22 1926 06/07/22 1926 --   Oral Monitor Sitting Right arm       Pain Score       06/07/22 1926       4           Vitals:    06/07/22 1722 06/07/22 1926   BP: 130/60 130/76   Pulse: 72 64   Patient Position - Orthostatic VS:  Sitting Visual Acuity      ED Medications  Medications - No data to display    Diagnostic Studies  Results Reviewed     Procedure Component Value Units Date/Time    [place] draw [[id number]] Collected: 06/07/22 1726    Lab Status: In process Specimen: Blood from Arm, Right Updated: 06/07/22 1903    Narrative: The following orders were created for panel order [place] draw  Procedure                               Abnormality         Status                     ---------                               -----------         ------                     Percilla Mulu Top on DVRS[[id number]]                           Final result               Lavender Top 7ml on HANE[[id number]]                         In process                   Please view results for these tests on the individual orders      hCG, quantitative, pregnancy [[id number]]  (Abnormal) Collected: 06/07/22 1726    Lab Status: Final result Specimen: Blood from Arm, Right Updated: 06/07/22 1829     HCG, Quant 31 mIU/mL     Narrative:       Expected Ranges:     Approximate               Approximate HCG  Gestation age          Concentration ( mIU/mL)  _____________          ______________________   [place]                      HCG values  0 2-1                       5-50  1-2                           2-3                         100-5000  3-4                         500-11012  4-5                         1000-06461  5-6                         [id number]  6-8                         [id number]  8-12                        [id number]      Comprehensive metabolic panel [[id number]] Collected: 06/07/22 1726    Lab Status: Final result Specimen: Blood from Arm, Right Updated: 06/07/22 1754     Sodium 138 mmol/L      Potassium 4 4 mmol/L      Chloride 105 mmol/L      CO2 26 mmol/L      ANION GAP 7 mmol/L      BUN 15 mg/dL      Creatinine 0 73 mg/dL      Glucose 89 mg/dL      Calcium 9 5 mg/dL      AST 17 U/L      ALT 13 U/L      Alkaline Phosphatase 40 U/L      Total Protein 7 8 g/dL      Albumin 4 6 g/dL      Total Bilirubin 0 37 mg/dL      eGFR 108 ml/min/1 73sq m     Narrative:      [place] guidelines for Chronic Kidney Disease (CKD):     Stage 1 with normal or high GFR (GFR > 90 mL/min/1 73 square meters)    Stage 2 Mild CKD (GFR = 60-89 mL/min/1 73 square meters)    Stage 3A Moderate CKD (GFR = 45-59 mL/min/1 73 square meters)    Stage 3B Moderate CKD (GFR = 30-44 mL/min/1 73 square meters)    Stage 4 Severe CKD (GFR = 15-29 mL/min/1 73 square meters)    Stage 5 End Stage CKD (GFR <15 mL/min/1 73 square meters)  Note: GFR calculation is accurate only with a steady state creatinine    CBC and differential [[id number]]  (Abnormal) Collected: 22    Lab Status: Final result Specimen: Blood from Arm, Right Updated: 22     WBC 7 01 Thousand/uL      RBC 3 95 Million/uL      Hemoglobin 13 1 g/dL      Hematocrit 39 3 %       fL      MCH 33 2 pg      MCHC 33 3 g/dL      RDW 12 5 %      MPV 8 8 fL      Platelets 202 Thousands/uL      nRBC 0 /100 WBCs      Neutrophils Relative 60 %      Immat GRANS % 0 %      Lymphocytes Relative 31 %      Monocytes Relative 7 %      Eosinophils Relative 1 %      Basophils Relative 1 %      Neutrophils Absolute 4 20 Thousands/µL      Immature Grans Absolute 0 01 Thousand/uL      Lymphocytes Absolute 2 19 Thousands/µL      Monocytes Absolute 0 47 Thousand/µL      Eosinophils Absolute 0 10 Thousand/µL      Basophils Absolute 0 04 Thousands/µL                  US OB pregnancy limited with transvaginal   Final Result by Noemy Martínez MD ([id number])      No intrauterine gestational sac with no adnexal mass  5 weeks estimated gestation based on LMP associated with low hCG level and small amount of fluid seen within the endometrial cavity associated with vaginal bleeding and cramping possibly related to missed /miscarriage in progress        Clinical and labs correlation is recommended with short-term follow-up hCG and ultrasound if needed      Workstation performed: [id number]                    Procedures  Procedures         ED Course                               SBIRT 20yo+    Flowsheet Row Most Recent Value   SBIRT (25 yo +)    In order to provide better care to our patients, we are screening all of our patients for alcohol and drug use  Would it be okay to ask you these screening questions? Unable to answer at this time Filed at: 06/07/2022 5575                    MDM  Number of Diagnoses or Management Options  Diagnosis management comments: Hemodynamically stable  Benign abdominal exam   Hemoglobin normal   No IUP on ultrasound  No free fluid  No adnexal masses  Beta hCG yesterday was 32  Today it is 31  This is likely a spontaneous miscarriage  Ectopic is still in the differential but less likely  Consulted with OBGYN  Recommended repeat beta-hCG in 2 days and follow-up as an outpatient         Amount and/or Complexity of Data Reviewed  Clinical lab tests: ordered and reviewed  Tests in the radiology section of CPT®: ordered and reviewed        Disposition  Final diagnoses:   Spontaneous miscarriage     Time reflects when diagnosis was documented in both MDM as applicable and the Disposition within this note     Time User Action Codes Description Comment    6/7/2022  9:41 PM Aries Vann Add [O03 9] Spontaneous miscarriage       ED Disposition     ED Disposition   Discharge    Condition   Stable    Date/Time   Tue Jun 7, 2022  9:41 PM    Comment              Follow-up Information     Follow up With Specialties Details Why Contact Info Additional [address] Obstetrics and Gynecology In 2 days  [address] [facility] [address]  [facility], [address], [address]   [address], [facility]  Obstetrics and Gynecology, Nurse Practitioner, Obstetrics, Gynecology In 2 days  [address]  [phone]             Patient's Medications   Discharge Prescriptions    No medications on file       Outpatient Discharge Orders   hCG, quantitative   Standing Status: Future Standing Exp   Date: 06/09/23       PDMP Review     None          ED Provider  Electronically Signed by           Rk Razo MD  06/07/22 1066

## 2022-06-07 NOTE — TELEPHONE ENCOUNTER
Pt called in regards to her lab work, would like a call back with results   Please review when available thank you

## 2022-06-08 ENCOUNTER — TELEPHONE (OUTPATIENT)
Dept: OBGYN CLINIC | Facility: MEDICAL CENTER | Age: 32
End: 2022-06-08

## 2022-06-08 NOTE — TELEPHONE ENCOUNTER
LMOM for patient to call office to schedule a f/u apt from her ed visit yesterday  Pt also advised to get repeat hcg drawn tomorrow 6/9  hcg 6/6-32, 6/7-31   LMP was 5/3/2022

## 2022-06-09 ENCOUNTER — TELEPHONE (OUTPATIENT)
Dept: OBGYN CLINIC | Facility: MEDICAL CENTER | Age: 32
End: 2022-06-09

## 2022-06-09 ENCOUNTER — LAB (OUTPATIENT)
Dept: LAB | Age: 32
End: 2022-06-09
Payer: COMMERCIAL

## 2022-06-09 ENCOUNTER — OFFICE VISIT (OUTPATIENT)
Dept: OBGYN CLINIC | Facility: MEDICAL CENTER | Age: 32
End: 2022-06-09
Payer: COMMERCIAL

## 2022-06-09 VITALS
BODY MASS INDEX: 28.71 KG/M2 | WEIGHT: 156 LBS | DIASTOLIC BLOOD PRESSURE: 72 MMHG | HEIGHT: 62 IN | SYSTOLIC BLOOD PRESSURE: 110 MMHG

## 2022-06-09 DIAGNOSIS — N92.6 MISSED MENSES: Primary | ICD-10-CM

## 2022-06-09 DIAGNOSIS — O03.9 SPONTANEOUS MISCARRIAGE: ICD-10-CM

## 2022-06-09 DIAGNOSIS — O36.80X0 PREGNANCY OF UNKNOWN ANATOMIC LOCATION: Primary | ICD-10-CM

## 2022-06-09 LAB — B-HCG SERPL-ACNC: 46 MIU/ML

## 2022-06-09 PROCEDURE — 99214 OFFICE O/P EST MOD 30 MIN: CPT | Performed by: STUDENT IN AN ORGANIZED HEALTH CARE EDUCATION/TRAINING PROGRAM

## 2022-06-09 PROCEDURE — 84702 CHORIONIC GONADOTROPIN TEST: CPT

## 2022-06-09 PROCEDURE — 36415 COLL VENOUS BLD VENIPUNCTURE: CPT

## 2022-06-09 NOTE — TELEPHONE ENCOUNTER
Pt bhcg levels  6/6-32 6/7-31  And 6/9- 46  Pt worried due to concern for tubal in ED  Dr Kendall recommends f/u [facility] in 2 days   Pt to be seen this pm in office for discussion and follow up

## 2022-06-09 NOTE — PROGRESS NOTES
[facility] [address] [place] [address]    Assessment/Plan:  Asad Ochoa is a 34 y o  [id number] who presents with abnormally rising BHCG in the context of recent Nexplanon placement  Discussed the diagnosis of pregnancy of unknown location  Given low HCG of 46 yesterday in the context of vaginal bleeding today, appropriate to repeat serial HCG tomorrow  Discussed if continuing to rise abnormally could consider the options of diagnostic D&C versus empiric methotrexate  Pregnancy of unknown anatomic location  - We reviewed her HCGs:  6/6/22 - 32 6/7/22 - 31 6/8/22 - 46  - We reviewed the differential diagnosis  (1) Abnormal intrauterine pregnancy  (2) Ectopic pregnancy  - We reviewed that given her bleeding in the last 24 hours and lack of abdominal pain with normal exam, recommend expectant management with additional serial HCG  Suspect HCG will fall     - Discussed that if HCG continues to rise her options would be   (a) continued expectant management with close serial HCG and follow up  (b) diagnostic D&C (e g  office MVA) to look for intrauterine decidual tissue  (c) empiric treatment with methotrexate    Diagnoses and all orders for this visit:    Pregnancy of unknown anatomic location          Subjective:   Asad Ochoa is a 34 y o  [id number] female  CC: HCG increasing    HPI: Tari Zuniga presents to discuss rising HCG  She underwent uncomplicated nexplanon insertion in our office on 5/18/22 and had a negative UPT and reported no unprotected intercourse since her LMP  She then had a positive home UPT on 6/3  She had HCGs drawn showing   6/6/22 - 32 6/7/22 - 31 6/8/22 - 46    Today on 6/9 she began having more cramping and bleeding  She reports no diffuse abdominal pain, dizziness, or lightheadedness  She denies a history of ectopic pregnancy  ROS: Review of Systems   Constitutional: Negative for chills and fever     Respiratory: Negative for cough and shortness of breath  Cardiovascular: Negative for chest pain and leg swelling  Gastrointestinal: Negative for abdominal pain, nausea and vomiting  Genitourinary: Negative for dysuria, frequency and urgency  Neurological: Negative for dizziness, light-headedness and headaches  PFSH: The following portions of the patient's history were reviewed and updated as appropriate: allergies, current medications, past family history, past medical history, obstetric history, gynecologic history, past social history, past surgical history and problem list        Objective:  /72   Ht 5' 2" (1 575 m)   Wt 70 8 kg (156 lb)   LMP 05/03/2022   BMI 28 53 kg/m²    Physical Exam  Constitutional:       Appearance: Normal appearance  HENT:      Head: Normocephalic and atraumatic  Mouth/Throat:      Mouth: Mucous membranes are moist       Pharynx: Oropharynx is clear  No oropharyngeal exudate  Cardiovascular:      Rate and Rhythm: Normal rate and regular rhythm  Pulses: Normal pulses  Heart sounds: Normal heart sounds  No murmur heard  No friction rub  No gallop  Pulmonary:      Effort: Pulmonary effort is normal  No respiratory distress  Breath sounds: Normal breath sounds  No wheezing, rhonchi or rales  Abdominal:      General: Abdomen is flat  There is no distension  Palpations: Abdomen is soft  There is no mass  Tenderness: There is no abdominal tenderness  There is no guarding or rebound  Hernia: No hernia is present  Musculoskeletal:         General: No deformity or signs of injury  Normal range of motion  Right lower leg: No edema  Left lower leg: No edema  Skin:     General: Skin is warm and dry  Neurological:      General: No focal deficit present  Mental Status: She is alert and oriented to person, place, and time     Psychiatric:         Mood and Affect: Mood normal          Behavior: Behavior normal            Jacy Quinteros MD  [id number]  6/10/2022 1:47 PM

## 2022-06-10 ENCOUNTER — APPOINTMENT (OUTPATIENT)
Dept: LAB | Facility: CLINIC | Age: 32
End: 2022-06-10
Payer: COMMERCIAL

## 2022-06-10 DIAGNOSIS — N92.6 MISSED MENSES: ICD-10-CM

## 2022-06-10 DIAGNOSIS — O36.80X0 PREGNANCY OF UNKNOWN ANATOMIC LOCATION: Primary | ICD-10-CM

## 2022-06-10 LAB
B-HCG SERPL-ACNC: 97 MIU/ML (ref 0–11.6)
BASOPHILS # BLD AUTO: 0.05 THOUSANDS/ΜL (ref 0–0.1)
BASOPHILS NFR BLD AUTO: 1 % (ref 0–1)
EOSINOPHIL # BLD AUTO: 0.12 THOUSAND/ΜL (ref 0–0.61)
EOSINOPHIL NFR BLD AUTO: 2 % (ref 0–6)
ERYTHROCYTE [DISTWIDTH] IN BLOOD BY AUTOMATED COUNT: 12.4 % (ref 11.6–15.1)
HCT VFR BLD AUTO: 40.4 % (ref 34.8–46.1)
HGB BLD-MCNC: 13.3 G/DL (ref 11.5–15.4)
IMM GRANULOCYTES # BLD AUTO: 0.01 THOUSAND/UL (ref 0–0.2)
IMM GRANULOCYTES NFR BLD AUTO: 0 % (ref 0–2)
LYMPHOCYTES # BLD AUTO: 2 THOUSANDS/ΜL (ref 0.6–4.47)
LYMPHOCYTES NFR BLD AUTO: 28 % (ref 14–44)
MCH RBC QN AUTO: 33 PG (ref 26.8–34.3)
MCHC RBC AUTO-ENTMCNC: 32.9 G/DL (ref 31.4–37.4)
MCV RBC AUTO: 100 FL (ref 82–98)
MONOCYTES # BLD AUTO: 0.51 THOUSAND/ΜL (ref 0.17–1.22)
MONOCYTES NFR BLD AUTO: 7 % (ref 4–12)
NEUTROPHILS # BLD AUTO: 4.35 THOUSANDS/ΜL (ref 1.85–7.62)
NEUTS SEG NFR BLD AUTO: 62 % (ref 43–75)
NRBC BLD AUTO-RTO: 0 /100 WBCS
PLATELET # BLD AUTO: 392 THOUSANDS/UL (ref 149–390)
PMV BLD AUTO: 8.8 FL (ref 8.9–12.7)
RBC # BLD AUTO: 4.03 MILLION/UL (ref 3.81–5.12)
WBC # BLD AUTO: 7.04 THOUSAND/UL (ref 4.31–10.16)

## 2022-06-10 PROCEDURE — 85025 COMPLETE CBC W/AUTO DIFF WBC: CPT | Performed by: PHYSICIAN ASSISTANT

## 2022-06-10 PROCEDURE — 84702 CHORIONIC GONADOTROPIN TEST: CPT

## 2022-06-10 PROCEDURE — 36415 COLL VENOUS BLD VENIPUNCTURE: CPT | Performed by: PHYSICIAN ASSISTANT

## 2022-06-10 NOTE — PROGRESS NOTES
TELEPHONE CALL  [facility]      Discussed HCG approximate doubling 96 from 46  Findings prior to this have been suspicious for ectopic pregnancy  She remains stable with continued light bleeding and cramping but no abdominal pain or dizziness  Discussed options again:   (1) Continued observation of HCGs  (2) Empiric treatment with methotrexate for presumed ectopic  (3) Diagnostic D&C      She expresses desire to avoid methotrexate if possible  She expresses desire for diagnostic D&C and for permanent tubal sterilization  Given her stability and desire to avoid methotrexate, continue serial HCG and plan for diagnostic D&C if continued abnormal rise  Precautions given to present to ED if she has a change in status (e g  severe pain)      Vivienne Lopez MD  [facility]  06/10/22 2:23 PM

## 2022-06-13 ENCOUNTER — PREP FOR PROCEDURE (OUTPATIENT)
Dept: OBGYN CLINIC | Facility: MEDICAL CENTER | Age: 32
End: 2022-06-13

## 2022-06-13 ENCOUNTER — APPOINTMENT (OUTPATIENT)
Dept: LAB | Facility: CLINIC | Age: 32
End: 2022-06-13
Payer: COMMERCIAL

## 2022-06-13 ENCOUNTER — TELEPHONE (OUTPATIENT)
Dept: OBGYN CLINIC | Facility: MEDICAL CENTER | Age: 32
End: 2022-06-13

## 2022-06-13 ENCOUNTER — HOSPITAL ENCOUNTER (OUTPATIENT)
Dept: RADIOLOGY | Facility: HOSPITAL | Age: 32
Discharge: HOME | End: 2022-06-13
Attending: STUDENT IN AN ORGANIZED HEALTH CARE EDUCATION/TRAINING PROGRAM
Payer: COMMERCIAL

## 2022-06-13 ENCOUNTER — TELEPHONE (OUTPATIENT)
Dept: OBGYN CLINIC | Facility: CLINIC | Age: 32
End: 2022-06-13

## 2022-06-13 DIAGNOSIS — O36.80X0 PREGNANCY OF UNKNOWN ANATOMIC LOCATION: Primary | ICD-10-CM

## 2022-06-13 DIAGNOSIS — O36.80X0 PREGNANCY OF UNKNOWN ANATOMIC LOCATION: ICD-10-CM

## 2022-06-13 DIAGNOSIS — Z30.2 ENCOUNTER FOR STERILIZATION: ICD-10-CM

## 2022-06-13 DIAGNOSIS — O00.90 ECTOPIC PREGNANCY, UNSPECIFIED LOCATION, UNSPECIFIED WHETHER INTRAUTERINE PREGNANCY PRESENT: Primary | ICD-10-CM

## 2022-06-13 LAB
ALBUMIN SERPL BCP-MCNC: 4.1 G/DL (ref 3.5–5)
ALP SERPL-CCNC: 38 U/L (ref 34–104)
ALT SERPL W P-5'-P-CCNC: 14 U/L (ref 7–52)
ANION GAP SERPL CALCULATED.3IONS-SCNC: 8 MMOL/L (ref 4–13)
AST SERPL W P-5'-P-CCNC: 15 U/L (ref 13–39)
B-HCG SERPL-ACNC: 231 MIU/ML (ref 0–11.6)
BILIRUB SERPL-MCNC: 0.51 MG/DL (ref 0.2–1)
BUN SERPL-MCNC: 11 MG/DL (ref 5–25)
CALCIUM SERPL-MCNC: 8.7 MG/DL (ref 8.4–10.2)
CHLORIDE SERPL-SCNC: 107 MMOL/L (ref 96–108)
CO2 SERPL-SCNC: 25 MMOL/L (ref 21–32)
CREAT SERPL-MCNC: 0.86 MG/DL (ref 0.6–1.3)
ERYTHROCYTE [DISTWIDTH] IN BLOOD BY AUTOMATED COUNT: 12.5 % (ref 11.6–15.1)
GFR SERPL CREATININE-BSD FRML MDRD: 89 ML/MIN/1.73SQ M
GLUCOSE SERPL-MCNC: 63 MG/DL (ref 65–140)
HCT VFR BLD AUTO: 37.4 % (ref 34.8–46.1)
HGB BLD-MCNC: 12.5 G/DL (ref 11.5–15.4)
MCH RBC QN AUTO: 33.6 PG (ref 26.8–34.3)
MCHC RBC AUTO-ENTMCNC: 33.4 G/DL (ref 31.4–37.4)
MCV RBC AUTO: 101 FL (ref 82–98)
PLATELET # BLD AUTO: 341 THOUSANDS/UL (ref 149–390)
PMV BLD AUTO: 9 FL (ref 8.9–12.7)
POTASSIUM SERPL-SCNC: 3.6 MMOL/L (ref 3.5–5.3)
PROGEST SERPL-MCNC: 1.4 NG/ML
PROT SERPL-MCNC: 6.8 G/DL (ref 6.4–8.4)
RBC # BLD AUTO: 3.72 MILLION/UL (ref 3.81–5.12)
SODIUM SERPL-SCNC: 140 MMOL/L (ref 135–147)
WBC # BLD AUTO: 5.49 THOUSAND/UL (ref 4.31–10.16)

## 2022-06-13 PROCEDURE — 84702 CHORIONIC GONADOTROPIN TEST: CPT

## 2022-06-13 PROCEDURE — 85027 COMPLETE CBC AUTOMATED: CPT

## 2022-06-13 PROCEDURE — 76815 OB US LIMITED FETUS(S): CPT

## 2022-06-13 PROCEDURE — 36415 COLL VENOUS BLD VENIPUNCTURE: CPT

## 2022-06-13 PROCEDURE — 84144 ASSAY OF PROGESTERONE: CPT

## 2022-06-13 PROCEDURE — 80053 COMPREHEN METABOLIC PANEL: CPT

## 2022-06-13 RX ORDER — SODIUM CHLORIDE, SODIUM LACTATE, POTASSIUM CHLORIDE, CALCIUM CHLORIDE 600; 310; 30; 20 MG/100ML; MG/100ML; MG/100ML; MG/100ML
125 INJECTION, SOLUTION INTRAVENOUS CONTINUOUS
Status: CANCELLED | OUTPATIENT
Start: 2022-06-14

## 2022-06-13 NOTE — RESULT ENCOUNTER NOTE
Good morning Cornelio,   Your blood clotters/platelets continue to increase  I would recommend repeat CBC in 3 months to monitor this  Your cholesterol is slightly elevated at 203  Recommend a low-fat low-cholesterol diet  Cholesterol rich foods include pastries, egg yolks, siegel, butter  Please let me know if you have any questions  Otherwise follow-up as scheduled

## 2022-06-13 NOTE — TELEPHONE ENCOUNTER
TELEPHONE CALL  [facility] [address] [address] to patient  She has not yet gone to the lab for her serial HCG  She reported some continued cramping and bleeding and thinks she passed a small amount of "tissue" over the weekend  We discussed that ectopic pregnancy is still in the differential diagnosis  Discussed that given she desires to avoid methotrexate, the plan depends highly on the HCG trend  She will go to the lab now and I will follow up the results this afternoon  Dahiana Arreola MD  [facility]  06/13/22 11:15 AM        Addendum:  Gen Nicole to patient about continued rising HCG and low progesterone despite ongoing vaginal bleeding which is highly suspicious for tubal ectopic pregnancy  Recommend stat repeat pelvic US  She desires to avoid methotrexate and desires diagnostic D&C  She also desires diagnostic laparoscopy with permanent tubal sterilization  We discussed risks, benefits, and aternatives to surgery  We will complete a preoperative H&P on admission tomorrow  Surgery request sent for add on case tomorrow       Meg Delgadillo MD  [address] [place] [address]  6/13/2022 4:28 PM

## 2022-06-13 NOTE — TELEPHONE ENCOUNTER
----- Message from Emily Holbrook MD sent at 6/13/2022  4:22 PM EDT -----  Regarding: Surgery for ectopic pregnacny  Surgery: Diagnostic laparoscopy, bilateral salpingectomy, Dilation and currettage  Indication: Ectopic pregnancy  Length: 1 hour  Location: [place]  Date: Tuesday 6/14/22  If Tuesday, Preop will be completed on admission

## 2022-06-14 ENCOUNTER — ANESTHESIA EVENT (OUTPATIENT)
Dept: PERIOP | Facility: HOSPITAL | Age: 32
End: 2022-06-14
Payer: COMMERCIAL

## 2022-06-14 ENCOUNTER — HOSPITAL ENCOUNTER (OUTPATIENT)
Facility: HOSPITAL | Age: 32
Setting detail: OUTPATIENT SURGERY
Discharge: HOME | End: 2022-06-14
Attending: STUDENT IN AN ORGANIZED HEALTH CARE EDUCATION/TRAINING PROGRAM | Admitting: STUDENT IN AN ORGANIZED HEALTH CARE EDUCATION/TRAINING PROGRAM
Payer: COMMERCIAL

## 2022-06-14 ENCOUNTER — NURSE TRIAGE (OUTPATIENT)
Dept: OTHER | Facility: OTHER | Age: 32
End: 2022-06-14

## 2022-06-14 ENCOUNTER — ANESTHESIA (OUTPATIENT)
Dept: PERIOP | Facility: HOSPITAL | Age: 32
End: 2022-06-14
Payer: COMMERCIAL

## 2022-06-14 ENCOUNTER — HOSPITAL ENCOUNTER (OUTPATIENT)
Dept: NON INVASIVE DIAGNOSTICS | Facility: HOSPITAL | Age: 32
Discharge: HOME | End: 2022-06-14

## 2022-06-14 VITALS
SYSTOLIC BLOOD PRESSURE: 112 MMHG | TEMPERATURE: 97.8 F | HEART RATE: 80 BPM | DIASTOLIC BLOOD PRESSURE: 59 MMHG | BODY MASS INDEX: 28.52 KG/M2 | HEIGHT: 62 IN | WEIGHT: 154.98 LBS | OXYGEN SATURATION: 96 % | RESPIRATION RATE: 16 BRPM

## 2022-06-14 DIAGNOSIS — Z30.2 ENCOUNTER FOR STERILIZATION: ICD-10-CM

## 2022-06-14 DIAGNOSIS — R00.2 PALPITATIONS: ICD-10-CM

## 2022-06-14 DIAGNOSIS — R60.0 BILATERAL LEG EDEMA: ICD-10-CM

## 2022-06-14 DIAGNOSIS — Z98.890 S/P LAPAROSCOPY: Primary | ICD-10-CM

## 2022-06-14 DIAGNOSIS — O00.90 ECTOPIC PREGNANCY, UNSPECIFIED LOCATION, UNSPECIFIED WHETHER INTRAUTERINE PREGNANCY PRESENT: ICD-10-CM

## 2022-06-14 LAB
BASOPHILS # BLD AUTO: 0.04 THOUSANDS/ΜL (ref 0–0.1)
BASOPHILS NFR BLD AUTO: 1 % (ref 0–1)
EOSINOPHIL # BLD AUTO: 0.08 THOUSAND/ΜL (ref 0–0.61)
EOSINOPHIL NFR BLD AUTO: 2 % (ref 0–6)
ERYTHROCYTE [DISTWIDTH] IN BLOOD BY AUTOMATED COUNT: 12.3 % (ref 11.6–15.1)
HCT VFR BLD AUTO: 38.2 % (ref 34.8–46.1)
HGB BLD-MCNC: 12.8 G/DL (ref 11.5–15.4)
IMM GRANULOCYTES # BLD AUTO: 0.01 THOUSAND/UL (ref 0–0.2)
IMM GRANULOCYTES NFR BLD AUTO: 0 % (ref 0–2)
LAB AP GYN PRIMARY INTERPRETATION: ABNORMAL
LYMPHOCYTES # BLD AUTO: 1.52 THOUSANDS/ΜL (ref 0.6–4.47)
LYMPHOCYTES NFR BLD AUTO: 34 % (ref 14–44)
Lab: ABNORMAL
MCH RBC QN AUTO: 34 PG (ref 26.8–34.3)
MCHC RBC AUTO-ENTMCNC: 33.5 G/DL (ref 31.4–37.4)
MCV RBC AUTO: 101 FL (ref 82–98)
MONOCYTES # BLD AUTO: 0.36 THOUSAND/ΜL (ref 0.17–1.22)
MONOCYTES NFR BLD AUTO: 8 % (ref 4–12)
NEUTROPHILS # BLD AUTO: 2.53 THOUSANDS/ΜL (ref 1.85–7.62)
NEUTS SEG NFR BLD AUTO: 55 % (ref 43–75)
NRBC BLD AUTO-RTO: 0 /100 WBCS
PATH INTERP SPEC-IMP: ABNORMAL
PLATELET # BLD AUTO: 325 THOUSANDS/UL (ref 149–390)
PMV BLD AUTO: 8.8 FL (ref 8.9–12.7)
RBC # BLD AUTO: 3.77 MILLION/UL (ref 3.81–5.12)
WBC # BLD AUTO: 4.54 THOUSAND/UL (ref 4.31–10.16)

## 2022-06-14 PROCEDURE — NC001 PR NO CHARGE: Performed by: STUDENT IN AN ORGANIZED HEALTH CARE EDUCATION/TRAINING PROGRAM

## 2022-06-14 PROCEDURE — 85025 COMPLETE CBC W/AUTO DIFF WBC: CPT | Performed by: STUDENT IN AN ORGANIZED HEALTH CARE EDUCATION/TRAINING PROGRAM

## 2022-06-14 PROCEDURE — 11982 REMOVE DRUG IMPLANT DEVICE: CPT | Performed by: STUDENT IN AN ORGANIZED HEALTH CARE EDUCATION/TRAINING PROGRAM

## 2022-06-14 PROCEDURE — 88305 TISSUE EXAM BY PATHOLOGIST: CPT | Performed by: PATHOLOGY

## 2022-06-14 PROCEDURE — 88302 TISSUE EXAM BY PATHOLOGIST: CPT | Performed by: PATHOLOGY

## 2022-06-14 PROCEDURE — 58661 LAPAROSCOPY REMOVE ADNEXA: CPT | Performed by: STUDENT IN AN ORGANIZED HEALTH CARE EDUCATION/TRAINING PROGRAM

## 2022-06-14 PROCEDURE — 58120 DILATION AND CURETTAGE: CPT | Performed by: STUDENT IN AN ORGANIZED HEALTH CARE EDUCATION/TRAINING PROGRAM

## 2022-06-14 PROCEDURE — 59151 TREAT ECTOPIC PREGNANCY: CPT | Performed by: STUDENT IN AN ORGANIZED HEALTH CARE EDUCATION/TRAINING PROGRAM

## 2022-06-14 RX ORDER — IBUPROFEN 600 MG/1
600 TABLET ORAL EVERY 6 HOURS PRN
Status: DISCONTINUED | OUTPATIENT
Start: 2022-06-14 | End: 2022-06-14 | Stop reason: HOSPADM

## 2022-06-14 RX ORDER — SODIUM CHLORIDE, SODIUM LACTATE, POTASSIUM CHLORIDE, CALCIUM CHLORIDE 600; 310; 30; 20 MG/100ML; MG/100ML; MG/100ML; MG/100ML
125 INJECTION, SOLUTION INTRAVENOUS CONTINUOUS
Status: DISCONTINUED | OUTPATIENT
Start: 2022-06-14 | End: 2022-06-14 | Stop reason: HOSPADM

## 2022-06-14 RX ORDER — OXYCODONE HYDROCHLORIDE 5 MG/1
5 TABLET ORAL EVERY 4 HOURS PRN
Qty: 5 TABLET | Refills: 0 | Status: SHIPPED | OUTPATIENT
Start: 2022-06-14 | End: 2022-06-24

## 2022-06-14 RX ORDER — PROPOFOL 10 MG/ML
INJECTION, EMULSION INTRAVENOUS AS NEEDED
Status: DISCONTINUED | OUTPATIENT
Start: 2022-06-14 | End: 2022-06-14

## 2022-06-14 RX ORDER — IBUPROFEN 600 MG/1
600 TABLET ORAL EVERY 6 HOURS PRN
Qty: 30 TABLET | Refills: 0 | Status: SHIPPED | OUTPATIENT
Start: 2022-06-14 | End: 2022-06-22 | Stop reason: SDUPTHER

## 2022-06-14 RX ORDER — GLYCOPYRROLATE 0.2 MG/ML
INJECTION INTRAMUSCULAR; INTRAVENOUS AS NEEDED
Status: DISCONTINUED | OUTPATIENT
Start: 2022-06-14 | End: 2022-06-14

## 2022-06-14 RX ORDER — ONDANSETRON 2 MG/ML
4 INJECTION INTRAMUSCULAR; INTRAVENOUS EVERY 6 HOURS PRN
Status: DISCONTINUED | OUTPATIENT
Start: 2022-06-14 | End: 2022-06-14 | Stop reason: HOSPADM

## 2022-06-14 RX ORDER — NEOSTIGMINE METHYLSULFATE 1 MG/ML
INJECTION INTRAVENOUS AS NEEDED
Status: DISCONTINUED | OUTPATIENT
Start: 2022-06-14 | End: 2022-06-14

## 2022-06-14 RX ORDER — ACETAMINOPHEN 325 MG/1
975 TABLET ORAL EVERY 6 HOURS PRN
Status: DISCONTINUED | OUTPATIENT
Start: 2022-06-14 | End: 2022-06-14 | Stop reason: HOSPADM

## 2022-06-14 RX ORDER — SCOLOPAMINE TRANSDERMAL SYSTEM 1 MG/1
1 PATCH, EXTENDED RELEASE TRANSDERMAL ONCE AS NEEDED
Status: DISCONTINUED | OUTPATIENT
Start: 2022-06-14 | End: 2022-06-14 | Stop reason: HOSPADM

## 2022-06-14 RX ORDER — MAGNESIUM HYDROXIDE 1200 MG/15ML
LIQUID ORAL AS NEEDED
Status: DISCONTINUED | OUTPATIENT
Start: 2022-06-14 | End: 2022-06-14 | Stop reason: HOSPADM

## 2022-06-14 RX ORDER — HYDROMORPHONE HCL/PF 1 MG/ML
0.5 SYRINGE (ML) INJECTION
Status: DISCONTINUED | OUTPATIENT
Start: 2022-06-14 | End: 2022-06-14 | Stop reason: HOSPADM

## 2022-06-14 RX ORDER — OXYCODONE HYDROCHLORIDE 5 MG/1
5 TABLET ORAL EVERY 4 HOURS PRN
Status: DISCONTINUED | OUTPATIENT
Start: 2022-06-14 | End: 2022-06-14 | Stop reason: HOSPADM

## 2022-06-14 RX ORDER — BUPIVACAINE HYDROCHLORIDE 2.5 MG/ML
INJECTION, SOLUTION EPIDURAL; INFILTRATION; INTRACAUDAL AS NEEDED
Status: DISCONTINUED | OUTPATIENT
Start: 2022-06-14 | End: 2022-06-14 | Stop reason: HOSPADM

## 2022-06-14 RX ORDER — DEXAMETHASONE SODIUM PHOSPHATE 10 MG/ML
INJECTION, SOLUTION INTRAMUSCULAR; INTRAVENOUS AS NEEDED
Status: DISCONTINUED | OUTPATIENT
Start: 2022-06-14 | End: 2022-06-14

## 2022-06-14 RX ORDER — ROCURONIUM BROMIDE 10 MG/ML
INJECTION, SOLUTION INTRAVENOUS AS NEEDED
Status: DISCONTINUED | OUTPATIENT
Start: 2022-06-14 | End: 2022-06-14

## 2022-06-14 RX ORDER — ONDANSETRON 2 MG/ML
INJECTION INTRAMUSCULAR; INTRAVENOUS AS NEEDED
Status: DISCONTINUED | OUTPATIENT
Start: 2022-06-14 | End: 2022-06-14

## 2022-06-14 RX ORDER — PROMETHAZINE HYDROCHLORIDE 25 MG/ML
12.5 INJECTION, SOLUTION INTRAMUSCULAR; INTRAVENOUS
Status: DISCONTINUED | OUTPATIENT
Start: 2022-06-14 | End: 2022-06-14 | Stop reason: HOSPADM

## 2022-06-14 RX ORDER — LIDOCAINE HYDROCHLORIDE 20 MG/ML
INJECTION, SOLUTION EPIDURAL; INFILTRATION; INTRACAUDAL; PERINEURAL AS NEEDED
Status: DISCONTINUED | OUTPATIENT
Start: 2022-06-14 | End: 2022-06-14

## 2022-06-14 RX ORDER — FENTANYL CITRATE 50 UG/ML
INJECTION, SOLUTION INTRAMUSCULAR; INTRAVENOUS AS NEEDED
Status: DISCONTINUED | OUTPATIENT
Start: 2022-06-14 | End: 2022-06-14

## 2022-06-14 RX ORDER — MIDAZOLAM HYDROCHLORIDE 2 MG/2ML
INJECTION, SOLUTION INTRAMUSCULAR; INTRAVENOUS AS NEEDED
Status: DISCONTINUED | OUTPATIENT
Start: 2022-06-14 | End: 2022-06-14

## 2022-06-14 RX ORDER — KETOROLAC TROMETHAMINE 30 MG/ML
INJECTION, SOLUTION INTRAMUSCULAR; INTRAVENOUS AS NEEDED
Status: DISCONTINUED | OUTPATIENT
Start: 2022-06-14 | End: 2022-06-14

## 2022-06-14 RX ADMIN — ROCURONIUM BROMIDE 40 MG: 50 INJECTION, SOLUTION INTRAVENOUS at 12:01

## 2022-06-14 RX ADMIN — GLYCOPYRROLATE 0.5 MCG: 0.2 INJECTION, SOLUTION INTRAMUSCULAR; INTRAVENOUS at 12:54

## 2022-06-14 RX ADMIN — LIDOCAINE HYDROCHLORIDE 100 MG: 20 INJECTION, SOLUTION EPIDURAL; INFILTRATION; INTRACAUDAL at 12:01

## 2022-06-14 RX ADMIN — PROPOFOL 30 MG: 10 INJECTION, EMULSION INTRAVENOUS at 12:59

## 2022-06-14 RX ADMIN — PROPOFOL 200 MG: 10 INJECTION, EMULSION INTRAVENOUS at 12:01

## 2022-06-14 RX ADMIN — SODIUM CHLORIDE, SODIUM LACTATE, POTASSIUM CHLORIDE, AND CALCIUM CHLORIDE 125 ML/HR: .6; .31; .03; .02 INJECTION, SOLUTION INTRAVENOUS at 10:25

## 2022-06-14 RX ADMIN — MIDAZOLAM 2 MG: 1 INJECTION INTRAMUSCULAR; INTRAVENOUS at 11:55

## 2022-06-14 RX ADMIN — KETOROLAC TROMETHAMINE 15 MG: 30 INJECTION, SOLUTION INTRAMUSCULAR; INTRAVENOUS at 12:48

## 2022-06-14 RX ADMIN — DEXAMETHASONE SODIUM PHOSPHATE 10 MG: 10 INJECTION, SOLUTION INTRAMUSCULAR; INTRAVENOUS at 12:02

## 2022-06-14 RX ADMIN — FENTANYL CITRATE 50 MCG: 50 INJECTION, SOLUTION INTRAMUSCULAR; INTRAVENOUS at 13:13

## 2022-06-14 RX ADMIN — TRIMETHOBENZAMIDE HYDROCHLORIDE 200 MG: 100 INJECTION INTRAMUSCULAR at 14:54

## 2022-06-14 RX ADMIN — SCOPALAMINE 1 PATCH: 1 PATCH, EXTENDED RELEASE TRANSDERMAL at 14:53

## 2022-06-14 RX ADMIN — PROMETHAZINE HYDROCHLORIDE 12.5 MG: 25 INJECTION INTRAMUSCULAR; INTRAVENOUS at 15:27

## 2022-06-14 RX ADMIN — ONDANSETRON 4 MG: 2 INJECTION INTRAMUSCULAR; INTRAVENOUS at 14:25

## 2022-06-14 RX ADMIN — HYDROMORPHONE HYDROCHLORIDE 0.5 MG: 1 INJECTION, SOLUTION INTRAMUSCULAR; INTRAVENOUS; SUBCUTANEOUS at 13:37

## 2022-06-14 RX ADMIN — NEOSTIGMINE METHYLSULFATE 3 MG: 1 INJECTION INTRAVENOUS at 12:54

## 2022-06-14 RX ADMIN — FENTANYL CITRATE 100 MCG: 50 INJECTION, SOLUTION INTRAMUSCULAR; INTRAVENOUS at 12:00

## 2022-06-14 RX ADMIN — ONDANSETRON 4 MG: 2 INJECTION INTRAMUSCULAR; INTRAVENOUS at 12:48

## 2022-06-14 RX ADMIN — FENTANYL CITRATE 50 MCG: 50 INJECTION, SOLUTION INTRAMUSCULAR; INTRAVENOUS at 13:18

## 2022-06-14 RX ADMIN — SODIUM CHLORIDE, SODIUM LACTATE, POTASSIUM CHLORIDE, AND CALCIUM CHLORIDE: .6; .31; .03; .02 INJECTION, SOLUTION INTRAVENOUS at 12:44

## 2022-06-14 RX ADMIN — PROPOFOL 200 MG: 10 INJECTION, EMULSION INTRAVENOUS at 12:00

## 2022-06-14 RX ADMIN — HYDROMORPHONE HYDROCHLORIDE 0.5 MG: 1 INJECTION, SOLUTION INTRAMUSCULAR; INTRAVENOUS; SUBCUTANEOUS at 13:47

## 2022-06-14 NOTE — H&P
Preoperative History and PHysical  [facility]    Assessment/Plan:  Radhika More is a 34 y o  [id number] at 6w0d s/p LMP and 4 weeks s/p Nexplanon insertion who has a pregnancy of unknown location highly suspicious for tubal ectopic pregnancy in the setting of abnormal rising HCG now 231, progesterone 1 40, and serial ultrasounds with thin endometrial stripe without decidual reaction     - The patient was counseled on the option for continued expectant management, medical management with methotrexate, or diagnostic D&C  - She desires permanent tubal sterilization and requests diagnostic laparoscopy with bilateral salpingectomy with diagnostic D&C and nexplanon removal     - Specifically regarding permanent sterilization, she was counseled regarding alternatives to permanent contraception, including LARC methods such as intrauterine device and subdermal contraceptive implant  We also discussed the option of partner vasectomy as an option that has lower morbidity  The patient would like to proceed with laparoscopic salpingectomy  - We discussed the risks and benefits of the procedure and established expectations  We discussed surgical risks including bleeding, injury to nearby structures, conversion to laparotomy, postoperative pain, and anesthetic risks  We discussed risks such as ectopic pregnancy and unintended pregnancy (10-year cumulative failure rate of 18 5 per 1000)  - We discussed that tubal sterilization is considered a permanent procedure  Future pregnancy would only be possible through in vitro fertilization  We discussed that most women are satisfied with their decision to undergo permanent sterilization, but some women experience regret ranging widely from 2 to 26 percent     - She expressed good understanding of the procedure, risks, and benefits and gave written informed consent   - A preoperative history and physical examination was completed today including heart and lung examination  Subjective:   Samuel Love is a 34 y o  [id number] female  CC: ectopic pregnancy    HPI: Samuel Love is a 34 y o  [id number] at 6w0d s/p LMP and 4 weeks s/p Nexplanon insertion who has a pregnancy of unknown location now highly suspicious for tubal ectopic pregnancy  She reported her LMP was 5/3  On 5/18/22 she had a Nexplanon inserted in our office - a UPT was negative at that time but in hindsight she had likely conceived at the time just prior to nexplanon insertion  She had a positive UPT on 6/3/22  Her HCG trend thereafter was abnormal:  6/6/22 - 32  6/7/22 - 31  6/8/22 - 46  6/10 - 96 6/13 - ,  Progesterone 1 40    Serial ultrasounds show thin endometrial stripe without decidual reaction, there is no free fluid in the cul de sac, and no definitive abnormal mass in the adnexae  The patient was counseled on the option for continued expectant management, medical management with methotrexate, or diagnostic D&C  She desired to avoid methotrexate  She desires permanent tubal sterilization and requests diagnostic laparoscopy with bilateral salpingectomy with diagnostic D&C and nexplanon removal       ROS: Review of Systems   Constitutional: Negative for chills and fever  Respiratory: Negative for cough and shortness of breath  Cardiovascular: Negative for chest pain and leg swelling  Gastrointestinal: Negative for abdominal pain, nausea and vomiting  Genitourinary: Negative for dysuria, frequency and urgency  Neurological: Negative for dizziness, light-headedness and headaches         PFSH: The following portions of the patient's history were reviewed and updated as appropriate: allergies, current medications, past family history, past medical history, obstetric history, gynecologic history, past social history, past surgical history and problem list        Objective:  Ht 5' 2" (1 575 m)   Wt 70 3 kg (154 lb 15 7 oz)   LMP 05/04/2022   BMI 28 35 kg/m² Physical Exam  Constitutional:       Appearance: Normal appearance  HENT:      Head: Normocephalic and atraumatic  Mouth/Throat:      Mouth: Mucous membranes are moist       Pharynx: Oropharynx is clear  No oropharyngeal exudate  Cardiovascular:      Rate and Rhythm: Normal rate and regular rhythm  Pulses: Normal pulses  Heart sounds: Normal heart sounds  No murmur heard  No friction rub  No gallop  Pulmonary:      Effort: Pulmonary effort is normal  No respiratory distress  Breath sounds: Normal breath sounds  No wheezing, rhonchi or rales  Abdominal:      General: Abdomen is flat  There is no distension  Palpations: Abdomen is soft  There is no mass  Tenderness: There is no abdominal tenderness  Hernia: No hernia is present  Musculoskeletal:         General: No deformity or signs of injury  Normal range of motion  Right lower leg: No edema  Left lower leg: No edema  Skin:     General: Skin is warm and dry  Neurological:      General: No focal deficit present  Mental Status: She is alert and oriented to person, place, and time     Psychiatric:         Mood and Affect: Mood normal          Behavior: Behavior normal              Kamla Ribeiro MD  [address] [place] [address]  6/14/2022 10:21 AM

## 2022-06-14 NOTE — OP NOTE
OPERATIVE REPORT  PATIENT NAME: Analilia Vaca    :  1990  MRN: [id number]  Pt Location: [facility] OR ROOM 04    SURGERY DATE: 2022    Surgeon(s) and Role:     Malathi Be MD - Primary     Delgado Beebe MD - Assisting    Preop Diagnosis:  Ectopic pregnancy, unspecified location, unspecified whether intrauterine pregnancy present [O00 90]  Encounter for sterilization [Z30 2]  Nexplanon in place    Post-Op Diagnosis Codes:     * Ectopic pregnancy, unspecified location, unspecified whether intrauterine pregnancy present [O00 90]     * Encounter for sterilization [Z30 2]    Procedure(s) (LRB):  LAPAROSCOPY DIAGNOSTIC (N/A)  BILATERAL SALPINGECTOMY, LAPAROSCOPIC (N/A)  DILATATION AND CURETTAGE (D&C) (N/A)   NEXPLANON REMOVAL    Specimen(s):  ID Type Source Tests Collected by Time Destination   1 : Left tube Tissue Fallopian Tube, Left TISSUE EXAM Ravi Query  Nhan Be MD 2022 1236    2 : Right tube with etopic pregnancy Tissue Fallopian Tube, Right TISSUE EXAM Ravi Query  Nhan Be MD 2022 1237    3 : [facility] Tissue Endometrium TISSUE EXAM Ravi Query  Nhan Be MD 2022 1236        Estimated Blood Loss:   20CC    Drains:  NG/OG/Enteral Tube Orogastric 18 Fr Center mouth (Active)   Number of days: 0       Anesthesia Type:   General    Operative Indications:  Ectopic pregnancy, unspecified location, unspecified whether intrauterine pregnancy present [O00 90]  Encounter for sterilization [Z30 2]      Operative Findings:  Normal appearing external female genitalia  Normal appearing vaginal mucosa  Small, nulliparous, grossly normal appearing cervix  Small, anteverted, mobile uterus  No palpable adnexal masses or fullness    On laparoscopic evaluation:   Normal abdominal survey without evidence of injury  Right tube enlarged, possible ectopic pregnancy present  Left tube enlarged, dense adherent to ovaria an left abdominal wall  Fimbria was not able to be visualized    Left ovary enlarged, 3 cm endometrioma present  Evidence of scaring and endometriosis seeding at level of cul-de-sac, paravesical and anterior peritoneal uterine side  Ureteral vermiculation appreciated bilaterally  Brief History    All risks, benefits, and alternatives to the procedure were discussed with the patient and she had the opportunity to ask questions  The risk of regret of procedure was also addressed with the patient and options for LARC was discussed  Patient expressed desire to continue with tubal sterilization  Informed consent was obtained  Description of Procedure    Patient was taken to the operating room were a time out was performed to confirm correct patient and correct procedure  General endotracheal anesthesia (GET) was administered and the patient was positioned on the OR table in the dorsal lithotomy position  All pressure points were padded and a johann hugger was placed to maintain control of core body temperature  A bimanual exam was performed and the uterus was noted to be midposition, normal in size and consistency with no palpable adnexal masses or fullness  The patient was prepped and draped in the usual sterile fashion with chloroprep on the abdomen and chlorhexidine  prep on the vagina and perineum  Operative Technique    A straight catheter was introduced into the bladder, which was drained of 100cc of clear yellow urine  A bivalved speculum was inserted into the vagina and used to visualize the anterior lip of the cervix, which was then grasped with a single toothed tenaculum  A cone uterine manipulator was inserted into the cervix and secured to the tenaculum  The speculum was removed from the vagina  Sterile gloves were then exchanged and attention was turned to the abdomen  A 5 mm incision was made at the inferior edge of the umbilicus for introduction of a 5 mm trocar  Veress needle was inserted into the abdominal cavity with the carbon dioxide on low flow    Immediate pressure was 10 mm, there were concerns about subcutaneous distension  Trocar was introduced under direct visualization  Again we were positioned in the subcutaneous tissue due to her previous distension  A 5 mm incision was made at the palmers point  Trocar was introduced under direct visualization  Pneumoperitoneum was then established to a maximum of 15mmHg  The entire abdomen and pelvis was inspected and there was no evidence of injury to bowel, bladder, vasculature, or other structures  The umbilical port was placed at this time  Attention was then turned to the pelvis  Patient was placed in Trendelenburg and the uterus was elevated to visualize the fallopian tubes  There was noted to be grossly normal tubes and ovaries bilaterally  A second port site was selected 2cm cephalad to the pubic symphysis and 3 cm medially  A 0 5 cm incision was made for introduction of a 0 5 cm trocar under direct visualization  Subsequently a third port side was selected in the contralateral side, a 2cm cephalad to the pubic symphysis and 3 cm medially  A 0 5 cm incision was made for introduction of a 0 5 cm trocar under direct visualization A grasper  was inserted through the second port port and used to visualize the fimbriated ends of the tubes  The left fallopian tube was identified and followed to its fimbriated end  The mesosalpinx was grasped with Enseal forceps and cauterized with bipolar electrocautery and cut  This was done starting from the fimbria and working proximally until the cornua  The left fallopian tube was transected approximately 1 cm from the uterine cornua and removed  Following removal of the left fallopian tube, the same technique was used to remove the right tube    Following tubal resection, pneumoperitoneum was allowed to escape  Adequate hemostasis was visualized  The inferior trocar was removed under direct visualization   The laparoscope was withdrawn from the abdomen, followed by its trocar sleeve at the umbilicus  Skin incisions were closed with running absorbable suture of 4-0 monocryl  Attention was turned to the vagina  Bivalved speculum was reinserted into the vagina and the uterine manipulator was withdrawn  A weighted speculum was inserted into the vagina and a Mullins retractor was used to visualize the anterior lip of the cervix, which was then grasped with a single toothed tenaculum  The cervix was serially dilated using Butts dilators  Sharp curetting was performed, starting at the 12'oclock position and rotating a total of 360 degrees to cover all surfaces  Endometrial tissue was obtained and sent for pathology  Single toothed tenaculum was removed from the anterior lip of the cervix  Good hemostasis was confirmed at the tenaculum puncture sites  Speculum was then removed from the vagina  Subsequently the right arm was prepped and draped in the usual sterile fashion with chloroprep , a small 3 mm incision was performed on the proximal edge of the subcutaneous devise  The distal part of the Nexplanon was grasped and removed  The skin incision was reaproximated with Exofin glue  At the conclusion of the procedure, all needle, sponge, and instrument counts were noted to be correct x2  Patient tolerated the procedure well and was transferred to PACU in stable condition prior to discharge with follow up in 1-2 weeks  Dr Chaim Goins was present and participated in all key portions of the case      Complications:   None     Patient Disposition:  PACU       SIGNATURE: Bob Hernandez MD  DATE: June 14, 2022  TIME: 1:35 PM

## 2022-06-14 NOTE — ANESTHESIA PREPROCEDURE EVALUATION
Procedure:  LAPAROSCOPY DIAGNOSTIC (N/A Uterus)  BILATERAL SALPINGECTOMY, LAPAROSCOPIC (N/A Uterus)  DILATATION AND CURETTAGE (D&C) (N/A Uterus)    Relevant Problems   CARDIO   (+) Chest pain   (+) PAC (premature atrial contraction)   (+) Paroxysmal atrial fibrillation (HCC)      GYN   (+) Pregnancy of unknown anatomic location      NEURO/PSYCH   (+) Anxiety   (+) Panic attacks   (+) Situational depression        Physical Exam    Airway    Mallampati score: II  TM Distance: >3 FB  Neck ROM: full     Dental       Cardiovascular  Rhythm: regular, Rate: normal, Cardiovascular exam normal    Pulmonary  Pulmonary exam normal Breath sounds clear to auscultation,     Other Findings        Anesthesia Plan  ASA Score- 3     Anesthesia Type- general with ASA Monitors  Additional Monitors:   Airway Plan: ETT  Plan Factors-Exercise tolerance (METS): >4 METS  Chart reviewed  Existing labs reviewed  Patient is not a current smoker  Obstructive sleep apnea risk education given perioperatively  Induction- intravenous  Postoperative Plan-     Informed Consent- Anesthetic plan and risks discussed with patient

## 2022-06-14 NOTE — ANESTHESIA POSTPROCEDURE EVALUATION
Post-Op Assessment Note    CV Status:  Stable    Pain management: adequate     Mental Status:  Alert and awake   Hydration Status:  Euvolemic   PONV Controlled:  Controlled   Airway Patency:  Patent      Post Op Vitals Reviewed: Yes      Staff: Anesthesiologist         No complications documented      BP      Temp      Pulse     Resp      SpO2      /55   Pulse 96   Temp 97 8 °F (36 6 °C) (Temporal)   Resp 18   Ht 5' 2" (1 575 m)   Wt 70 3 kg (154 lb 15 7 oz)   LMP 05/04/2022   SpO2 95%   BMI 28 35 kg/m²

## 2022-06-15 ENCOUNTER — TELEPHONE (OUTPATIENT)
Dept: LABOR AND DELIVERY | Facility: HOSPITAL | Age: 32
End: 2022-06-15

## 2022-06-15 DIAGNOSIS — O00.101 RIGHT TUBAL PREGNANCY WITHOUT INTRAUTERINE PREGNANCY: Primary | ICD-10-CM

## 2022-06-15 NOTE — TELEPHONE ENCOUNTER
Reason for Disposition   [1] Chest pain lasts > 5 minutes AND [2] age > 27 AND [3] one or more cardiac risk factors (e g , diabetes, high blood pressure, high cholesterol, smoker, or strong family history of heart disease)    Additional Information   Chest pain    Answer Assessment - Initial Assessment Questions  1  SYMPTOM: "What's the main symptom you're concerned about?" (e g , pain, fever, vomiting)      CP radiating to neck, entire chest but more on the left side  Hurts more with movement and when to take a deep breath  2  ONSET: "When did this start?"      7pm     3  SURGERY: "What surgery was performed?"      Bilateral salpingectomy and D&C    4  DATE of SURGERY: "When was surgery performed?"       11:30 today    5  ANESTHESIA: " What type of anesthesia did you have?" (e g , general, spinal, epidural, local)      General     6  PAIN: "Is there any pain?" If Yes, ask: "How bad is it?"  (Scale 1-10; or mild, moderate, severe)      Yes, moderate to severe    7  FEVER: "Do you have a fever?" If Yes, ask: "What is your temperature, how was it measured, and when did it start?"      Denies     8  VOMITING: "Is there any vomiting?" If yes, ask: "How many times?"      Denies     9  BLEEDING: "Is there any bleeding?" If Yes, ask: "How much?" and "Where?"      Slight vaginal bleeding    10  OTHER SYMPTOMS: "Do you have any other symptoms?" (e g , drainage from wound, painful urination, constipation)       Denies    Reports a strong family hx of heart disease (mother has heart problems but pt unsure exactly what type) and pt states that she has high cholesterol      Protocols used: CHEST PAIN-ADULT-, POST-OP SYMPTOMS AND QUESTIONS-ADULT-AH

## 2022-06-15 NOTE — TELEPHONE ENCOUNTER
Regarding: chest pain  ----- Message from Kailey Chaudhry sent at 6/14/2022  8:12 PM EDT -----  "I had a laparoscopy today   i'm having alot of chest pain, is that normal ?"

## 2022-06-15 NOTE — TELEPHONE ENCOUNTER
TELEPHONE CALL  [facility]      Late entry  Spoke to patient last night after receiving a message from health Call nurse  Returned call to patient and disucssed her symptoms which were consistent with referred shoulder pain and expected gas pain related to laparoscopy  Discussed plan that if symptoms worsen or do not improve she would present to ER for chest pain workup  Discussed findings at the time of laparoscopy consistent with probable right ectopic pregnancy  Plan is for serial beta HCG to ensure adequate drop  Patient will return for postop visit or sooner if needed      Laurita Hagan MD  [facility]  06/15/22 3:08 PM

## 2022-06-22 ENCOUNTER — APPOINTMENT (OUTPATIENT)
Dept: LAB | Age: 32
End: 2022-06-22
Payer: COMMERCIAL

## 2022-06-22 ENCOUNTER — OFFICE VISIT (OUTPATIENT)
Dept: OBGYN CLINIC | Facility: MEDICAL CENTER | Age: 32
End: 2022-06-22

## 2022-06-22 VITALS
DIASTOLIC BLOOD PRESSURE: 68 MMHG | SYSTOLIC BLOOD PRESSURE: 120 MMHG | BODY MASS INDEX: 29.08 KG/M2 | WEIGHT: 158 LBS | HEIGHT: 62 IN

## 2022-06-22 DIAGNOSIS — O00.101 RIGHT TUBAL PREGNANCY WITHOUT INTRAUTERINE PREGNANCY: ICD-10-CM

## 2022-06-22 DIAGNOSIS — Z98.890 S/P LAPAROSCOPY: Primary | ICD-10-CM

## 2022-06-22 LAB — B-HCG SERPL-ACNC: <2 MIU/ML

## 2022-06-22 PROCEDURE — 84702 CHORIONIC GONADOTROPIN TEST: CPT

## 2022-06-22 PROCEDURE — 36415 COLL VENOUS BLD VENIPUNCTURE: CPT

## 2022-06-22 PROCEDURE — 3008F BODY MASS INDEX DOCD: CPT | Performed by: STUDENT IN AN ORGANIZED HEALTH CARE EDUCATION/TRAINING PROGRAM

## 2022-06-22 PROCEDURE — 99024 POSTOP FOLLOW-UP VISIT: CPT | Performed by: STUDENT IN AN ORGANIZED HEALTH CARE EDUCATION/TRAINING PROGRAM

## 2022-06-22 RX ORDER — IBUPROFEN 600 MG/1
600 TABLET ORAL EVERY 6 HOURS PRN
Qty: 30 TABLET | Refills: 0 | Status: SHIPPED | OUTPATIENT
Start: 2022-06-22

## 2022-06-23 PROBLEM — Z98.890 S/P LAPAROSCOPY: Status: ACTIVE | Noted: 2022-06-23

## 2022-06-23 PROBLEM — O36.80X0 PREGNANCY OF UNKNOWN ANATOMIC LOCATION: Status: RESOLVED | Noted: 2022-06-10 | Resolved: 2022-06-23

## 2022-06-23 NOTE — PROGRESS NOTES
[facility] [address] [place] [address]    Assessment/Plan:  Kavon Clark is a 34 y o  [id number] now POD#8 s/p diagnostic laparoscopy and diagnostic D&C for pregnancy of unknown location which revealed right tubal ectopic pregnancy  At the time of laparoscopy she underwent bilateral salpingectomy for desired permanent sterilization  She returns today for postoperative visit  S/P laparoscopy  - Her incisions are clean, dry, and intact without erythema or induration  Her abdomen is softly distended and appropriately tender    - We reviewed the intraoperative course complicated by preperitoneal insufflation which has exacerbated her postoperative pain  We reviewed again intraoperative findings of right tubal ectopic pregnancy and endometriosis  - Pathology report is pending  Her HCG is < 2   - We discussed expectations for postoperative recovery, healing, and returning to work  She was out of work on June 9th for pain associated with ectopic pregnancy  Her postoperative course is complicated by pain related to preperitoneal CO2 insufflation at the time of laparoscopy  We discussed that it would be acceptable to take a total of 3 weeks of recovery if needed  Her expected return to work date would be July 5th  Diagnoses and all orders for this visit:    S/P laparoscopy  -     ibuprofen (MOTRIN) 600 mg tablet; Take 1 tablet (600 mg total) by mouth every 6 (six) hours as needed for mild pain    Right tubal pregnancy without intrauterine pregnancy          Subjective:   Kavon Clark is a 34 y o  [id number] female  CC: postoperative pain    HPI: Kavon Clark is a 34 y o  [id number] now POD#8 s/p diagnostic laparoscopy and diagnostic D&C for pregnancy of unknown location which revealed right tubal ectopic pregnancy  At the time of laparoscopy she underwent bilateral salpingectomy for desired permanent sterilization  She returns today for postoperative visit      Her surgery was complicated by preperitoneal insufflation at the time of laparoscopic entry and she has had increased pain postoperatively  She reports that her pain has been gradually getting better over the last 8 days but she is still having significant pain  She has been medicating around the clock with ibuprofen  Her pain is largely localized to the periumbilical area and lower abdomen and localizes to the abdominal wall  She is ambulating, voiding, passing gas, having bowel movements and tolerating a regular diet without nausea or vomiting  ROS: Review of Systems   Constitutional: Negative for chills and fever  Respiratory: Negative for cough and shortness of breath  Cardiovascular: Negative for chest pain and leg swelling  Gastrointestinal: Negative for abdominal pain, nausea and vomiting  Genitourinary: Negative for dysuria, frequency and urgency  Neurological: Negative for dizziness, light-headedness and headaches  PFSH: The following portions of the patient's history were reviewed and updated as appropriate: allergies, current medications, past family history, past medical history, obstetric history, gynecologic history, past social history, past surgical history and problem list        Objective:  /68   Ht 5' 2" (1 575 m)   Wt 71 7 kg (158 lb)   LMP 05/04/2022   BMI 28 90 kg/m²    Physical Exam  Constitutional:       Appearance: Normal appearance  HENT:      Head: Normocephalic and atraumatic  Mouth/Throat:      Mouth: Mucous membranes are moist       Pharynx: Oropharynx is clear  No oropharyngeal exudate  Cardiovascular:      Rate and Rhythm: Normal rate  Pulmonary:      Effort: Pulmonary effort is normal    Abdominal:      General: Abdomen is flat  There is no distension  Palpations: Abdomen is soft  There is no mass  Tenderness: There is no abdominal tenderness  Hernia: No hernia is present        Comments: Laparoscopy incisions are clean, dry, intact, and well healing  There is no erythema, induration, fluctuance, or drainage  Skin:     General: Skin is warm and dry  Neurological:      General: No focal deficit present  Mental Status: She is alert and oriented to person, place, and time     Psychiatric:         Mood and Affect: Mood normal          Behavior: Behavior normal              Muarisio Lutz MD  [address] [place] [address]  6/23/2022 9:19 AM

## 2022-06-23 NOTE — ASSESSMENT & PLAN NOTE
- Her incisions are clean, dry, and intact without erythema or induration  Her abdomen is softly distended and appropriately tender    - We reviewed the intraoperative course complicated by preperitoneal insufflation which has exacerbated her postoperative pain  We reviewed again intraoperative findings of right tubal ectopic pregnancy and endometriosis  - Pathology report is pending  Her HCG is < 2   - We discussed expectations for postoperative recovery, healing, and returning to work  She was out of work on June 9th for pain associated with ectopic pregnancy  Her postoperative course is complicated by pain related to preperitoneal CO2 insufflation at the time of laparoscopy  We discussed that it would be acceptable to take a total of 3 weeks of recovery if needed  Her expected return to work date would be July 5th

## 2022-07-01 ENCOUNTER — OFFICE VISIT (OUTPATIENT)
Dept: OBGYN CLINIC | Facility: MEDICAL CENTER | Age: 32
End: 2022-07-01

## 2022-07-01 VITALS
WEIGHT: 157 LBS | SYSTOLIC BLOOD PRESSURE: 120 MMHG | DIASTOLIC BLOOD PRESSURE: 70 MMHG | BODY MASS INDEX: 28.89 KG/M2 | HEIGHT: 62 IN

## 2022-07-01 DIAGNOSIS — Z98.890 S/P LAPAROSCOPY: Primary | ICD-10-CM

## 2022-07-01 PROCEDURE — 99024 POSTOP FOLLOW-UP VISIT: CPT | Performed by: STUDENT IN AN ORGANIZED HEALTH CARE EDUCATION/TRAINING PROGRAM

## 2022-07-01 NOTE — PROGRESS NOTES
[facility] [address] [place] [address]    Assessment/Plan:  Ruben Barfield is a 34 y o  [id number] who presents for routine postoperative visit after laparoscopic bilateral salpingectomy for ectopic pregnancy and desire for tubal sterilization  S/P laparoscopy  - Reviewed pathology report consistent with right tubal ectopic pregnancy  - Incisions are clean, dry, and intact and well healing  Her postop pain has resolved  - She may return to work on July 5 as planned  - Return for colposcopy as scheduled  Diagnoses and all orders for this visit:    S/P laparoscopy          Subjective:   Ruben Barfield is a 34 y o  [id number] female  CC: postop    HPI: Ruben Barfield is a 34 y o  [id number] now 2 weeks postop s/p diagnostic laparoscopy and diagnostic D&C for pregnancy of unknown location which revealed right tubal ectopic pregnancy  At the time of laparoscopy she underwent bilateral salpingectomy for desired permanent sterilization  She returns today for postoperative visit  Her surgery was complicated by preperitoneal insufflation at the time of laparoscopic entry and she has had increased pain postoperatively  Her pain has largely resolved now  She is ambulating, voiding, passing gas, having bowel movements and tolerating a regular diet without nausea or vomiting  ROS: Review of Systems   Constitutional: Negative for chills and fever  Respiratory: Negative for cough and shortness of breath  Cardiovascular: Negative for chest pain and leg swelling  Gastrointestinal: Negative for abdominal pain, nausea and vomiting  Genitourinary: Negative for dysuria, frequency and urgency  Neurological: Negative for dizziness, light-headedness and headaches         PFSH: The following portions of the patient's history were reviewed and updated as appropriate: allergies, current medications, past family history, past medical history, obstetric history, gynecologic history, past social history, past surgical history and problem list        Objective:  /70   Ht 5' 2" (1 575 m)   Wt 71 2 kg (157 lb)   LMP 05/04/2022   BMI 28 72 kg/m²    Physical Exam  Constitutional:       Appearance: Normal appearance  HENT:      Head: Normocephalic and atraumatic  Mouth/Throat:      Mouth: Mucous membranes are moist       Pharynx: Oropharynx is clear  No oropharyngeal exudate  Cardiovascular:      Rate and Rhythm: Normal rate  Pulmonary:      Effort: Pulmonary effort is normal    Abdominal:      General: Abdomen is flat  There is no distension  Palpations: Abdomen is soft  There is no mass  Tenderness: There is no abdominal tenderness  Hernia: No hernia is present  Comments: Incision is clean, dry, intact, and well healing  There is no erythema, induration, fluctuance, or drainage  Skin:     General: Skin is warm and dry  Neurological:      General: No focal deficit present  Mental Status: She is alert and oriented to person, place, and time     Psychiatric:         Mood and Affect: Mood normal          Behavior: Behavior normal            Beto Lilly MD  [address] [place] [address]  7/1/2022 9:58 AM

## 2022-07-01 NOTE — ASSESSMENT & PLAN NOTE
- Reviewed pathology report consistent with right tubal ectopic pregnancy  - Incisions are clean, dry, and intact and well healing  Her postop pain has resolved  - She may return to work on July 5 as planned  - Return for colposcopy as scheduled

## 2022-07-01 NOTE — PATIENT INSTRUCTIONS
Endometriosis  Endometriosis occurs when tissue that is similar to the lining of the uterus (endometrium) grows in other parts of the body and causes chronic inflammation that can cause scarring  It affects an estimated 5-10% of all women  It is most commonly found in the pelvic cavity and ovaries  Less commonly, these lesions may grow on the intestines and bladder, and rarely   in the lungs or other body locations  Growths of endometriosis are almost always benign (not cancerous)  Symptoms  The most common symptom is pain in the pelvis, lower abdomen, or lower back  Pain is most often during the menstrual cycle, but women may have pain at other   times  Not everyone with endometriosis has pain  Other symptoms include difficulty getting pregnant, pain during or after sex, pain with bowel movements or   urination, constipation, diarrhea and bloating (often around the menstrual cycle)  Main causes of Endometriosis  No one knows the exact cause  It is highly likely that certain genes play a role, but there are many other factors  Experts do agree the hormone estrogen promotes the growth of endometriosis and treatment often focuses on lowering estrogen levels  Diagnosis  The only way to diagnose endometriosis is with a surgical procedure called a laparoscopy to obtain a biopsy (sample of tissue)  Laparoscopy allows surgeons to see the disease   and take a biopsy that can be evaluated in a laboratory  Currently there are no blood tests or imaging tests that can make a sure endometriosis is present  However, if endometriosis is suspected based on symptoms, such as persistent painful periods and examination findings showing possible pelvic nodules, it is generally acceptable to start medical treatment without actually performing a laparoscopy  Usually surgery is recommended in patients who do not respond to medical treatment      Treatment  Hormone treatment like birth control pills rings, implants or IUDs, progestins, and antihormones called GnRH-analogues or GnRH-antagonists are used to decrease the amount of estrogen produced by the ovaries and fat cells, leading to decreased growth and shrinkage of the endometriosis  The hormonal treatments suppress menstrual flow and help to   stop new endometriosis implants from forming  The hormonal treatments are often successful for controlling pain (and bleeding) due to endometriosis  Pain medications such as Non-steroidal anti-inflammatories (NSAIDS) n are also helpful to provide relief when the symptoms are mild  Surgery is often the best choice for women with severe endometriosis or infertility who do not respond to medical treatment  Initial surgery is usually aimed at safely destroying as many of the endometriosis growths as possible  After surgery, continued suppression of menstruation is often recommended to reduce re-growth of endometriosis  In select cases a hysterectomy, (surgery to remove the uterus) can relieve symptoms  The ovaries may or may not be removed at the same time, depending on the severity of disease and the age of the patient  This surgery is typically done when a patient has failed other treatments and generally after childbearing has been complete  In women younger than   36, removal of the ovaries needs to be carefully considered and balanced against the risks of menopause  Some women with endometriosis have a more complicated type of pain  The pain is not just due to endometriosis implants but is also coming from other areas in the pelvis such as the pelvic muscles or other organs like the bladder or bowel  Dysfunction in these organs adds to chronic pain of endometriosis and can lead to pain with intercourse and to changes   in bowel movements and urination   Other pain conditions involving the gut and urinary bladder, such as irritable bowel syndrome (IBS) and bladder pain syndrome (interstitial cystitis) (IC), or fibromyalgia can co-exist with endometriosis and cause pain  Therefore, in some cases, patients can have pain that originates from multiple causes not just from endometriosis  The chronic pain experienced by patients with endometriosis can lead to changes in the brain and spinal cord (the Central Nervous System) which is the primary organ responsible for pain interpretation and control  Chronic pain can change the way pain is felt such that the pain increases or may spread to other parts of the body other than the pelvis  In addition, changes at the level of the brain may affect patients in other ways leading to mood changes such as depression and anxiety, interference with sleep, daily activities or sexual function  In complex cases of endometriosis where patients are affected by chronic pain and other distressing symptoms, hormonal and / or surgical treatments may not be enough  To address all symptoms, additional therapies such as physical therapy, dietary changes and multi-disciplinary care from specialists in bladder and bowel function may be needed  The brain /pain connection also important and may be addressed with meditation, yoga, acupuncture, and/or cognitive behavioral therapy (CBT) for pain  Medications that are used to treat nerve pain or mood such as anticonvulsants or antidepressants can also helpful for management of complicated endometriosis related chronic pain  For more information on endometriosis visit:  www womenshealth gov - Office of [employer], [place]  Department of Health and [employer]   www endometriosisassn  org - Endometriosis Association   www endometriosis  org -Global forum on endometriosis  www endocenter  org - Endometriosis [address]   www endometriosis  [place] - World Endometriosis Society [facility]

## 2022-08-05 ENCOUNTER — TELEPHONE (OUTPATIENT)
Dept: OBGYN CLINIC | Facility: MEDICAL CENTER | Age: 32
End: 2022-08-05

## 2022-08-26 DIAGNOSIS — R10.2 PELVIC PAIN: Primary | ICD-10-CM

## 2022-09-06 ENCOUNTER — HOSPITAL ENCOUNTER (OUTPATIENT)
Dept: RADIOLOGY | Facility: IMAGING CENTER | Age: 32
Discharge: HOME | End: 2022-09-06
Payer: COMMERCIAL

## 2022-09-06 DIAGNOSIS — R10.2 PELVIC PAIN: ICD-10-CM

## 2022-09-06 PROCEDURE — 76856 US EXAM PELVIC COMPLETE: CPT

## 2022-09-06 PROCEDURE — 76830 TRANSVAGINAL US NON-OB: CPT

## 2022-09-09 ENCOUNTER — OFFICE VISIT (OUTPATIENT)
Dept: OBGYN CLINIC | Facility: MEDICAL CENTER | Age: 32
End: 2022-09-09
Payer: COMMERCIAL

## 2022-09-09 VITALS
SYSTOLIC BLOOD PRESSURE: 120 MMHG | DIASTOLIC BLOOD PRESSURE: 68 MMHG | HEIGHT: 62 IN | BODY MASS INDEX: 30.44 KG/M2 | WEIGHT: 165.4 LBS

## 2022-09-09 DIAGNOSIS — N94.6 DYSMENORRHEA: ICD-10-CM

## 2022-09-09 DIAGNOSIS — R87.610 ATYPICAL SQUAMOUS CELLS OF UNDETERMINED SIGNIFICANCE ON CYTOLOGIC SMEAR OF CERVIX (ASC-US): Primary | ICD-10-CM

## 2022-09-09 DIAGNOSIS — R10.2 PELVIC PAIN: ICD-10-CM

## 2022-09-09 DIAGNOSIS — N80.9 ENDOMETRIOSIS DETERMINED BY LAPAROSCOPY: ICD-10-CM

## 2022-09-09 DIAGNOSIS — N80.129 ENDOMETRIOMA OF OVARY: ICD-10-CM

## 2022-09-09 PROBLEM — N80.1 ENDOMETRIOMA OF OVARY: Status: ACTIVE | Noted: 2022-09-09

## 2022-09-09 PROCEDURE — 88305 TISSUE EXAM BY PATHOLOGIST: CPT | Performed by: PATHOLOGY

## 2022-09-09 PROCEDURE — 57454 BX/CURETT OF CERVIX W/SCOPE: CPT | Performed by: STUDENT IN AN ORGANIZED HEALTH CARE EDUCATION/TRAINING PROGRAM

## 2022-09-09 PROCEDURE — 99214 OFFICE O/P EST MOD 30 MIN: CPT | Performed by: STUDENT IN AN ORGANIZED HEALTH CARE EDUCATION/TRAINING PROGRAM

## 2022-09-09 NOTE — ASSESSMENT & PLAN NOTE
- Reporting left sided pelvic pain, reviewed pelvic ultrasound in detail which showed 2 cm endometrioma  - Discussed suppression with norethindrone acetate and if still symptomatic consider left ovarian cystectomy

## 2022-09-09 NOTE — PROGRESS NOTES
[facility] [address] [place] [address]    Assessment/Plan:  Eran Tolliver is a 34 y o  [id number] who presents with ASCUS HPV OHR positive pap smear and persistent left sided pelvic pain and left sided ovarian endometrioma     Atypical squamous cells of undetermined significance on cytologic smear of cervix (ASC-US)  - Colposcopy performed today    Endometrioma of ovary  - Reporting left sided pelvic pain, reviewed pelvic ultrasound in detail which showed 2 cm endometrioma  - Discussed suppression with norethindrone acetate and if still symptomatic consider left ovarian cystectomy    Diagnoses and all orders for this visit:    Atypical squamous cells of undetermined significance on cytologic smear of cervix (ASC-US)  -     Tissue Exam    Endometrioma of ovary    Endometriosis determined by laparoscopy  -     norethindrone (Aygestin) 5 mg tablet; Take 1 tablet (5 mg total) by mouth daily  -     Tissue Exam    Pelvic pain  -     Tissue Exam    Dysmenorrhea  -     norethindrone (Aygestin) 5 mg tablet; Take 1 tablet (5 mg total) by mouth daily    Other orders  -     Colposcopy          Subjective:   Eran Tolliver is a 34 y o  [id number] female  CC: follow up    HPI: Pily Osborn presents for follow for pelvic pain  Additionally she had ASCUS OHR Pos pap and had missed her colposcopy a few weeks ago so this was done today  Her pelvic pain comes and goes, it is predominantly left sided  She has known endometriosis determined by laparoscopy  She has recently completed an ultrasound that showed a persistent 2 cm likely endometrioma  ROS: Review of Systems   Constitutional: Negative for chills and fever  Respiratory: Negative for cough and shortness of breath  Cardiovascular: Negative for chest pain and leg swelling  Gastrointestinal: Negative for abdominal pain, nausea and vomiting  Genitourinary: Negative for dysuria, frequency and urgency     Neurological: Negative for dizziness, light-headedness and headaches  PFSH: The following portions of the patient's history were reviewed and updated as appropriate: allergies, current medications, past family history, past medical history, obstetric history, gynecologic history, past social history, past surgical history and problem list        Objective:  /68   Ht 5' 2" (1 575 m)   Wt 75 kg (165 lb 6 4 oz)   LMP 05/04/2022 (LMP Unknown)   BMI 30 25 kg/m²    Physical Exam  Constitutional:       Appearance: Normal appearance  HENT:      Head: Normocephalic and atraumatic  Cardiovascular:      Rate and Rhythm: Normal rate  Pulmonary:      Effort: Pulmonary effort is normal    Abdominal:      General: There is no distension  Tenderness: There is no abdominal tenderness  There is no guarding  Genitourinary:     Exam position: Lithotomy position  Pubic Area: No rash  Labia:         Right: No rash, tenderness or lesion  Left: No rash, tenderness or lesion  Urethra: No prolapse, urethral swelling or urethral lesion  Vagina: No vaginal discharge, erythema, tenderness, bleeding or lesions  Cervix: No cervical motion tenderness, discharge, friability or erythema  Lymphadenopathy:      Lower Body: No right inguinal adenopathy  No left inguinal adenopathy  Neurological:      Mental Status: She is alert  Colposcopy     Date/Time 9/9/2022 4:35 PM     Universal Protocol   Consent: Written consent obtained  Risks and benefits: risks, benefits and alternatives were discussed  Consent given by: patient  Time out: Immediately prior to procedure a "time out" was called to verify the correct patient, procedure, equipment, support staff and site/side marked as required    Timeout called at: 9/9/2022 4:35 PM   Patient understanding: patient states understanding of the procedure being performed  Patient consent: the patient's understanding of the procedure matches consent given  Procedure consent: procedure consent matches procedure scheduled  Relevant documents: relevant documents present and verified  Test results: test results available and properly labeled  Required items: required blood products, implants, devices, and special equipment available  Patient identity confirmed: verbally with patient       Performed by  Chuck Wheatley MD     Authorized by Missy Wheatley MD        Pre-procedure details     Pre-procedure timeout performed: yes       Indication    ASC-US (HPV OHR positive)     Procedure Details   Procedure: Colposcopy w/ cervical biopsy and ECC      [facility] speculum was placed in the vagina: yes      Under colposcopic examination the transition zone was seen in entirety: yes      Intracervical block was performed: yes      Endocervix was curetted using a Kevorkian curette: yes      Cervical biopsy performed with a cervical biopsy punch: yes      Biopsy(s): yes      Location:  12 o'clock    Specimen to pathology: yes       Post-procedure      Impression: Low grade cervical dysplasia      Patient tolerance of procedure:   Tolerated well, no immediate complications       Beto Lilly MD  [address] [place] [address]  9/9/2022 4:37 PM

## 2022-09-19 ENCOUNTER — TELEPHONE (OUTPATIENT)
Dept: OBGYN CLINIC | Facility: MEDICAL CENTER | Age: 32
End: 2022-09-19

## 2022-09-19 NOTE — TELEPHONE ENCOUNTER
----- Message from Doreen Daniels RN sent at 9/15/2022 11:42 AM EDT -----  Regarding: FW: Scheduling surgery    ----- Message -----  From: Shae Shea  Sent: 9/15/2022   9:52 AM EDT  To: [facility]  Subject: Scheduling surgery                               Hi, I just wanted to follow up with you I have not heard from the  to schedule surgery and I need to inform my place of employment ahead of time     just wanted to check in to see if that was normal

## 2022-09-19 NOTE — TELEPHONE ENCOUNTER
"diagnostic laparoscopy with left ovarian cystectomy  "  Surgery date 11/17/22 H&P and Post-op scheduled  Pt is aware

## 2022-10-11 PROBLEM — Z13.220 LIPID SCREENING: Status: RESOLVED | Noted: 2022-05-06 | Resolved: 2022-10-11

## 2022-10-12 ENCOUNTER — PREP FOR PROCEDURE (OUTPATIENT)
Dept: OBGYN CLINIC | Facility: MEDICAL CENTER | Age: 32
End: 2022-10-12

## 2022-10-12 DIAGNOSIS — N80.129 ENDOMETRIOMA OF OVARY: Primary | ICD-10-CM

## 2022-10-12 PROBLEM — Z12.4 CERVICAL CANCER SCREENING: Status: RESOLVED | Noted: 2021-12-17 | Resolved: 2022-10-12

## 2022-10-12 PROBLEM — Z00.00 WELL ADULT EXAM: Status: RESOLVED | Noted: 2021-12-17 | Resolved: 2022-10-12

## 2022-10-12 RX ORDER — SODIUM CHLORIDE, SODIUM LACTATE, POTASSIUM CHLORIDE, CALCIUM CHLORIDE 600; 310; 30; 20 MG/100ML; MG/100ML; MG/100ML; MG/100ML
125 INJECTION, SOLUTION INTRAVENOUS CONTINUOUS
OUTPATIENT
Start: 2022-10-12

## 2022-11-07 ENCOUNTER — TELEPHONE (OUTPATIENT)
Dept: OBGYN CLINIC | Facility: MEDICAL CENTER | Age: 32
End: 2022-11-07

## 2022-11-08 ENCOUNTER — CONSULT (OUTPATIENT)
Dept: OBGYN CLINIC | Facility: CLINIC | Age: 32
End: 2022-11-08

## 2022-11-08 VITALS
BODY MASS INDEX: 31.8 KG/M2 | WEIGHT: 172.8 LBS | SYSTOLIC BLOOD PRESSURE: 120 MMHG | DIASTOLIC BLOOD PRESSURE: 70 MMHG | HEIGHT: 62 IN

## 2022-11-08 DIAGNOSIS — N80.129 ENDOMETRIOMA OF OVARY: Primary | ICD-10-CM

## 2022-11-08 DIAGNOSIS — N94.6 DYSMENORRHEA: ICD-10-CM

## 2022-11-08 NOTE — ASSESSMENT & PLAN NOTE
- Patient continues to report left sided pelvic pain and severe dysmenorrhea  She is scheduled for diagnostic laparoscopy, laparoscopic left ovarian cystectomy, and excision of endometriosis lesions   - She gave written informed consent today  A preoperative H&P was completed  - She has not yet started the norethindrone acetate for menstrual suppression  We additionally discussed the option of [place]  She inquires about the option of hysterectomy and we discussed the risks, benefits, and alternatives of this procedure in detail today  She may call the office to reschedule her procedure because she is now considering hysterectomy

## 2022-11-08 NOTE — PROGRESS NOTES
[facility] [address] [place] [address]    Assessment/Plan:  Андрей Rosen is a 34 y o  [id number] who presents for preoperative H&P prior to planned diagnostic laparoscopy, left ovarian cystectomy, excision of endometriosis lesions for dysmenorrhea, pelvic pain, and ovarian endometrioma  Endometrioma of ovary  - Patient continues to report left sided pelvic pain and severe dysmenorrhea  She is scheduled for diagnostic laparoscopy, laparoscopic left ovarian cystectomy, and excision of endometriosis lesions   - She gave written informed consent today  A preoperative H&P was completed  - She has not yet started the norethindrone acetate for menstrual suppression  We additionally discussed the option of [place]  She inquires about the option of hysterectomy and we discussed the risks, benefits, and alternatives of this procedure in detail today  She may call the office to reschedule her procedure because she is now considering hysterectomy  Diagnoses and all orders for this visit:    Endometrioma of ovary    Dysmenorrhea          Subjective:   Андрей Rosen is a 34 y o  [id number] female  CC: preop    HPI: Patito Osborne presents for preoperative H&P prior to planned diagnostic laparoscopy, left ovarian cystectomy, excision of endometriosis lesions for pelvic pain, dysmenorrhea, and known endometriosis  She continues to have severe dysmenorrhea and left sided pelvic pain  ROS: Review of Systems   Constitutional: Negative for chills and fever  Respiratory: Negative for cough and shortness of breath  Cardiovascular: Negative for chest pain and leg swelling  Gastrointestinal: Negative for abdominal pain, nausea and vomiting  Genitourinary: Negative for dysuria, frequency and urgency  Neurological: Negative for dizziness, light-headedness and headaches         PFSH: The following portions of the patient's history were reviewed and updated as appropriate: allergies, current medications, past family history, past medical history, obstetric history, gynecologic history, past social history, past surgical history and problem list        Objective:  /70   Ht 5' 2" (1 575 m)   Wt 78 4 kg (172 lb 12 8 oz)   LMP 10/29/2022 (Exact Date)   BMI 31 61 kg/m²    Physical Exam  Constitutional:       Appearance: Normal appearance  HENT:      Head: Normocephalic and atraumatic  Mouth/Throat:      Mouth: Mucous membranes are moist       Pharynx: Oropharynx is clear  No oropharyngeal exudate  Cardiovascular:      Rate and Rhythm: Normal rate and regular rhythm  Pulses: Normal pulses  Heart sounds: Normal heart sounds  No murmur heard  No friction rub  No gallop  Pulmonary:      Effort: Pulmonary effort is normal  No respiratory distress  Breath sounds: Normal breath sounds  No wheezing, rhonchi or rales  Abdominal:      General: Abdomen is flat  There is no distension  Palpations: Abdomen is soft  There is no mass  Tenderness: There is no abdominal tenderness  Hernia: No hernia is present  Musculoskeletal:         General: No deformity or signs of injury  Normal range of motion  Right lower leg: No edema  Left lower leg: No edema  Skin:     General: Skin is warm and dry  Neurological:      General: No focal deficit present  Mental Status: She is alert and oriented to person, place, and time     Psychiatric:         Mood and Affect: Mood normal          Behavior: Behavior normal            Nina Napoles MD  [address] [place] [address]  11/8/2022 1:35 PM

## 2022-12-07 ENCOUNTER — OFFICE VISIT (OUTPATIENT)
Dept: URGENT CARE | Age: 32
End: 2022-12-07

## 2022-12-07 VITALS
HEIGHT: 62 IN | WEIGHT: 173 LBS | HEART RATE: 84 BPM | TEMPERATURE: 97.4 F | BODY MASS INDEX: 31.83 KG/M2 | RESPIRATION RATE: 16 BRPM | OXYGEN SATURATION: 99 %

## 2022-12-07 DIAGNOSIS — J02.9 SORE THROAT: Primary | ICD-10-CM

## 2022-12-07 RX ORDER — IBUPROFEN 200 MG
TABLET ORAL EVERY 6 HOURS PRN
COMMUNITY

## 2022-12-07 RX ORDER — AMOXICILLIN AND CLAVULANATE POTASSIUM 875; 125 MG/1; MG/1
1 TABLET, FILM COATED ORAL EVERY 12 HOURS SCHEDULED
Qty: 14 TABLET | Refills: 0 | Status: SHIPPED | OUTPATIENT
Start: 2022-12-07 | End: 2022-12-14

## 2022-12-07 NOTE — PROGRESS NOTES
[facility]        NAME: Mignon Hernandez is a 34 y o  female  : 1990    MRN: [id number]  DATE: 2022  TIME: 12:51 PM    Assessment and Plan   Sore throat [J02 9]  1  Sore throat  amoxicillin-clavulanate (AUGMENTIN) 875-125 mg per tablet            Patient Instructions       Follow up with PCP in 3-5 days  Proceed to  ER if symptoms worsen  Chief Complaint     Chief Complaint   Patient presents with   • Cold Like Symptoms     X 1 5 wks    cough sorethroat chills         History of Present Illness       HPI  Patient presents today complaining of cold-like symptoms burning with cough sore throat chills ongoing for the past 1/2 weeks  Patient does not have any shortness breast discharge difficulty breathing  Patient denies any fevers    Review of Systems   Review of Systems  Per HPI    Current Medications       Current Outpatient Medications:   •  amoxicillin-clavulanate (AUGMENTIN) 875-125 mg per tablet, Take 1 tablet by mouth every 12 (twelve) hours for 7 days, Disp: 14 tablet, Rfl: 0  •  ibuprofen (MOTRIN) 200 mg tablet, Take by mouth every 6 (six) hours as needed for mild pain, Disp: , Rfl:   •  metoprolol succinate (TOPROL-XL) 25 mg 24 hr tablet, Take 1 tablet daily, Disp: 90 tablet, Rfl: 1  •  norethindrone (Aygestin) 5 mg tablet, Take 1 tablet (5 mg total) by mouth daily (Patient not taking: Reported on 2022), Disp: 90 tablet, Rfl: 3    Current Allergies     Allergies as of 2022   • (No Known Allergies)            The following portions of the patient's history were reviewed and updated as appropriate: allergies, current medications, past family history, past medical history, past social history, past surgical history and problem list      Past Medical History:   Diagnosis Date   • Anxiety    • Endometriosis        Past Surgical History:   Procedure Laterality Date   • DILATION AND CURETTAGE OF UTERUS     • LAPAROSCOPY     • FL DILATION/CURETTAGE,DIAGNOSTIC N/A 2022 Procedure: DILATATION AND CURETTAGE (D&C); Surgeon: Ivory Lynch MD;  Location: [facility];  Service: Gynecology   • ID LAP,DIAGNOSTIC ABDOMEN N/A 6/14/2022    Procedure: LAPAROSCOPY DIAGNOSTIC;  Surgeon: Ivory Lynch MD;  Location[facility];  Service: Gynecology   • ID LAP,RMV  ADNEXAL STRUCTURE N/A 6/14/2022    Procedure: BILATERAL SALPINGECTOMY, LAPAROSCOPIC;  Surgeon: Ivory Lynch MD;  Location[facility];  Service: Gynecology       Family History   Problem Relation Age of Onset   • Heart disease Mother    • Stroke Mother    • Graves' disease Mother    • No Known Problems Father    • No Known Problems Sister    • No Known Problems Brother    • No Known Problems Brother    • No Known Problems Daughter    • No Known Problems Daughter    • Heart disease Maternal Grandmother    • Diabetes Maternal Grandmother    • Heart disease Maternal Grandfather    • Diabetes Maternal Grandfather    • No Known Problems Paternal Grandmother    • No Known Problems Paternal Grandfather    • Breast cancer Neg Hx    • Colon cancer Neg Hx    • Ovarian cancer Neg Hx          Medications have been verified  Objective   Pulse 84   Temp (!) 97 4 °F (36 3 °C)   Resp 16   Ht 5' 2" (1 575 m)   Wt 78 5 kg (173 lb)   SpO2 99%   BMI 31 64 kg/m²   No LMP recorded  Physical Exam     Physical Exam  Constitutional:       General: She is not in acute distress  Appearance: Normal appearance  HENT:      Head: Normocephalic  Nose: No congestion or rhinorrhea  Mouth/Throat:      Mouth: Mucous membranes are moist       Pharynx: Posterior oropharyngeal erythema present  No oropharyngeal exudate  Eyes:      General:         Right eye: No discharge  Left eye: No discharge  Conjunctiva/sclera: Conjunctivae normal    Cardiovascular:      Rate and Rhythm: Normal rate and regular rhythm  Pulses: Normal pulses     Pulmonary:      Effort: Pulmonary effort is normal  No respiratory distress  Abdominal:      General: Abdomen is flat  There is no distension  Palpations: Abdomen is soft  Tenderness: There is no abdominal tenderness  Musculoskeletal:      Cervical back: Neck supple  Lymphadenopathy:      Cervical: Cervical adenopathy present  Skin:     General: Skin is warm  Capillary Refill: Capillary refill takes less than 2 seconds  Neurological:      Mental Status: She is alert and oriented to person, place, and time

## 2023-01-20 ENCOUNTER — CONSULT (OUTPATIENT)
Dept: OBGYN CLINIC | Facility: MEDICAL CENTER | Age: 33
End: 2023-01-20

## 2023-01-20 VITALS
HEIGHT: 62 IN | BODY MASS INDEX: 33.38 KG/M2 | DIASTOLIC BLOOD PRESSURE: 70 MMHG | WEIGHT: 181.4 LBS | SYSTOLIC BLOOD PRESSURE: 110 MMHG

## 2023-01-20 DIAGNOSIS — N94.6 DYSMENORRHEA: ICD-10-CM

## 2023-01-20 DIAGNOSIS — Z01.818 PREOPERATIVE EXAM FOR GYNECOLOGIC SURGERY: Primary | ICD-10-CM

## 2023-01-20 DIAGNOSIS — R10.2 PELVIC PAIN: ICD-10-CM

## 2023-01-20 DIAGNOSIS — N80.129 ENDOMETRIOMA OF OVARY: ICD-10-CM

## 2023-01-20 NOTE — PROGRESS NOTES
[facility] [address] [place] [address]    Assessment/Plan:  Yareli Carrillo is a 34 y o  [id number] who presents for preop for diagnostic laparoscopy, left ovarian cystectomy, and excision of endometrioma  She has requested total laparoscopic hysterectomy at the time of her surgery  Preoperative exam for gynecologic surgery  - Indication for procedure: Dysmenorrhea, pelvic pain, endometrioma of ovary  - Planned procedure: Total laparoscopic hysterectomy, left ovarian cystectomy, excision of endometriosis, cystoscopy, possible left oophorectomy  Given age and benefits of ovarian preservation I have recommended maintain ovaries in situ as long as they appear normal   - Preoperative testing: Will obtain preoperative testing as per procedure pass  Patient is in good health and no additional cardiovascular workup indicated  - I discussed with patient indication, risks, benefits and alternatives of surgery  We discussed possibility of bleeding requiring blood transfusion, life-threatening infections requiring additional procedures, injuries to surrounding organs such as bladder, ureters, gastrointestinal tract and or neurovascular structures  Additionally, we discussed general risks associated to stress of surgery such as venous thromboembolism, acute myocardial events and stroke  All questions answered to patient's satisfaction  She agrees and wants to proceed  Written informed consent obtained in the office today  Diagnoses and all orders for this visit:    Preoperative exam for gynecologic surgery    Endometrioma of ovary    Pelvic pain    Dysmenorrhea          Subjective:   Yareli Carrillo is a 34 y o  [id number] female  CC: preop    HPI: Kinza Monsalve presents for preop H&P prior to planned diagnostic laparoscopy, left ovarian cystectomy, excision of endometriosis, and possible oophorectomy for pelvic pain and left endometrioma    She continues to have left sided pelvic pain and severe dysmenorrhea  She requests hysterectomy for definitive surgical management of dysmenorrhea, having failed and not tolerated other therapies in the past       ROS: Review of Systems   Constitutional: Negative for chills and fever  Respiratory: Negative for cough and shortness of breath  Cardiovascular: Negative for chest pain and leg swelling  Gastrointestinal: Negative for abdominal pain, nausea and vomiting  Genitourinary: Negative for dysuria, frequency and urgency  Neurological: Negative for dizziness, light-headedness and headaches  PFSH: The following portions of the patient's history were reviewed and updated as appropriate: allergies, current medications, past family history, past medical history, obstetric history, gynecologic history, past social history, past surgical history and problem list        Objective:  /70   Ht 5' 2" (1 575 m)   Wt 82 3 kg (181 lb 6 4 oz)   LMP 12/20/2022 (Exact Date)   BMI 33 18 kg/m²    Physical Exam  Constitutional:       Appearance: Normal appearance  HENT:      Head: Normocephalic and atraumatic  Mouth/Throat:      Mouth: Mucous membranes are moist       Pharynx: Oropharynx is clear  No oropharyngeal exudate  Cardiovascular:      Rate and Rhythm: Normal rate and regular rhythm  Pulses: Normal pulses  Heart sounds: Normal heart sounds  No murmur heard  No friction rub  No gallop  Pulmonary:      Effort: Pulmonary effort is normal  No respiratory distress  Breath sounds: Normal breath sounds  No wheezing, rhonchi or rales  Abdominal:      General: Abdomen is flat  There is no distension  Palpations: Abdomen is soft  There is no mass  Tenderness: There is no abdominal tenderness  Hernia: No hernia is present  Musculoskeletal:         General: No deformity or signs of injury  Normal range of motion  Right lower leg: No edema  Left lower leg: No edema     Skin:     General: Skin is warm and dry    Neurological:      General: No focal deficit present  Mental Status: She is alert and oriented to person, place, and time     Psychiatric:         Mood and Affect: Mood normal          Behavior: Behavior normal            Shani Bland MD  [address] [place] [address]  1/20/2023 1:49 PM

## 2023-01-20 NOTE — ASSESSMENT & PLAN NOTE
- Indication for procedure: Dysmenorrhea, pelvic pain, endometrioma of ovary  - Planned procedure: Total laparoscopic hysterectomy, left ovarian cystectomy, excision of endometriosis, cystoscopy, possible left oophorectomy  Given age and benefits of ovarian preservation I have recommended maintain ovaries in situ as long as they appear normal   - Preoperative testing: Will obtain preoperative testing as per procedure pass  Patient is in good health and no additional cardiovascular workup indicated  - I discussed with patient indication, risks, benefits and alternatives of surgery  We discussed possibility of bleeding requiring blood transfusion, life-threatening infections requiring additional procedures, injuries to surrounding organs such as bladder, ureters, gastrointestinal tract and or neurovascular structures  Additionally, we discussed general risks associated to stress of surgery such as venous thromboembolism, acute myocardial events and stroke  All questions answered to patient's satisfaction  She agrees and wants to proceed  Written informed consent obtained in the office today

## 2023-01-23 ENCOUNTER — TELEPHONE (OUTPATIENT)
Dept: OBGYN CLINIC | Facility: MEDICAL CENTER | Age: 33
End: 2023-01-23

## 2023-01-23 NOTE — TELEPHONE ENCOUNTER
----- Message from Dewey Pruett MD sent at 1/20/2023  1:42 PM EST -----  Regarding: [facility] request  Procedure: Total laparoscopic hysterectomy, left ovarian cystectomy, excision of endometriosis, possible left oophorectomy, cystoscopy  Indication: Dysmenorrhea, Pelvic pain, Endometriosis  Location: [place]  Length 2 5 hrs    Cancel 2/16 laparoscopy  Preop H&P and consent done today (1/20/2023), has Carb drink and soap - if close to 30 days i'm ok updating H&P on date of surgery

## 2023-02-06 ENCOUNTER — TELEPHONE (OUTPATIENT)
Dept: OBGYN CLINIC | Facility: MEDICAL CENTER | Age: 33
End: 2023-02-06

## 2023-02-06 NOTE — TELEPHONE ENCOUNTER
Spoke with pt  Made her aware she is on a cancellation list  Please see her concerns below  Any recommendations ?

## 2023-02-06 NOTE — TELEPHONE ENCOUNTER
----- Message from Eileen Montoya sent at 2/6/2023 11:49 AM EST -----  Regarding: FW: Pain worsening, may 18th surgery date   Contact: [phone]    ----- Message -----  From: Karla Hughes  Sent: 2/6/2023  11:30 AM EST  To: [facility]  Subject: Pain worsening, may 18th surgery date            Hey, I just want to ensure I’m on a cancellation list  Last month through now I’m noticing more more pain  It seems to be getting worse I think  Over the weekend I could barely handle the pain I was having  I felt like I was close to fainting  I got very lightheaded and nauseous  Today started out with tolerable pain but is currently pretty painful to the point where I’m shaking and slightly lightheaded     Is there anything we can do other than being put on the cancellation list?

## 2023-04-05 ENCOUNTER — OFFICE VISIT (OUTPATIENT)
Dept: URGENT CARE | Age: 33
End: 2023-04-05

## 2023-04-05 VITALS — RESPIRATION RATE: 16 BRPM | OXYGEN SATURATION: 97 % | HEART RATE: 67 BPM | TEMPERATURE: 98.9 F

## 2023-04-05 DIAGNOSIS — H92.01 RIGHT EAR PAIN: Primary | ICD-10-CM

## 2023-04-05 RX ORDER — PREDNISONE 20 MG/1
20 TABLET ORAL 2 TIMES DAILY WITH MEALS
Qty: 10 TABLET | Refills: 0 | Status: SHIPPED | OUTPATIENT
Start: 2023-04-05 | End: 2023-04-10

## 2023-04-05 NOTE — PROGRESS NOTES
[facility]        NAME: Wally Armstrong is a 34 y o  female  : 1990    MRN: [id number]  DATE: 2023  TIME: 5:35 PM    Assessment and Plan   Right ear pain [H92 01]  1  Right ear pain  predniSONE 20 mg tablet    Ambulatory Referral to Otolaryngology      No signs of ear infection at this time  Please begin short course of prednisone every 12 hours x5 days  Please follow-up with ENT as directed  May continue conservative management such as decongestants or humidifier/over-the-counter nasal spray as needed  Patient Instructions   Earache   DISCHARGE INSTRUCTIONS:   Return to the emergency department if:   • You have a severe earache      • You have ear pain with itching, hearing loss, dizziness, a feeling of fullness in your ear, or ringing in your ears      Call your doctor if:   • Your ear pain worsens or does not go away with treatment      • You have drainage from your ear      • You have a fever      • Your outer ear becomes red, swollen, and warm      • You have questions or concerns about your condition or care      Medicines: You may need any of the following:  • Acetaminophen  decreases pain and fever  It is available without a doctor's order  Ask how much to take and how often to take it  Follow directions  Read the labels of all other medicines you are using to see if they also contain acetaminophen, or ask your doctor or pharmacist  Acetaminophen can cause liver damage if not taken correctly      • NSAIDs , such as ibuprofen, help decrease swelling, pain, and fever  This medicine is available with or without a doctor's order  NSAIDs can cause stomach bleeding or kidney problems in certain people  If you take blood thinner medicine, always ask your healthcare provider if NSAIDs are safe for you  Always read the medicine label and follow directions      • Do not give aspirin to children younger than 18 years    Your child could develop Reye syndrome if he or she has the flu or a fever and takes aspirin  Reye syndrome can cause life-threatening brain and liver damage  Check your child's medicine labels for aspirin or salicylates      • Take your medicine as directed  Contact your healthcare provider if you think your medicine is not helping or if you have side effects  Tell your provider if you are allergic to any medicine  Keep a list of the medicines, vitamins, and herbs you take  Include the amounts, and when and why you take them  Bring the list or the pill bottles to follow-up visits  Carry your medicine list with you in case of an emergency      Follow up with your doctor as directed:  Write down your questions so you remember to ask them during your visits  © Copyright Elizebeth Vanessa 2022 Information is for End User's use only and may not be sold, redistributed or otherwise used for commercial purposes  The above information is an  only  It is not intended as medical advice for individual conditions or treatments  Talk to your doctor, nurse or pharmacist before following any medical regimen to see if it is safe and effective for you        Follow up with PCP in 3-5 days  Proceed to  ER if symptoms worsen  Chief Complaint   No chief complaint on file  History of Present Illness       Earache   There is pain in the right ear  This is a new problem  The current episode started yesterday  The problem occurs every few minutes  The problem has been unchanged  There has been no fever  The pain is moderate  Pertinent negatives include no abdominal pain, coughing, diarrhea, ear discharge, headaches, hearing loss, neck pain, rash, rhinorrhea, sore throat or vomiting  Associated symptoms comments: nausea  She has tried nothing for the symptoms  The treatment provided no relief  There is no history of a chronic ear infection, hearing loss or a tympanostomy tube  Review of Systems   Review of Systems   Constitutional: Negative for fatigue and fever     HENT: Positive for ear pain  Negative for congestion, ear discharge, hearing loss, postnasal drip, rhinorrhea, sinus pressure, sinus pain, sneezing, sore throat and tinnitus  Eyes: Negative  Negative for pain, discharge, redness and itching  Respiratory: Negative  Negative for apnea, cough, choking, chest tightness, shortness of breath, wheezing and stridor  Cardiovascular: Negative  Negative for chest pain and palpitations  Gastrointestinal: Positive for nausea  Negative for abdominal pain, diarrhea and vomiting  Endocrine: Negative  Negative for polydipsia, polyphagia and polyuria  Genitourinary: Negative  Negative for decreased urine volume and flank pain  Musculoskeletal: Negative  Negative for arthralgias, back pain, myalgias, neck pain and neck stiffness  Skin: Negative  Negative for color change and rash  Allergic/Immunologic: Negative  Negative for environmental allergies  Neurological: Negative  Negative for dizziness, facial asymmetry, light-headedness, numbness and headaches  Hematological: Negative  Negative for adenopathy  Psychiatric/Behavioral: Negative            Current Medications       Current Outpatient Medications:   •  predniSONE 20 mg tablet, Take 1 tablet (20 mg total) by mouth 2 (two) times a day with meals for 5 days, Disp: 10 tablet, Rfl: 0  •  ibuprofen (MOTRIN) 200 mg tablet, Take by mouth every 6 (six) hours as needed for mild pain, Disp: , Rfl:   •  metoprolol succinate (TOPROL-XL) 25 mg 24 hr tablet, Take 1 tablet daily, Disp: 90 tablet, Rfl: 1  •  norethindrone (Aygestin) 5 mg tablet, Take 1 tablet (5 mg total) by mouth daily (Patient not taking: Reported on 11/8/2022), Disp: 90 tablet, Rfl: 3    Current Allergies     Allergies as of 04/05/2023   • (No Known Allergies)            The following portions of the patient's history were reviewed and updated as appropriate: allergies, current medications, past family history, past medical history, past social history, past surgical history and problem list      Past Medical History:   Diagnosis Date   • Anxiety    • Endometriosis        Past Surgical History:   Procedure Laterality Date   • DILATION AND CURETTAGE OF UTERUS     • LAPAROSCOPY     • VA DILATION/CURETTAGE,DIAGNOSTIC N/A 6/14/2022    Procedure: DILATATION AND CURETTAGE (D&C); Surgeon: Mara Jimenez MD;  Location: [facility];  Service: Gynecology   • VA LAP,DIAGNOSTIC ABDOMEN N/A 6/14/2022    Procedure: LAPAROSCOPY DIAGNOSTIC;  Surgeon: Mara Jimenez MD;  Location: [facility];  Service: Gynecology   • VA LAP,RMV  ADNEXAL STRUCTURE N/A 6/14/2022    Procedure: BILATERAL SALPINGECTOMY, LAPAROSCOPIC;  Surgeon: Mara Jimenez MD;  Location: [facility];  Service: Gynecology       Family History   Problem Relation Age of Onset   • Heart disease Mother    • Stroke Mother    • Graves' disease Mother    • No Known Problems Father    • No Known Problems Sister    • No Known Problems Brother    • No Known Problems Brother    • No Known Problems Daughter    • No Known Problems Daughter    • Heart disease Maternal Grandmother    • Diabetes Maternal Grandmother    • Heart disease Maternal Grandfather    • Diabetes Maternal Grandfather    • No Known Problems Paternal Grandmother    • No Known Problems Paternal Grandfather    • Breast cancer Neg Hx    • Colon cancer Neg Hx    • Ovarian cancer Neg Hx          Medications have been verified  Objective   There were no vitals taken for this visit  Physical Exam     Physical Exam  Vitals and nursing note reviewed  Constitutional:       General: She is not in acute distress  Appearance: Normal appearance  She is not ill-appearing, toxic-appearing or diaphoretic  Interventions: She is not intubated  HENT:      Head: Normocephalic and atraumatic  Jaw: There is normal jaw occlusion  No trismus, tenderness, swelling, pain on movement or malocclusion        Right Ear: Hearing, tympanic membrane, ear canal and external ear normal  There is no impacted cerumen  Tympanic membrane is not erythematous, retracted or bulging  Left Ear: Hearing, tympanic membrane, ear canal and external ear normal  There is no impacted cerumen  Tympanic membrane is not erythematous or bulging  Nose: Nose normal  No congestion or rhinorrhea  Mouth/Throat:      Mouth: Mucous membranes are moist       Pharynx: No oropharyngeal exudate or posterior oropharyngeal erythema  Eyes:      Extraocular Movements: Extraocular movements intact  Conjunctiva/sclera: Conjunctivae normal       Pupils: Pupils are equal, round, and reactive to light  Cardiovascular:      Rate and Rhythm: Normal rate and regular rhythm  Pulses: Normal pulses  Heart sounds: Normal heart sounds, S1 normal and S2 normal  Heart sounds not distant  No murmur heard  No friction rub  No gallop  Pulmonary:      Effort: Pulmonary effort is normal  No tachypnea, bradypnea, accessory muscle usage, prolonged expiration, respiratory distress or retractions  She is not intubated  Breath sounds: Normal breath sounds  No stridor, decreased air movement or transmitted upper airway sounds  No decreased breath sounds, wheezing, rhonchi or rales  Abdominal:      General: Bowel sounds are normal       Palpations: Abdomen is soft  Tenderness: There is no abdominal tenderness  There is no guarding or rebound  Musculoskeletal:         General: Normal range of motion  Cervical back: Full passive range of motion without pain, normal range of motion and neck supple  No rigidity or tenderness  No spinous process tenderness or muscular tenderness  Normal range of motion  Lymphadenopathy:      Cervical: No cervical adenopathy  Right cervical: No superficial cervical adenopathy  Left cervical: No superficial cervical adenopathy  Skin:     General: Skin is warm and dry        Capillary Refill: Capillary refill takes less than 2 seconds  Neurological:      General: No focal deficit present  Mental Status: She is alert and oriented to person, place, and time  Cranial Nerves: No cranial nerve deficit     Psychiatric:         Mood and Affect: Mood normal          Behavior: Behavior normal

## 2023-04-05 NOTE — PATIENT INSTRUCTIONS
No signs of ear infection at this time  Please begin short course of prednisone every 12 hours x5 days  Please follow-up with ENT as directed  May continue conservative management such as decongestants or humidifier/over-the-counter nasal spray as needed

## 2023-05-01 ENCOUNTER — CONSULT (OUTPATIENT)
Dept: OBGYN CLINIC | Facility: CLINIC | Age: 33
End: 2023-05-01

## 2023-05-01 VITALS
SYSTOLIC BLOOD PRESSURE: 118 MMHG | DIASTOLIC BLOOD PRESSURE: 68 MMHG | WEIGHT: 192 LBS | HEIGHT: 62 IN | BODY MASS INDEX: 35.33 KG/M2

## 2023-05-01 DIAGNOSIS — Z01.818 PREOPERATIVE EXAM FOR GYNECOLOGIC SURGERY: Primary | ICD-10-CM

## 2023-05-01 NOTE — PROGRESS NOTES
[facility] [address], [place]    Assessment/Plan:  Prince Mistry is a 28 y o  [id number] who presents for preop H&P prior to planned total laparoscopic hysterectomy, left ovarian cystectomy, excision of endometriosis, cystoscopy, and possible left oophorectomy  Preoperative exam for gynecologic surgery  - Indication for procedure: Dysmenorrhea, pelvic pain, endometrioma of ovary  - Planned procedure: Total laparoscopic hysterectomy, left ovarian cystectomy, excision of endometriosis, cystoscopy, possible left oophorectomy  Given age and benefits of ovarian preservation I have recommended maintain ovaries in situ as long as they appear normal   - Preoperative testing: Will obtain preoperative testing as per procedure pass  Patient is in good health and no additional cardiovascular workup indicated  - I discussed with patient indication, risks, benefits and alternatives of surgery  We discussed possibility of bleeding requiring blood transfusion, life-threatening infections requiring additional procedures, injuries to surrounding organs such as bladder, ureters, gastrointestinal tract and or neurovascular structures  Additionally, we discussed general risks associated to stress of surgery such as venous thromboembolism, acute myocardial events and stroke  All questions answered to patient's satisfaction  She agrees and wants to proceed  Written informed consent obtained in the office today  Diagnoses and all orders for this visit:    Preoperative exam for gynecologic surgery          Subjective:   Prince Mistry is a 28 y o  [id number] female  CC: preop    HPI: Yolanda Nicole presents for preop H&P prior to planned TLH  She denies changes to her health  ROS: Review of Systems   Constitutional: Negative for chills and fever  Respiratory: Negative for cough and shortness of breath  Cardiovascular: Negative for chest pain and leg swelling     Gastrointestinal: Negative "for abdominal pain, nausea and vomiting  Genitourinary: Negative for dysuria, frequency and urgency  Neurological: Negative for dizziness, light-headedness and headaches  PFSH: The following portions of the patient's history were reviewed and updated as appropriate: allergies, current medications, past family history, past medical history, obstetric history, gynecologic history, past social history, past surgical history and problem list        Objective:  /68   Ht 5' 2\" (1 575 m)   Wt 87 1 kg (192 lb)   LMP 04/12/2023 (Exact Date)   BMI 35 12 kg/m²    Physical Exam  Constitutional:       Appearance: Normal appearance  HENT:      Head: Normocephalic and atraumatic  Mouth/Throat:      Mouth: Mucous membranes are moist       Pharynx: Oropharynx is clear  No oropharyngeal exudate  Cardiovascular:      Rate and Rhythm: Normal rate and regular rhythm  Pulses: Normal pulses  Heart sounds: Normal heart sounds  No murmur heard  No friction rub  No gallop  Pulmonary:      Effort: Pulmonary effort is normal  No respiratory distress  Breath sounds: Normal breath sounds  No wheezing, rhonchi or rales  Abdominal:      General: Abdomen is flat  There is no distension  Palpations: Abdomen is soft  There is no mass  Tenderness: There is no abdominal tenderness  Hernia: No hernia is present  Musculoskeletal:         General: No deformity or signs of injury  Normal range of motion  Right lower leg: No edema  Left lower leg: No edema  Skin:     General: Skin is warm and dry  Neurological:      General: No focal deficit present  Mental Status: She is alert and oriented to person, place, and time     Psychiatric:         Mood and Affect: Mood normal          Behavior: Behavior normal            Francheska Ruiz MD  [address] [place] [address]  5/1/2023 4:32 PM    "

## 2023-05-01 NOTE — H&P (VIEW-ONLY)
[facility] [address] [place] [address]    Assessment/Plan:  Carlos Gan is a 28 y o  [id number] who presents for preop H&P prior to planned total laparoscopic hysterectomy, left ovarian cystectomy, excision of endometriosis, cystoscopy, and possible left oophorectomy  Preoperative exam for gynecologic surgery  - Indication for procedure: Dysmenorrhea, pelvic pain, endometrioma of ovary  - Planned procedure: Total laparoscopic hysterectomy, left ovarian cystectomy, excision of endometriosis, cystoscopy, possible left oophorectomy  Given age and benefits of ovarian preservation I have recommended maintain ovaries in situ as long as they appear normal   - Preoperative testing: Will obtain preoperative testing as per procedure pass  Patient is in good health and no additional cardiovascular workup indicated  - I discussed with patient indication, risks, benefits and alternatives of surgery  We discussed possibility of bleeding requiring blood transfusion, life-threatening infections requiring additional procedures, injuries to surrounding organs such as bladder, ureters, gastrointestinal tract and or neurovascular structures  Additionally, we discussed general risks associated to stress of surgery such as venous thromboembolism, acute myocardial events and stroke  All questions answered to patient's satisfaction  She agrees and wants to proceed  Written informed consent obtained in the office today  Diagnoses and all orders for this visit:    Preoperative exam for gynecologic surgery          Subjective:   Carlos Gan is a 28 y o  [id number] female  CC: preop    HPI: Yumiko presents for preop H&P prior to planned TLH  She denies changes to her health  ROS: Review of Systems   Constitutional: Negative for chills and fever  Respiratory: Negative for cough and shortness of breath  Cardiovascular: Negative for chest pain and leg swelling     Gastrointestinal: Negative "for abdominal pain, nausea and vomiting  Genitourinary: Negative for dysuria, frequency and urgency  Neurological: Negative for dizziness, light-headedness and headaches  PFSH: The following portions of the patient's history were reviewed and updated as appropriate: allergies, current medications, past family history, past medical history, obstetric history, gynecologic history, past social history, past surgical history and problem list        Objective:  /68   Ht 5' 2\" (1 575 m)   Wt 87 1 kg (192 lb)   LMP 04/12/2023 (Exact Date)   BMI 35 12 kg/m²    Physical Exam  Constitutional:       Appearance: Normal appearance  HENT:      Head: Normocephalic and atraumatic  Mouth/Throat:      Mouth: Mucous membranes are moist       Pharynx: Oropharynx is clear  No oropharyngeal exudate  Cardiovascular:      Rate and Rhythm: Normal rate and regular rhythm  Pulses: Normal pulses  Heart sounds: Normal heart sounds  No murmur heard  No friction rub  No gallop  Pulmonary:      Effort: Pulmonary effort is normal  No respiratory distress  Breath sounds: Normal breath sounds  No wheezing, rhonchi or rales  Abdominal:      General: Abdomen is flat  There is no distension  Palpations: Abdomen is soft  There is no mass  Tenderness: There is no abdominal tenderness  Hernia: No hernia is present  Musculoskeletal:         General: No deformity or signs of injury  Normal range of motion  Right lower leg: No edema  Left lower leg: No edema  Skin:     General: Skin is warm and dry  Neurological:      General: No focal deficit present  Mental Status: She is alert and oriented to person, place, and time     Psychiatric:         Mood and Affect: Mood normal          Behavior: Behavior normal            David Linda MD  [address] [place] [address]  5/1/2023 4:32 PM    "

## 2023-05-04 ENCOUNTER — TELEPHONE (OUTPATIENT)
Dept: FAMILY MEDICINE CLINIC | Facility: CLINIC | Age: 33
End: 2023-05-04

## 2023-05-05 ENCOUNTER — TELEPHONE (OUTPATIENT)
Dept: OBGYN CLINIC | Facility: MEDICAL CENTER | Age: 33
End: 2023-05-05

## 2023-05-05 ENCOUNTER — ANESTHESIA EVENT (OUTPATIENT)
Dept: PERIOP | Facility: HOSPITAL | Age: 33
End: 2023-05-05

## 2023-05-08 ENCOUNTER — HOSPITAL ENCOUNTER (OUTPATIENT)
Dept: NON INVASIVE DIAGNOSTICS | Facility: CLINIC | Age: 33
Discharge: HOME | End: 2023-05-08

## 2023-05-08 VITALS
SYSTOLIC BLOOD PRESSURE: 118 MMHG | HEIGHT: 62 IN | WEIGHT: 192 LBS | BODY MASS INDEX: 35.33 KG/M2 | HEART RATE: 67 BPM | DIASTOLIC BLOOD PRESSURE: 68 MMHG

## 2023-05-08 LAB
AORTIC ROOT: 3 CM
APICAL FOUR CHAMBER EJECTION FRACTION: 63 %
ASCENDING AORTA: 2.8 CM
AV LVOT MEAN GRADIENT: 2 MMHG
AV LVOT PEAK GRADIENT: 3 MMHG
AV PEAK GRADIENT: 8 MMHG
DOP CALC LVOT PEAK VEL VTI: 20.72 CM
DOP CALC LVOT PEAK VEL: 0.88 M/S
E WAVE DECELERATION TIME: 117 MS
E/A RATIO: 1.94
FRACTIONAL SHORTENING: 32 (ref 28–44)
INTERVENTRICULAR SEPTUM IN DIASTOLE (PARASTERNAL SHORT AXIS VIEW): 0.9 CM
INTERVENTRICULAR SEPTUM: 0.9 CM (ref 0.6–1.1)
LAAS-AP2: 15.8 CM2
LAAS-AP4: 19.2 CM2
LEFT ATRIUM AREA SYSTOLE SINGLE PLANE A4C: 20.1 CM2
LEFT ATRIUM SIZE: 4.2 CM
LEFT ATRIUM VOLUME INDEX (MOD BIPLANE): 26.1
LEFT INTERNAL DIMENSION IN SYSTOLE: 3.2 CM (ref 2.1–4)
LEFT VENTRICULAR INTERNAL DIMENSION IN DIASTOLE: 4.7 CM (ref 3.5–6)
LEFT VENTRICULAR POSTERIOR WALL IN END DIASTOLE: 0.8 CM
LEFT VENTRICULAR STROKE VOLUME: 63 ML
LVSV (TEICH): 63 ML
MV E'TISSUE VEL-SEP: 16 CM/S
MV PEAK A VEL: 0.51 M/S
MV PEAK E VEL: 99 CM/S
MV STENOSIS PRESSURE HALF TIME: 34 MS
MV VALVE AREA P 1/2 METHOD: 6.5
RIGHT ATRIAL 2D VOLUME: 26 ML
RIGHT ATRIUM AREA SYSTOLE A4C: 11.9 CM2
RIGHT VENTRICLE ID DIMENSION: 3.3 CM
SL CV LEFT ATRIUM LENGTH A2C: 4.9 CM
SL CV LV EF: 60
SL CV PED ECHO LEFT VENTRICLE DIASTOLIC VOLUME (MOD BIPLANE) 2D: 104 ML
SL CV PED ECHO LEFT VENTRICLE SYSTOLIC VOLUME (MOD BIPLANE) 2D: 41 ML
TR MAX PG: 19 MMHG
TR PEAK VELOCITY: 2.2 M/S
TRICUSPID ANNULAR PLANE SYSTOLIC EXCURSION: 2.6 CM
TRICUSPID VALVE PEAK REGURGITATION VELOCITY: 2.18 M/S

## 2023-05-15 RX ORDER — ACETAMINOPHEN 325 MG/1
650 TABLET ORAL EVERY 4 HOURS PRN
COMMUNITY

## 2023-05-15 NOTE — PRE-PROCEDURE INSTRUCTIONS
Pre-Surgery Instructions:   Medication Instructions   • acetaminophen (TYLENOL) 325 mg tablet Uses PRN- OK to take day of surgery   • metoprolol succinate (TOPROL-XL) 25 mg 24 hr tablet Take day of surgery  Medication instructions for day surgery reviewed  Please use only a sip of water to take your instructed medications  Avoid all over the counter vitamins, supplements and NSAIDS for one week prior to surgery per anesthesia guidelines  Tylenol is ok to take as needed  You will receive a call one business day prior to surgery with an arrival time and hospital directions  If your surgery is scheduled on a Monday, the hospital will be calling you on the Friday prior to your surgery  If you have not heard from anyone by 8pm, please call the hospital supervisor through the hospital  at [phone]  Zack Whiteside [phone])  Do not eat or drink anything after midnight the night before your surgery, including candy, mints, lifesavers, or chewing gum  Do not drink alcohol 24hrs before your surgery  Try not to smoke at least 24hrs before your surgery  Follow the pre surgery showering instructions as listed in the [facility] Surgical Experience Booklet” or otherwise provided by your surgeon's office  Do not shave the surgical area 24 hours before surgery  Do not apply any lotions, creams, including makeup, cologne, deodorant, or perfumes after showering on the day of your surgery  No contact lenses, eye make-up, or artificial eyelashes  Remove nail polish, including gel polish, and any artificial, gel, or acrylic nails if possible  Remove all jewelry including rings and body piercing jewelry  Wear causal clothing that is easy to take on and off  Consider your type of surgery  Keep any valuables, jewelry, piercings at home  Please bring any specially ordered equipment (sling, braces) if indicated  Arrange for a responsible person to drive you to and from the hospital on the day of your surgery  Visitor Guidelines discussed  Call the surgeon's office with any new illnesses, exposures, or additional questions prior to surgery  Please reference your [facility] Surgical Experience Booklet” for additional information to prepare for your upcoming surgery

## 2023-05-18 ENCOUNTER — HOSPITAL ENCOUNTER (OUTPATIENT)
Facility: HOSPITAL | Age: 33
Setting detail: OUTPATIENT SURGERY
Discharge: HOME | End: 2023-05-19
Attending: STUDENT IN AN ORGANIZED HEALTH CARE EDUCATION/TRAINING PROGRAM | Admitting: STUDENT IN AN ORGANIZED HEALTH CARE EDUCATION/TRAINING PROGRAM

## 2023-05-18 ENCOUNTER — ANESTHESIA (OUTPATIENT)
Dept: PERIOP | Facility: HOSPITAL | Age: 33
End: 2023-05-18

## 2023-05-18 DIAGNOSIS — Z90.710 S/P HYSTERECTOMY: Primary | ICD-10-CM

## 2023-05-18 DIAGNOSIS — N93.9 ABNORMAL UTERINE BLEEDING: ICD-10-CM

## 2023-05-18 DIAGNOSIS — R11.2 PONV (POSTOPERATIVE NAUSEA AND VOMITING): ICD-10-CM

## 2023-05-18 DIAGNOSIS — R10.2 PELVIC PAIN: ICD-10-CM

## 2023-05-18 DIAGNOSIS — Z98.890 PONV (POSTOPERATIVE NAUSEA AND VOMITING): ICD-10-CM

## 2023-05-18 DIAGNOSIS — N80.9 ENDOMETRIOSIS: ICD-10-CM

## 2023-05-18 LAB
ABO GROUP BLD: NORMAL
BASOPHILS # BLD AUTO: 0.04 THOUSANDS/ÂΜL (ref 0–0.1)
BASOPHILS NFR BLD AUTO: 1 % (ref 0–1)
BLD GP AB SCN SERPL QL: NEGATIVE
EOSINOPHIL # BLD AUTO: 0.12 THOUSAND/ÂΜL (ref 0–0.61)
EOSINOPHIL NFR BLD AUTO: 2 % (ref 0–6)
ERYTHROCYTE [DISTWIDTH] IN BLOOD BY AUTOMATED COUNT: 12.2 % (ref 11.6–15.1)
EXT PREGNANCY TEST URINE: NEGATIVE
EXT. CONTROL: NORMAL
HCT VFR BLD AUTO: 37.5 % (ref 34.8–46.1)
HGB BLD-MCNC: 12.8 G/DL (ref 11.5–15.4)
IMM GRANULOCYTES # BLD AUTO: 0.02 THOUSAND/UL (ref 0–0.2)
IMM GRANULOCYTES NFR BLD AUTO: 0 % (ref 0–2)
LYMPHOCYTES # BLD AUTO: 1.83 THOUSANDS/ÂΜL (ref 0.6–4.47)
LYMPHOCYTES NFR BLD AUTO: 27 % (ref 14–44)
MCH RBC QN AUTO: 32.9 PG (ref 26.8–34.3)
MCHC RBC AUTO-ENTMCNC: 34.1 G/DL (ref 31.4–37.4)
MCV RBC AUTO: 96 FL (ref 82–98)
MONOCYTES # BLD AUTO: 0.57 THOUSAND/ÂΜL (ref 0.17–1.22)
MONOCYTES NFR BLD AUTO: 8 % (ref 4–12)
NEUTROPHILS # BLD AUTO: 4.32 THOUSANDS/ÂΜL (ref 1.85–7.62)
NEUTS SEG NFR BLD AUTO: 62 % (ref 43–75)
NRBC BLD AUTO-RTO: 0 /100 WBCS
PLATELET # BLD AUTO: 326 THOUSANDS/UL (ref 149–390)
PMV BLD AUTO: 8.6 FL (ref 8.9–12.7)
RBC # BLD AUTO: 3.89 MILLION/UL (ref 3.81–5.12)
RH BLD: POSITIVE
SPECIMEN EXPIRATION DATE: NORMAL
WBC # BLD AUTO: 6.9 THOUSAND/UL (ref 4.31–10.16)

## 2023-05-18 RX ORDER — KETOROLAC TROMETHAMINE 30 MG/ML
INJECTION, SOLUTION INTRAMUSCULAR; INTRAVENOUS AS NEEDED
Status: DISCONTINUED | OUTPATIENT
Start: 2023-05-18 | End: 2023-05-18

## 2023-05-18 RX ORDER — BUPIVACAINE HYDROCHLORIDE 2.5 MG/ML
INJECTION, SOLUTION EPIDURAL; INFILTRATION; INTRACAUDAL AS NEEDED
Status: DISCONTINUED | OUTPATIENT
Start: 2023-05-18 | End: 2023-05-18 | Stop reason: HOSPADM

## 2023-05-18 RX ORDER — CEFAZOLIN SODIUM 2 G/50ML
2000 SOLUTION INTRAVENOUS ONCE
Status: COMPLETED | OUTPATIENT
Start: 2023-05-18 | End: 2023-05-18

## 2023-05-18 RX ORDER — SCOLOPAMINE TRANSDERMAL SYSTEM 1 MG/1
1 PATCH, EXTENDED RELEASE TRANSDERMAL ONCE
Status: DISCONTINUED | OUTPATIENT
Start: 2023-05-18 | End: 2023-05-18

## 2023-05-18 RX ORDER — IBUPROFEN 600 MG/1
600 TABLET ORAL EVERY 6 HOURS SCHEDULED
Status: DISCONTINUED | OUTPATIENT
Start: 2023-05-19 | End: 2023-05-19

## 2023-05-18 RX ORDER — FENTANYL CITRATE/PF 50 MCG/ML
25 SYRINGE (ML) INJECTION
Status: COMPLETED | OUTPATIENT
Start: 2023-05-18 | End: 2023-05-18

## 2023-05-18 RX ORDER — SCOLOPAMINE TRANSDERMAL SYSTEM 1 MG/1
1 PATCH, EXTENDED RELEASE TRANSDERMAL
Status: DISCONTINUED | OUTPATIENT
Start: 2023-05-18 | End: 2023-05-19 | Stop reason: HOSPADM

## 2023-05-18 RX ORDER — IBUPROFEN 200 MG
600 TABLET ORAL EVERY 6 HOURS PRN
Qty: 90 TABLET | Refills: 2 | Status: SHIPPED | OUTPATIENT
Start: 2023-05-18 | End: 2023-06-17

## 2023-05-18 RX ORDER — MAGNESIUM HYDROXIDE 1200 MG/15ML
LIQUID ORAL AS NEEDED
Status: DISCONTINUED | OUTPATIENT
Start: 2023-05-18 | End: 2023-05-18 | Stop reason: HOSPADM

## 2023-05-18 RX ORDER — FENTANYL CITRATE 50 UG/ML
INJECTION, SOLUTION INTRAMUSCULAR; INTRAVENOUS AS NEEDED
Status: DISCONTINUED | OUTPATIENT
Start: 2023-05-18 | End: 2023-05-18

## 2023-05-18 RX ORDER — ACETAMINOPHEN 325 MG/1
650 TABLET ORAL EVERY 6 HOURS SCHEDULED
Status: DISCONTINUED | OUTPATIENT
Start: 2023-05-19 | End: 2023-05-19 | Stop reason: HOSPADM

## 2023-05-18 RX ORDER — ROCURONIUM BROMIDE 10 MG/ML
INJECTION, SOLUTION INTRAVENOUS AS NEEDED
Status: DISCONTINUED | OUTPATIENT
Start: 2023-05-18 | End: 2023-05-18

## 2023-05-18 RX ORDER — DEXAMETHASONE SODIUM PHOSPHATE 10 MG/ML
INJECTION, SOLUTION INTRAMUSCULAR; INTRAVENOUS AS NEEDED
Status: DISCONTINUED | OUTPATIENT
Start: 2023-05-18 | End: 2023-05-18

## 2023-05-18 RX ORDER — OXYCODONE HYDROCHLORIDE 5 MG/1
5 TABLET ORAL EVERY 4 HOURS PRN
Status: DISCONTINUED | OUTPATIENT
Start: 2023-05-18 | End: 2023-05-18 | Stop reason: SDUPTHER

## 2023-05-18 RX ORDER — IBUPROFEN 600 MG/1
600 TABLET ORAL EVERY 6 HOURS PRN
Status: DISCONTINUED | OUTPATIENT
Start: 2023-05-18 | End: 2023-05-18 | Stop reason: SDUPTHER

## 2023-05-18 RX ORDER — ACETAMINOPHEN 325 MG/1
975 TABLET ORAL EVERY 6 HOURS PRN
Status: DISCONTINUED | OUTPATIENT
Start: 2023-05-18 | End: 2023-05-19 | Stop reason: HOSPADM

## 2023-05-18 RX ORDER — LIDOCAINE HYDROCHLORIDE 10 MG/ML
INJECTION, SOLUTION EPIDURAL; INFILTRATION; INTRACAUDAL; PERINEURAL AS NEEDED
Status: DISCONTINUED | OUTPATIENT
Start: 2023-05-18 | End: 2023-05-18

## 2023-05-18 RX ORDER — OXYCODONE HYDROCHLORIDE 10 MG/1
10 TABLET ORAL EVERY 4 HOURS PRN
Status: DISCONTINUED | OUTPATIENT
Start: 2023-05-18 | End: 2023-05-18

## 2023-05-18 RX ORDER — MIDAZOLAM HYDROCHLORIDE 2 MG/2ML
INJECTION, SOLUTION INTRAMUSCULAR; INTRAVENOUS AS NEEDED
Status: DISCONTINUED | OUTPATIENT
Start: 2023-05-18 | End: 2023-05-18

## 2023-05-18 RX ORDER — ONDANSETRON 2 MG/ML
4 INJECTION INTRAMUSCULAR; INTRAVENOUS EVERY 6 HOURS PRN
Status: DISCONTINUED | OUTPATIENT
Start: 2023-05-18 | End: 2023-05-19 | Stop reason: HOSPADM

## 2023-05-18 RX ORDER — PROPOFOL 10 MG/ML
INJECTION, EMULSION INTRAVENOUS AS NEEDED
Status: DISCONTINUED | OUTPATIENT
Start: 2023-05-18 | End: 2023-05-18

## 2023-05-18 RX ORDER — OXYCODONE HYDROCHLORIDE 5 MG/1
5 TABLET ORAL EVERY 4 HOURS PRN
Status: DISCONTINUED | OUTPATIENT
Start: 2023-05-18 | End: 2023-05-19 | Stop reason: HOSPADM

## 2023-05-18 RX ORDER — ONDANSETRON 2 MG/ML
INJECTION INTRAMUSCULAR; INTRAVENOUS AS NEEDED
Status: DISCONTINUED | OUTPATIENT
Start: 2023-05-18 | End: 2023-05-18

## 2023-05-18 RX ORDER — OXYCODONE HYDROCHLORIDE 10 MG/1
10 TABLET ORAL EVERY 4 HOURS PRN
Status: DISCONTINUED | OUTPATIENT
Start: 2023-05-18 | End: 2023-05-19 | Stop reason: HOSPADM

## 2023-05-18 RX ORDER — SODIUM CHLORIDE 9 MG/ML
125 INJECTION, SOLUTION INTRAVENOUS CONTINUOUS
Status: DISCONTINUED | OUTPATIENT
Start: 2023-05-18 | End: 2023-05-18

## 2023-05-18 RX ORDER — ONDANSETRON 2 MG/ML
4 INJECTION INTRAMUSCULAR; INTRAVENOUS ONCE AS NEEDED
Status: DISCONTINUED | OUTPATIENT
Start: 2023-05-18 | End: 2023-05-18 | Stop reason: HOSPADM

## 2023-05-18 RX ORDER — HYDROMORPHONE HCL/PF 1 MG/ML
0.5 SYRINGE (ML) INJECTION
Status: DISCONTINUED | OUTPATIENT
Start: 2023-05-18 | End: 2023-05-18

## 2023-05-18 RX ORDER — HYDROMORPHONE HCL 110MG/55ML
PATIENT CONTROLLED ANALGESIA SYRINGE INTRAVENOUS AS NEEDED
Status: DISCONTINUED | OUTPATIENT
Start: 2023-05-18 | End: 2023-05-18

## 2023-05-18 RX ORDER — OXYCODONE HYDROCHLORIDE 5 MG/1
5 TABLET ORAL EVERY 4 HOURS PRN
Qty: 20 TABLET | Refills: 0 | Status: SHIPPED | OUTPATIENT
Start: 2023-05-18 | End: 2023-05-28

## 2023-05-18 RX ORDER — SODIUM CHLORIDE 9 MG/ML
125 INJECTION, SOLUTION INTRAVENOUS CONTINUOUS
Status: DISCONTINUED | OUTPATIENT
Start: 2023-05-18 | End: 2023-05-19

## 2023-05-18 RX ADMIN — SODIUM CHLORIDE 125 ML/HR: 0.9 INJECTION, SOLUTION INTRAVENOUS at 22:10

## 2023-05-18 RX ADMIN — ONDANSETRON 4 MG: 2 INJECTION INTRAMUSCULAR; INTRAVENOUS at 22:04

## 2023-05-18 RX ADMIN — FENTANYL CITRATE 50 MCG: 50 INJECTION INTRAMUSCULAR; INTRAVENOUS at 14:35

## 2023-05-18 RX ADMIN — HYDROMORPHONE HYDROCHLORIDE 0.5 MG: 1 INJECTION, SOLUTION INTRAMUSCULAR; INTRAVENOUS; SUBCUTANEOUS at 17:55

## 2023-05-18 RX ADMIN — ONDANSETRON 4 MG: 2 INJECTION INTRAMUSCULAR; INTRAVENOUS at 16:17

## 2023-05-18 RX ADMIN — LIDOCAINE HYDROCHLORIDE 50 MG: 10 INJECTION, SOLUTION EPIDURAL; INFILTRATION; INTRACAUDAL; PERINEURAL at 14:35

## 2023-05-18 RX ADMIN — FENTANYL CITRATE 25 MCG: 50 INJECTION, SOLUTION INTRAMUSCULAR; INTRAVENOUS at 16:40

## 2023-05-18 RX ADMIN — SODIUM CHLORIDE 125 ML/HR: 0.9 INJECTION, SOLUTION INTRAVENOUS at 18:43

## 2023-05-18 RX ADMIN — FENTANYL CITRATE 25 MCG: 50 INJECTION, SOLUTION INTRAMUSCULAR; INTRAVENOUS at 16:45

## 2023-05-18 RX ADMIN — DEXAMETHASONE SODIUM PHOSPHATE 10 MG: 10 INJECTION INTRAMUSCULAR; INTRAVENOUS at 15:38

## 2023-05-18 RX ADMIN — PROPOFOL 200 MG: 10 INJECTION, EMULSION INTRAVENOUS at 14:35

## 2023-05-18 RX ADMIN — SCOPALAMINE 1 PATCH: 1 PATCH, EXTENDED RELEASE TRANSDERMAL at 22:09

## 2023-05-18 RX ADMIN — SUGAMMADEX 200 MG: 100 INJECTION, SOLUTION INTRAVENOUS at 16:12

## 2023-05-18 RX ADMIN — CEFAZOLIN SODIUM 2000 MG: 2 SOLUTION INTRAVENOUS at 14:31

## 2023-05-18 RX ADMIN — ROCURONIUM BROMIDE 50 MG: 10 INJECTION, SOLUTION INTRAVENOUS at 14:35

## 2023-05-18 RX ADMIN — FENTANYL CITRATE 50 MCG: 50 INJECTION INTRAMUSCULAR; INTRAVENOUS at 14:53

## 2023-05-18 RX ADMIN — HYDROMORPHONE HYDROCHLORIDE 0.5 MG: 1 INJECTION, SOLUTION INTRAMUSCULAR; INTRAVENOUS; SUBCUTANEOUS at 17:10

## 2023-05-18 RX ADMIN — IBUPROFEN 600 MG: 600 TABLET ORAL at 19:47

## 2023-05-18 RX ADMIN — KETOROLAC TROMETHAMINE 30 MG: 30 INJECTION, SOLUTION INTRAMUSCULAR at 16:16

## 2023-05-18 RX ADMIN — SODIUM CHLORIDE 125 ML/HR: 0.9 INJECTION, SOLUTION INTRAVENOUS at 12:31

## 2023-05-18 RX ADMIN — MIDAZOLAM 2 MG: 1 INJECTION INTRAMUSCULAR; INTRAVENOUS at 14:30

## 2023-05-18 RX ADMIN — FENTANYL CITRATE 25 MCG: 50 INJECTION, SOLUTION INTRAMUSCULAR; INTRAVENOUS at 16:50

## 2023-05-18 RX ADMIN — SCOPALAMINE 1 PATCH: 1 PATCH, EXTENDED RELEASE TRANSDERMAL at 12:39

## 2023-05-18 RX ADMIN — OXYCODONE HYDROCHLORIDE 10 MG: 10 TABLET ORAL at 23:25

## 2023-05-18 RX ADMIN — HYDROMORPHONE HYDROCHLORIDE 0.5 MG: 1 INJECTION, SOLUTION INTRAMUSCULAR; INTRAVENOUS; SUBCUTANEOUS at 17:31

## 2023-05-18 RX ADMIN — OXYCODONE HYDROCHLORIDE 5 MG: 5 TABLET ORAL at 21:42

## 2023-05-18 RX ADMIN — HYDROMORPHONE HYDROCHLORIDE 0.5 MG: 2 INJECTION INTRAMUSCULAR; INTRAVENOUS; SUBCUTANEOUS at 16:10

## 2023-05-18 RX ADMIN — FENTANYL CITRATE 25 MCG: 50 INJECTION, SOLUTION INTRAMUSCULAR; INTRAVENOUS at 16:35

## 2023-05-18 RX ADMIN — HYDROMORPHONE HYDROCHLORIDE 0.5 MG: 1 INJECTION, SOLUTION INTRAMUSCULAR; INTRAVENOUS; SUBCUTANEOUS at 16:53

## 2023-05-18 NOTE — OP NOTE
OPERATIVE REPORT  PATIENT NAME: Charbel Hodges    :  1990  MRN: [id number]  Pt Location: [facility] ROOM 07    SURGERY DATE: 2023    Surgeon(s) and Role:     Luba Marei MD - Primary     * Kiley Loredo MD - Assisting    Preop Diagnosis:  Abnormal uterine bleeding [N93 9]  Pelvic pain [R10 2]  Endometriosis [N80 9]    Post-Op Diagnosis Codes:     * Abnormal uterine bleeding [N93 9]     * Pelvic pain [R10 2]     * Endometriosis [N80 9]    Procedure(s):  (LTH) W/  LEFT OOPHORECTOMY  CYSTO    Specimen(s):  ID Type Source Tests Collected by Time Destination   1 : uterus, cervix, left ovary Tissue Uterus TISSUE EXAM Enoch Marie MD 2023 1547        Estimated Blood Loss:   50mL    Drains: None    Anesthesia Type:   General endotracheal    Operative Indications:  Abnormal uterine bleeding [N93 9]  Pelvic pain [R10 2]  Endometriosis [N80 9]    Operative Findings:  Normal external female genitalia and urethral meatus  On bimanual exam the uterus was normal in size and mobile and there was fullness in the left adnexa  Normal vaginal mucosa  Uterus had good descensus  Cervix was parous in appearance with no lesions  The uterus sounded to 10cm  On laparoscopy, there was an implant of endometriosis on the right ovary which also contained a simple appearing cyst  There was evidence of prior bilateral salpingectomy but there was a small remnant of the fimbriated end of the left fallopian tube on the left ovary  The left ovary was adherent to the left pelvic sidewall and contained an endometrioma that was inadvertently ruptured at time of oophorectomy  There were sigmoid adhesions along the left pelvic sidewall    The external uterine contour was normal  On cytoscopy, the bladder was intact with bilateral urethral jets visualized and no evidence of injury or intravesical suture material     Complications:   None apparent    Procedure and Technique:  The patient was taken to the operating room where she was correctly identified and general anesthesia was administered without difficulty  SCDs were applied to the lower extremities  Ancef 2g was administered for surgical prophylaxis  She was then positioned in dorsal lithotomy using yellowfin stirrups with the arms tucked  All pressure points were padded  The vagina was prepped with Chlorhexidine  The abdomen was prepped with Chloraprep and appropriate drying time allowed before sterile drapes were applied  Time-out was performed with all parties in agreement  The anterior lip of the cervix was visualized and grasped with a single-tooth tenaculum  The uterus sounded to 10cm in mid position  The cervix was serially dilated to accommodate the MACARENA uterine manipulator tip  Stay sutures were applied to the anterior lip of the cervix  The single-tooth tenaculum was removed and the MACARENA uterine manipulator placed without incident  The Brewster catheter was inserted into the bladder and the vaginal cuff occluder inflated  Sterile gloves were exchanged and attention turned to the abdomen  Local anesthesia was infiltrated below the umbilicus  The Veress needle was then introduced at a 45 degree angle while tenting the abdominal wall  Intraperitoneal placement was confirmed with the saline drop test  Pneumoperitoneum was established  Incision was then made below the umbilicus to accommodate the 5 mm trocar which was inserted under direct visualization  There was no evidence of injury directly below the trocar or Veress needle insertion sites  Pneumoperitoneum was then maintained to a maximum of 15 mmHg  Subsequently, after infiltrating the areas with local anesthesia, two additional small incisions were made in both the right and left lower quadrants, approximately 3 cm above and 3 cm medial to the anterior superior iliac spine  Two ports (5mm, 5mm) were introduced under direct visualization at these sites   The patient was placed in trendelenburg, and attention was then turned to the right cornua and adnexa  The Enseal dissecting instrument was selected and used to coagulate and cut the round ligament and utero-ovarian ligament  The broad ligament was then coagulated and cut to the level of the uterine arteries  A bladder flap was then initiated and dissected approximately half of the way across the uterus at the level of the lower uterine segment  The right uterine arteries were then coagulated  Attention was turned to left the adnexa  The course of the left ureter was identified  The left round and utero-ovarian ligaments were coagulated and cut  The broad ligament was coagulated and cut and the bladder flap completed with the Enseal  The left uterine artery was coagulated and cut  The right uterine artery was again coagulated and cut  At this point, the uterus was visualized and noted to be blanched  The colpotomy was performed with the monopolar J hook  Once the colpotomy was completed, left oophorectomy was completed by coagulating and cutting the infundibulopelvic ligament and dissecting the ovary off the sidewall  The endometrioma was inadvertently ruptured during dissection off the pelvic sidewall  The pelvis was irrigated  The vaginal cuff occluder was deflated, and the uterus, cervix, and left ovary were removed from the vagina and sent for pathological analysis  The Stratifix was placed through the vagina  Pneumoperitoneum was reestablished by occluding the vagina  The vaginal cuff was closed in a running fashion  Hemostasis was confirmed at all surgical sites  The suture was cut, and the needle and remaining suture were removed with the right lower quadrant trocar  The trocar incisions were closed using 4-0 Monocryl  The garber was removed and cystoscopy was performed  The bladder was confirmed to be intact with no evidence of intravesical suture material  Bilateral ureteral jets were visualized   Cystoscope was withdrawn and the bladder was drained  Patient was awakened, extubated and transferred to the recovery room in stable condition  She tolerate the procedure well  All needle, sponge, and instrument counts were noted to be correct x 2 at the end of the procedure  Dr Kellie Caballero was present for the entire procedure      Patient Disposition:  PACU         SIGNATURE: Tran Robert MD  DATE: May 18, 2023  TIME: 4:32 PM

## 2023-05-18 NOTE — ANESTHESIA POSTPROCEDURE EVALUATION
Post-Op Assessment Note    No notable events documented      /50 (05/18/23 1731)    Temp     Pulse 78 (05/18/23 1731)   Resp 12 (05/18/23 1731)    SpO2 96 % (05/18/23 1731)

## 2023-05-18 NOTE — DISCHARGE INSTR - AVS FIRST PAGE
[facility] [address]  Office: [phone]     Postoperative Instructions        WHAT YOU NEED TO KNOW:   You had an uncomplicated total laparoscopic hysterectomy and left oophorectomy  DISCHARGE INSTRUCTIONS:   Contact your doctor at the number above if:   You have a fever over 101o  You have nausea or are vomiting that does not improve after a light meal    Your pain is getting worse, even after you take medicine  You feel pain or burning when you urinate, or you have trouble urinating  You have pus or a foul-smelling odor coming from your vagina  Your wound is red, swollen, or draining pus  You see new or an increased amount of bright red blood coming from your vagina or your incisions  You have questions or concerns about your condition or care  Seek care immediately:   You have heavy vaginal bleeding that fills 1 or more sanitary pads in 1 hour  Call 911 for any of the following: You feel lightheaded, short of breath, and have chest pain  You cough up blood  Medicines: You may need any of the following:  NSAIDs, such as ibuprofen, which help decrease swelling and pain  Activity:   Rest as needed  Get up and move around as directed to help prevent blood clots  Start with short walks and slowly increase the distance every day  You may shower as soon as the day of surgery  Tub baths can be taken starting 2 weeks after surgery  Do not go into pools or hot tubs until cleared by your doctor

## 2023-05-18 NOTE — ANESTHESIA PREPROCEDURE EVALUATION
Procedure:  ([address]) W/ PSB LEFT OOPHORECTOMY (Abdomen)  CYSTECTOMY OVARIAN, LAP (Left: Uterus)  EXCISION OF ENDOMETRIOSIS (Uterus)  CYSTO (Bladder)    Relevant Problems   ANESTHESIA   (+) PONV (postoperative nausea and vomiting)      CARDIO   (+) Chest pain   (+) PAC (premature atrial contraction)   (+) Paroxysmal atrial fibrillation (HCC)      NEURO/PSYCH   (+) Anxiety   (+) Panic attacks   (+) Situational depression      Other   (+) Obesity (BMI 30 0-34  9)        Physical Exam    Airway    Mallampati score: II  TM Distance: >3 FB  Neck ROM: full     Dental   No notable dental hx     Cardiovascular  Rhythm: regular, Rate: normal, Cardiovascular exam normal    Pulmonary  Pulmonary exam normal Breath sounds clear to auscultation,     Other Findings        Anesthesia Plan  ASA Score- 2     Anesthesia Type- general with ASA Monitors  Additional Monitors:   Airway Plan: ETT  Comment: SCOP patch ordered  Plan Factors-    Chart reviewed  Patient summary reviewed  Patient is not a current smoker  Patient not instructed to abstain from smoking on day of procedure  Patient did not smoke on day of surgery  There is medical exclusion for perioperative obstructive sleep apnea risk education  Induction- intravenous  Postoperative Plan- Plan for postoperative opioid use  Planned trial extubation    Informed Consent- Anesthetic plan and risks discussed with patient

## 2023-05-18 NOTE — INTERVAL H&P NOTE
H&P reviewed  After examining the patient I find no changes in the patients condition since the H&P had been written      Vitals:    05/18/23 1209   BP: 111/56   Pulse: 55   Resp: 14   Temp: 98 5 °F (36 9 °C)   SpO2: 99%     Tonny Izquierdo MD  [address] [place] [address]  5/18/2023 1:54 PM

## 2023-05-19 VITALS
HEART RATE: 53 BPM | WEIGHT: 189.15 LBS | SYSTOLIC BLOOD PRESSURE: 102 MMHG | OXYGEN SATURATION: 96 % | TEMPERATURE: 98.5 F | DIASTOLIC BLOOD PRESSURE: 55 MMHG | RESPIRATION RATE: 16 BRPM | BODY MASS INDEX: 34.81 KG/M2 | HEIGHT: 62 IN

## 2023-05-19 LAB
ANION GAP SERPL CALCULATED.3IONS-SCNC: 6 MMOL/L (ref 4–13)
BUN SERPL-MCNC: 9 MG/DL (ref 5–25)
CALCIUM SERPL-MCNC: 7.7 MG/DL (ref 8.4–10.2)
CHLORIDE SERPL-SCNC: 107 MMOL/L (ref 96–108)
CO2 SERPL-SCNC: 24 MMOL/L (ref 21–32)
CREAT SERPL-MCNC: 0.69 MG/DL (ref 0.6–1.3)
ERYTHROCYTE [DISTWIDTH] IN BLOOD BY AUTOMATED COUNT: 12.2 % (ref 11.6–15.1)
GFR SERPL CREATININE-BSD FRML MDRD: 113 ML/MIN/1.73SQ M
GLUCOSE SERPL-MCNC: 132 MG/DL (ref 65–140)
HCT VFR BLD AUTO: 35.3 % (ref 34.8–46.1)
HGB BLD-MCNC: 11.3 G/DL (ref 11.5–15.4)
MCH RBC QN AUTO: 32.3 PG (ref 26.8–34.3)
MCHC RBC AUTO-ENTMCNC: 32 G/DL (ref 31.4–37.4)
MCV RBC AUTO: 101 FL (ref 82–98)
PLATELET # BLD AUTO: 334 THOUSANDS/UL (ref 149–390)
PMV BLD AUTO: 9.4 FL (ref 8.9–12.7)
POTASSIUM SERPL-SCNC: 4 MMOL/L (ref 3.5–5.3)
RBC # BLD AUTO: 3.5 MILLION/UL (ref 3.81–5.12)
SODIUM SERPL-SCNC: 137 MMOL/L (ref 135–147)
WBC # BLD AUTO: 12 THOUSAND/UL (ref 4.31–10.16)

## 2023-05-19 RX ORDER — ONDANSETRON 4 MG/1
4 TABLET, FILM COATED ORAL EVERY 6 HOURS PRN
Qty: 20 TABLET | Refills: 0 | Status: SHIPPED | OUTPATIENT
Start: 2023-05-19

## 2023-05-19 RX ORDER — ENOXAPARIN SODIUM 100 MG/ML
40 INJECTION SUBCUTANEOUS
Status: DISCONTINUED | OUTPATIENT
Start: 2023-05-19 | End: 2023-05-19 | Stop reason: HOSPADM

## 2023-05-19 RX ORDER — KETOROLAC TROMETHAMINE 30 MG/ML
30 INJECTION, SOLUTION INTRAMUSCULAR; INTRAVENOUS ONCE
Status: COMPLETED | OUTPATIENT
Start: 2023-05-19 | End: 2023-05-19

## 2023-05-19 RX ORDER — IBUPROFEN 600 MG/1
600 TABLET ORAL EVERY 6 HOURS PRN
Status: DISCONTINUED | OUTPATIENT
Start: 2023-05-19 | End: 2023-05-19 | Stop reason: HOSPADM

## 2023-05-19 RX ORDER — METOPROLOL SUCCINATE 25 MG/1
25 TABLET, EXTENDED RELEASE ORAL DAILY
Status: DISCONTINUED | OUTPATIENT
Start: 2023-05-19 | End: 2023-05-19 | Stop reason: HOSPADM

## 2023-05-19 RX ADMIN — ACETAMINOPHEN 650 MG: 325 TABLET ORAL at 12:45

## 2023-05-19 RX ADMIN — OXYCODONE HYDROCHLORIDE 5 MG: 5 TABLET ORAL at 03:54

## 2023-05-19 RX ADMIN — ACETAMINOPHEN 650 MG: 325 TABLET ORAL at 00:37

## 2023-05-19 RX ADMIN — ENOXAPARIN SODIUM 40 MG: 40 INJECTION SUBCUTANEOUS at 09:06

## 2023-05-19 RX ADMIN — OXYCODONE HYDROCHLORIDE 10 MG: 10 TABLET ORAL at 09:12

## 2023-05-19 RX ADMIN — IBUPROFEN 600 MG: 600 TABLET, FILM COATED ORAL at 00:37

## 2023-05-19 RX ADMIN — KETOROLAC TROMETHAMINE 30 MG: 30 INJECTION, SOLUTION INTRAMUSCULAR; INTRAVENOUS at 12:46

## 2023-05-19 RX ADMIN — IBUPROFEN 600 MG: 600 TABLET, FILM COATED ORAL at 06:18

## 2023-05-19 RX ADMIN — SODIUM CHLORIDE 125 ML/HR: 0.9 INJECTION, SOLUTION INTRAVENOUS at 06:20

## 2023-05-19 RX ADMIN — ACETAMINOPHEN 650 MG: 325 TABLET ORAL at 06:18

## 2023-05-19 RX ADMIN — OXYCODONE HYDROCHLORIDE 10 MG: 10 TABLET ORAL at 15:21

## 2023-05-19 NOTE — PLAN OF CARE
Problem: PAIN - ADULT  Goal: Verbalizes/displays adequate comfort level or baseline comfort level  Description: Interventions:  - Encourage patient to monitor pain and request assistance  - Assess pain using appropriate pain scale  - Administer analgesics based on type and severity of pain and evaluate response  - Implement non-pharmacological measures as appropriate and evaluate response  - Consider cultural and social influences on pain and pain management  - Notify physician/advanced practitioner if interventions unsuccessful or patient reports new pain  Outcome: Progressing     Problem: INFECTION - ADULT  Goal: Absence or prevention of progression during hospitalization  Description: INTERVENTIONS:  - Assess and monitor for signs and symptoms of infection  - Monitor lab/diagnostic results  - Monitor all insertion sites, i e  indwelling lines, tubes, and drains  - Monitor endotracheal if appropriate and nasal secretions for changes in amount and color  - [place] appropriate cooling/warming therapies per order  - Administer medications as ordered  - Instruct and encourage patient and family to use good hand hygiene technique  - Identify and instruct in appropriate isolation precautions for identified infection/condition  Outcome: Progressing     Problem: SAFETY ADULT  Goal: Patient will remain free of falls  Description: INTERVENTIONS:  - Educate patient/family on patient safety including physical limitations  - Instruct patient to call for assistance with activity   - Consult OT/PT to assist with strengthening/mobility   - Keep Call bell within reach  - Keep bed low and locked with side rails adjusted as appropriate  - Keep care items and personal belongings within reach  - Initiate and maintain comfort rounds  - Apply yellow socks and bracelet for high fall risk patients  - Consider moving patient to room near nurses station  Outcome: Progressing     Problem: DISCHARGE PLANNING  Goal: Discharge to home or other facility with appropriate resources  Description: INTERVENTIONS:  - Identify barriers to discharge w/patient and caregiver  - Arrange for needed discharge resources and transportation as appropriate  - Identify discharge learning needs (meds, wound care, etc )  - Arrange for interpretive services to assist at discharge as needed  - Refer to Case Management Department for coordinating discharge planning if the patient needs post-hospital services based on physician/advanced practitioner order or complex needs related to functional status, cognitive ability, or social support system  Outcome: Progressing     Problem: RESPIRATORY - ADULT  Goal: Achieves optimal ventilation and oxygenation  Description: INTERVENTIONS:  - Assess for changes in respiratory status  - Assess for changes in mentation and behavior  - Position to facilitate oxygenation and minimize respiratory effort  - Oxygen administered by appropriate delivery if ordered  - Initiate smoking cessation education as indicated  - Encourage broncho-pulmonary hygiene including cough, deep breathe, Incentive Spirometry  - Assess the need for suctioning and aspirate as needed  - Assess and instruct to report SOB or any respiratory difficulty  - Respiratory Therapy support as indicated  Outcome: Progressing     Problem: GASTROINTESTINAL - ADULT  Goal: Minimal or absence of nausea and/or vomiting  Description: INTERVENTIONS:  - Administer IV fluids if ordered to ensure adequate hydration  - Maintain NPO status until nausea and vomiting are resolved  - Nasogastric tube if ordered  - Administer ordered antiemetic medications as needed  - Provide nonpharmacologic comfort measures as appropriate  - Advance diet as tolerated, if ordered  - Consider nutrition services referral to assist patient with adequate nutrition and appropriate food choices  Outcome: Progressing     Problem: SKIN/TISSUE INTEGRITY - ADULT  Goal: Incision(s), wounds(s) or drain site(s) healing without S/S of infection  Description: INTERVENTIONS  - Assess and document dressing, incision, wound bed, drain sites and surrounding tissue  - Provide patient and family education  - Perform skin care/dressing changes every shift  Outcome: Progressing

## 2023-05-19 NOTE — PROGRESS NOTES
"Gynecology Progress Note  Charbel Hodges 28 y o  female MRN: [id number]  Unit/Bed#: [place] [address] 222-02 Encounter: [id number]    Assessment/Plan:  31yo s/p total laparoscopic hysterectomy and left oophorectomy for endometriosis and pelvic pain  By issue:    * S/P laparoscopic hysterectomy  Assessment & Plan  Pain regimen: scheduled Motrin/Tylenol with as needed Roxicodone  CBC/BMP wnl this morning   Voiding  Discontinue IV fluids  Regular diet as tolerated  Encourage use of incentive spirometer  Out of bed and ambulation as tolerated      PONV (postoperative nausea and vomiting)  Assessment & Plan  Zofran IV as needed  Scopolamine patch in place    Obesity (BMI 30 0-34  9)  Assessment & Plan  VTE chemoprophylaxis with Lovenox 40mg daily   Encourage ambulation    Paroxysmal atrial fibrillation (HCC)  Assessment & Plan  Continue Toprol-XL 25mg daily         Subjective:   Kelsey Chapman is still in pain this morning, worse on the left side of her abdomen  Her nausea is improved but she has not yet had anything to eat  She has not had return of bowel function  She is using the incentive spirometer  She ambulates to the restroom to void and is voiding without difficulty  She does not have vaginal bleeding, chest pain, SOB, fevers or chills  Objective:   Vitals: Blood pressure 99/51, pulse 55, temperature 98 9 °F (37 2 °C), temperature source Oral, resp  rate 18, height 5' 2\" (1 575 m), weight 85 8 kg (189 lb 2 5 oz), last menstrual period 05/09/2023, SpO2 92 %  Physical Exam:   Physical Exam  Vitals reviewed  Constitutional:       General: She is not in acute distress  Appearance: Normal appearance  She is not ill-appearing or diaphoretic  Cardiovascular:      Rate and Rhythm: Normal rate  Pulmonary:      Effort: Pulmonary effort is normal    Abdominal:      General: There is no distension  Palpations: Abdomen is soft  Tenderness: There is no abdominal tenderness  There is no guarding        Comments: 3 " laparoscopic port site incisions are clean, dry and intact   Musculoskeletal:         General: No tenderness  Skin:     General: Skin is warm and dry  Neurological:      Mental Status: She is alert and oriented to person, place, and time  Psychiatric:         Behavior: Behavior normal          Lab, Imaging and other studies: reviewed      Jane Marquis MD  PGY-III, OB/GYN  5/19/2023, 7:00 AM

## 2023-05-19 NOTE — H&P
H&P Exam - Gynecology   Guero Arnold 28 y o  female MRN: [id number]  Unit/Bed#: [place] [address] 222-02 Encounter: [id number]    Assessment: Guero Arnold is a 28 y o  female admitted for extended recovery following an uncomplicated TLH with LSO and cysto  Plan:  * S/P laparoscopic hysterectomy  Assessment & Plan  EBL 50cc, vitals now normal  Voiding spontaneously  Scheduled tylenol/motrin with luiz PRN for pain  Encouraged OOB and incentive spirometry    Obesity (BMI 30 0-34  9)  Assessment & Plan  SCDs for DVT ppx    PONV (postoperative nausea and vomiting)  Assessment & Plan  Zofran PRN  Scopolamine patch in place        History of Present Illness   HPI:  Guero Arnold is a 28 y o  female admitted for extended recovery following an uncomplicated TLH with LSO and cysto  In PACU, patient attempted to stand and ambulate to the bathroom but stated she felt too weak to move and felt nauseated, dizzy and was unable to take a step  Patient was also unable to void at that time  She is now resting comfortably in bed and denies N/V however she is having generalized abdominal pain  Review of Systems   Constitutional: Negative  Respiratory: Negative  Cardiovascular: Negative  Gastrointestinal: Positive for abdominal pain  Endocrine: Negative  Genitourinary: Positive for difficulty urinating  Musculoskeletal: Negative  Neurological: Positive for dizziness and weakness  Psychiatric/Behavioral: Negative          Historical Information   Past Medical History:   Diagnosis Date   • Abnormal uterine bleeding (AUB)    • Abrasion     left knee- scabbed   • Anxiety    • COVID     2022   • Endometriosis    • Headache     frequent   • Heart palpitations    • Paroxysmal atrial fibrillation ([address] )     2021   • Pelvic pain    • PONV (postoperative nausea and vomiting)    • Wears glasses      Past Surgical History:   Procedure Laterality Date   • DILATION AND CURETTAGE OF UTERUS     • LAPAROSCOPY     • MS DILATION & CURETTAGE DX&/THER NONOBSTETRIC N/A 2022    Procedure: DILATATION AND CURETTAGE (D&C); Surgeon: Mercedes Michelle MD;  Location: [facility];  Service: Gynecology   • TN LAPAROSCOPY W/RMVL ADNEXAL STRUCTURES N/A 2022    Procedure: BILATERAL SALPINGECTOMY, LAPAROSCOPIC;  Surgeon: Mercedes Michelle MD;  Location: [facility];  Service: Gynecology   • TN LAPS ABD PRTM&OMENTUM DX W/WO SPEC BR/WA SPX N/A 2022    Procedure: LAPAROSCOPY DIAGNOSTIC;  Surgeon: Mercedes Michelle MD;  Location: [facility];  Service: Gynecology     OB History    Para Term  AB Living   6 2 2   4 2   SAB IAB Ectopic Multiple Live Births   3   1   2      # Outcome Date GA Lbr Ian/2nd Weight Sex Delivery Anes PTL Lv   6 Ectopic      ECTOPIC      5 SAB            4 SAB            3 Term     F Vag-Spont      2 SAB            1 Term     F Vag-Spont        Family History   Problem Relation Age of Onset   • Heart disease Mother    • Stroke Mother    • Graves' disease Mother    • No Known Problems Father    • No Known Problems Sister    • No Known Problems Brother    • No Known Problems Brother    • No Known Problems Daughter    • No Known Problems Daughter    • Heart disease Maternal Grandmother    • Diabetes Maternal Grandmother    • Heart disease Maternal Grandfather    • Diabetes Maternal Grandfather    • No Known Problems Paternal Grandmother    • No Known Problems Paternal Grandfather    • Breast cancer Neg Hx    • Colon cancer Neg Hx    • Ovarian cancer Neg Hx      Social History   Social History     Substance and Sexual Activity   Alcohol Use Yes    Comment: social     Social History     Substance and Sexual Activity   Drug Use Not Currently     Social History     Tobacco Use   Smoking Status Never   Smokeless Tobacco Never       Meds/Allergies   Medications Prior to Admission   Medication   • acetaminophen (TYLENOL) 325 mg tablet   • metoprolol succinate (TOPROL-XL) 25 mg 24 hr tablet   • [DISCONTINUED] "ibuprofen (MOTRIN) 200 mg tablet     No Known Allergies    Objective   BP 99/51   Pulse 55   Temp 98 9 °F (37 2 °C) (Oral)   Resp 18   Ht 5' 2\" (1 575 m)   Wt 85 8 kg (189 lb 2 5 oz)   LMP 05/09/2023   SpO2 92%   BMI 34 60 kg/m²       Intake/Output Summary (Last 24 hours) at 5/19/2023 0033  Last data filed at 5/18/2023 2325  Gross per 24 hour   Intake 2800 ml   Output 650 ml   Net 2150 ml       Invasive Devices: Invasive Devices     Peripheral Intravenous Line  Duration           Peripheral IV 05/18/23 Dorsal (posterior); Proximal;Right Forearm <1 day          Drain  Duration           NG/OG/Enteral Tube Orogastric 18 Fr Center mouth 338 days                Physical Exam  Vitals reviewed  Constitutional:       General: She is not in acute distress  Appearance: She is normal weight  HENT:      Mouth/Throat:      Mouth: Mucous membranes are moist       Pharynx: Oropharynx is clear  Cardiovascular:      Rate and Rhythm: Normal rate and regular rhythm  Pulmonary:      Effort: Pulmonary effort is normal  No respiratory distress  Abdominal:      General: There is no distension  Palpations: Abdomen is soft  Tenderness: There is abdominal tenderness  Comments: Laparoscopic port sites c/d/i covered with glue   Skin:     General: Skin is warm  Neurological:      General: No focal deficit present  Mental Status: She is alert and oriented to person, place, and time           Lab Results:   Admission on 05/18/2023   Component Date Value   • ABO Grouping 05/18/2023 O    • Rh Factor 05/18/2023 Positive    • Antibody Screen 05/18/2023 Negative    • Specimen Expiration Date 05/18/2023 [id number]    • WBC 05/18/2023 6 90    • RBC 05/18/2023 3 89    • Hemoglobin 05/18/2023 12 8    • Hematocrit 05/18/2023 37 5    • MCV 05/18/2023 96    • MCH 05/18/2023 32 9    • MCHC 05/18/2023 34 1    • RDW 05/18/2023 12 2    • MPV 05/18/2023 8 6 (L)    • Platelets 76/13/9230 326    • nRBC 05/18/2023 0    • " Neutrophils Relative 05/18/2023 62    • Immat GRANS % 05/18/2023 0    • Lymphocytes Relative 05/18/2023 27    • Monocytes Relative 05/18/2023 8    • Eosinophils Relative 05/18/2023 2    • Basophils Relative 05/18/2023 1    • Neutrophils Absolute 05/18/2023 4 32    • Immature Grans Absolute 05/18/2023 0 02    • Lymphocytes Absolute 05/18/2023 1 83    • Monocytes Absolute 05/18/2023 0 57    • Eosinophils Absolute 05/18/2023 0 12    • Basophils Absolute 05/18/2023 0 04    • EXT Preg Test, Ur 05/18/2023 Negative    • Control 05/18/2023 Valid           Daniel Farley MD  5/19/2023  12:33 AM

## 2023-05-19 NOTE — PLAN OF CARE
Problem: PAIN - ADULT  Goal: Verbalizes/displays adequate comfort level or baseline comfort level  Description: Interventions:  - Encourage patient to monitor pain and request assistance  - Assess pain using appropriate pain scale  - Administer analgesics based on type and severity of pain and evaluate response  - Implement non-pharmacological measures as appropriate and evaluate response  - Consider cultural and social influences on pain and pain management  - Notify physician/advanced practitioner if interventions unsuccessful or patient reports new pain  5/19/2023 1614 by Lorena Perez RN  Outcome: Adequate for Discharge  5/19/2023 1101 by Lorena Perez RN  Outcome: Progressing     Problem: INFECTION - ADULT  Goal: Absence or prevention of progression during hospitalization  Description: INTERVENTIONS:  - Assess and monitor for signs and symptoms of infection  - Monitor lab/diagnostic results  - Monitor all insertion sites, i e  indwelling lines, tubes, and drains  - Monitor endotracheal if appropriate and nasal secretions for changes in amount and color  - [place] appropriate cooling/warming therapies per order  - Administer medications as ordered  - Instruct and encourage patient and family to use good hand hygiene technique  - Identify and instruct in appropriate isolation precautions for identified infection/condition  5/19/2023 1614 by Lorena Perez RN  Outcome: Adequate for Discharge  5/19/2023 1101 by Lorena Perez RN  Outcome: Progressing     Problem: SAFETY ADULT  Goal: Patient will remain free of falls  Description: INTERVENTIONS:  - Educate patient/family on patient safety including physical limitations  - Instruct patient to call for assistance with activity   - Consult OT/PT to assist with strengthening/mobility   - Keep Call bell within reach  - Keep bed low and locked with side rails adjusted as appropriate  - Keep care items and personal belongings within reach  - Initiate and maintain comfort rounds  - Apply yellow socks and bracelet for high fall risk patients  - Consider moving patient to room near nurses station  5/19/2023 1614 by Maisha Martinez RN  Outcome: Adequate for Discharge  5/19/2023 1101 by Maisha Martinez RN  Outcome: Progressing     Problem: DISCHARGE PLANNING  Goal: Discharge to home or other facility with appropriate resources  Description: INTERVENTIONS:  - Identify barriers to discharge w/patient and caregiver  - Arrange for needed discharge resources and transportation as appropriate  - Identify discharge learning needs (meds, wound care, etc )  - Arrange for interpretive services to assist at discharge as needed  - Refer to Case Management Department for coordinating discharge planning if the patient needs post-hospital services based on physician/advanced practitioner order or complex needs related to functional status, cognitive ability, or social support system  5/19/2023 1614 by Maisha Martinez RN  Outcome: Adequate for Discharge  5/19/2023 1101 by Maisha Martinez RN  Outcome: Progressing     Problem: RESPIRATORY - ADULT  Goal: Achieves optimal ventilation and oxygenation  Description: INTERVENTIONS:  - Assess for changes in respiratory status  - Assess for changes in mentation and behavior  - Position to facilitate oxygenation and minimize respiratory effort  - Oxygen administered by appropriate delivery if ordered  - Initiate smoking cessation education as indicated  - Encourage broncho-pulmonary hygiene including cough, deep breathe, Incentive Spirometry  - Assess the need for suctioning and aspirate as needed  - Assess and instruct to report SOB or any respiratory difficulty  - Respiratory Therapy support as indicated  5/19/2023 1614 by Maisha Martinez RN  Outcome: Adequate for Discharge  5/19/2023 1101 by Maisha Martinez RN  Outcome: Progressing     Problem: GASTROINTESTINAL - ADULT  Goal: Minimal or absence of nausea and/or vomiting  Description: INTERVENTIONS:  - Administer IV fluids if ordered to ensure adequate hydration  - Maintain NPO status until nausea and vomiting are resolved  - Nasogastric tube if ordered  - Administer ordered antiemetic medications as needed  - Provide nonpharmacologic comfort measures as appropriate  - Advance diet as tolerated, if ordered  - Consider nutrition services referral to assist patient with adequate nutrition and appropriate food choices  5/19/2023 1614 by Thom Shannon RN  Outcome: Adequate for Discharge  5/19/2023 1101 by Thom Shannon RN  Outcome: Progressing     Problem: SKIN/TISSUE INTEGRITY - ADULT  Goal: Incision(s), wounds(s) or drain site(s) healing without S/S of infection  Description: INTERVENTIONS  - Assess and document dressing, incision, wound bed, drain sites and surrounding tissue  - Provide patient and family education  - Perform skin care/dressing changes every shift  5/19/2023 1614 by Thom Shannon RN  Outcome: Adequate for Discharge  5/19/2023 1101 by Thom Shannon RN  Outcome: Progressing

## 2023-05-19 NOTE — ASSESSMENT & PLAN NOTE
Pain regimen: scheduled Motrin/Tylenol with as needed Roxicodone  CBC/BMP wnl this morning   Voiding   Discontinue IV fluids  Regular diet as tolerated  Encourage use of incentive spirometer  Out of bed and ambulation as tolerated

## 2023-05-19 NOTE — NURSING NOTE
Patient was d/c'd to home in no distress  Pain medication was administered prior to d/c  Patient was d/c via wheelchair and transferred to the front entrance  Patient was transferred from the wheelchair into the car with all safety precautions taken  Patient left in the care of spouse  All belongings left with the patient

## 2023-05-19 NOTE — PLAN OF CARE
Problem: PAIN - ADULT  Goal: Verbalizes/displays adequate comfort level or baseline comfort level  Description: Interventions:  - Encourage patient to monitor pain and request assistance  - Assess pain using appropriate pain scale  - Administer analgesics based on type and severity of pain and evaluate response  - Implement non-pharmacological measures as appropriate and evaluate response  - Consider cultural and social influences on pain and pain management  - Notify physician/advanced practitioner if interventions unsuccessful or patient reports new pain  Outcome: Progressing     Problem: INFECTION - ADULT  Goal: Absence or prevention of progression during hospitalization  Description: INTERVENTIONS:  - Assess and monitor for signs and symptoms of infection  - Monitor lab/diagnostic results  - Monitor all insertion sites, i e  indwelling lines, tubes, and drains  - Monitor endotracheal if appropriate and nasal secretions for changes in amount and color  - [place] appropriate cooling/warming therapies per order  - Administer medications as ordered  - Instruct and encourage patient and family to use good hand hygiene technique  - Identify and instruct in appropriate isolation precautions for identified infection/condition  Outcome: Progressing     Problem: SAFETY ADULT  Goal: Patient will remain free of falls  Description: INTERVENTIONS:  - Educate patient/family on patient safety including physical limitations  - Instruct patient to call for assistance with activity   - Consult OT/PT to assist with strengthening/mobility   - Keep Call bell within reach  - Keep bed low and locked with side rails adjusted as appropriate  - Keep care items and personal belongings within reach  - Initiate and maintain comfort rounds  - Apply yellow socks and bracelet for high fall risk patients  - Consider moving patient to room near nurses station  Outcome: Progressing     Problem: DISCHARGE PLANNING  Goal: Discharge to home or other facility with appropriate resources  Description: INTERVENTIONS:  - Identify barriers to discharge w/patient and caregiver  - Arrange for needed discharge resources and transportation as appropriate  - Identify discharge learning needs (meds, wound care, etc )  - Arrange for interpretive services to assist at discharge as needed  - Refer to Case Management Department for coordinating discharge planning if the patient needs post-hospital services based on physician/advanced practitioner order or complex needs related to functional status, cognitive ability, or social support system  Outcome: Progressing     Problem: RESPIRATORY - ADULT  Goal: Achieves optimal ventilation and oxygenation  Description: INTERVENTIONS:  - Assess for changes in respiratory status  - Assess for changes in mentation and behavior  - Position to facilitate oxygenation and minimize respiratory effort  - Oxygen administered by appropriate delivery if ordered  - Initiate smoking cessation education as indicated  - Encourage broncho-pulmonary hygiene including cough, deep breathe, Incentive Spirometry  - Assess the need for suctioning and aspirate as needed  - Assess and instruct to report SOB or any respiratory difficulty  - Respiratory Therapy support as indicated  Outcome: Progressing     Problem: GASTROINTESTINAL - ADULT  Goal: Minimal or absence of nausea and/or vomiting  Description: INTERVENTIONS:  - Administer IV fluids if ordered to ensure adequate hydration  - Maintain NPO status until nausea and vomiting are resolved  - Nasogastric tube if ordered  - Administer ordered antiemetic medications as needed  - Provide nonpharmacologic comfort measures as appropriate  - Advance diet as tolerated, if ordered  - Consider nutrition services referral to assist patient with adequate nutrition and appropriate food choices  Outcome: Progressing     Problem: SKIN/TISSUE INTEGRITY - ADULT  Goal: Incision(s), wounds(s) or drain site(s) healing without S/S of infection  Description: INTERVENTIONS  - Assess and document dressing, incision, wound bed, drain sites and surrounding tissue  - Provide patient and family education  - Perform skin care/dressing changes every shift  Outcome: Progressing

## 2023-05-30 ENCOUNTER — TELEPHONE (OUTPATIENT)
Dept: OBGYN CLINIC | Facility: MEDICAL CENTER | Age: 33
End: 2023-05-30

## 2023-05-30 DIAGNOSIS — M79.89 SWELLING OF BOTH LOWER EXTREMITIES: Primary | ICD-10-CM

## 2023-05-30 NOTE — TELEPHONE ENCOUNTER
Pt called complaining of swelling on both legs and change in discoloration  She had surgery on 5/18/23  Per Dr Stuart Mena  Pt needs to have US done STAT  Orders added pt informed

## 2023-05-31 ENCOUNTER — TELEPHONE (OUTPATIENT)
Dept: OBGYN CLINIC | Facility: MEDICAL CENTER | Age: 33
End: 2023-05-31

## 2023-05-31 ENCOUNTER — HOSPITAL ENCOUNTER (OUTPATIENT)
Dept: NON INVASIVE DIAGNOSTICS | Facility: HOSPITAL | Age: 33
Discharge: HOME | End: 2023-05-31
Attending: STUDENT IN AN ORGANIZED HEALTH CARE EDUCATION/TRAINING PROGRAM

## 2023-05-31 DIAGNOSIS — M79.89 SWELLING OF BOTH LOWER EXTREMITIES: ICD-10-CM

## 2023-06-01 ENCOUNTER — OFFICE VISIT (OUTPATIENT)
Dept: OBGYN CLINIC | Facility: MEDICAL CENTER | Age: 33
End: 2023-06-01

## 2023-06-01 VITALS
BODY MASS INDEX: 34.78 KG/M2 | WEIGHT: 189 LBS | HEIGHT: 62 IN | DIASTOLIC BLOOD PRESSURE: 60 MMHG | SYSTOLIC BLOOD PRESSURE: 102 MMHG

## 2023-06-01 DIAGNOSIS — Z09 POSTOP CHECK: Primary | ICD-10-CM

## 2023-06-01 DIAGNOSIS — N63.11 BREAST LUMP ON RIGHT SIDE AT 11 O'CLOCK POSITION: ICD-10-CM

## 2023-06-01 NOTE — PROGRESS NOTES
"[facility] [address] [place] [address]    Assessment/Plan:  Cade Lazo is a 28 y o  [id number] who presents for routine postop care s/p TLH LSO BS and cystoscopy for pelvic pain, AUB, and dysmenorrhea  No problem-specific Assessment & Plan notes found for this encounter  Diagnoses and all orders for this visit:    Postop check    Breast lump on right side at 11 o'clock position  -     US breast right limited (diagnostic); Future  -     Mammo diagnostic right w 3d & cad; Future          Subjective:   Cade Lazo is a 28 y o  [id number] female  CC: Postop    HPI: Baylee Sutherland is doing well postop  She initially had postop pain and nausea but has been doing well  She did notice bilateral leg swelling that concerned her but had rule out LE doppler  She has noted new right breast lump  ROS: Review of Systems   Constitutional: Negative for chills and fever  Respiratory: Negative for cough and shortness of breath  Cardiovascular: Negative for chest pain and leg swelling  Gastrointestinal: Negative for abdominal pain, nausea and vomiting  Genitourinary: Negative for dysuria, frequency and urgency  Neurological: Negative for dizziness, light-headedness and headaches  PFSH: The following portions of the patient's history were reviewed and updated as appropriate: allergies, current medications, past family history, past medical history, obstetric history, gynecologic history, past social history, past surgical history and problem list        Objective:  /60   Ht 5' 2\" (1 575 m)   Wt 85 7 kg (189 lb)   LMP 05/09/2023   BMI 34 57 kg/m²    Physical Exam  Constitutional:       Appearance: Normal appearance  HENT:      Head: Normocephalic and atraumatic  Mouth/Throat:      Mouth: Mucous membranes are moist       Pharynx: Oropharynx is clear  No oropharyngeal exudate  Cardiovascular:      Rate and Rhythm: Normal rate     Pulmonary:      Effort: " Pulmonary effort is normal    Chest:   Breasts:     Right: Mass (small firm, right breast 11 o'clock 5 cm from the nipple) and tenderness present  No bleeding, inverted nipple, nipple discharge or skin change  Left: No bleeding, inverted nipple, mass, nipple discharge, skin change or tenderness  Abdominal:      General: Abdomen is flat  There is no distension  Palpations: Abdomen is soft  There is no mass  Tenderness: There is no abdominal tenderness  Hernia: No hernia is present  Comments: Incision is clean, dry, intact, and well healing  There is no erythema, induration, fluctuance, or drainage  Skin:     General: Skin is warm and dry  Neurological:      General: No focal deficit present  Mental Status: She is alert and oriented to person, place, and time     Psychiatric:         Mood and Affect: Mood normal          Behavior: Behavior normal            Aung Barrios MD  [address] [place] [address]  6/1/2023 4:20 PM

## 2023-06-05 ENCOUNTER — PATIENT MESSAGE (OUTPATIENT)
Dept: OBGYN CLINIC | Facility: MEDICAL CENTER | Age: 33
End: 2023-06-05

## 2023-06-12 NOTE — PATIENT INSTRUCTIONS
Eat  A fresh diet  Stop metoprolol  Avoid caffeine Pt arrives to ED intake rm 6 A&Ox4 and ambulatory c/o sudden onset dizziness this afternoon. Pt has hx of vtach w/ AICD in place, pt states AICD fired in December, pt denies having SOB, or dizziness during that previous episode. Pt denies falls, LOC, head trauma. Pt did have one episode of vomiting after dizziness began. Respirations are even and unlabored. NSR on cardiac monitor. 20G placed to L forearm, labs drawn and sent. Medicated as ordered.

## 2023-06-14 ENCOUNTER — HOSPITAL ENCOUNTER (EMERGENCY)
Facility: HOSPITAL | Age: 33
Discharge: HOME | End: 2023-06-14
Attending: EMERGENCY MEDICINE
Payer: COMMERCIAL

## 2023-06-14 VITALS
SYSTOLIC BLOOD PRESSURE: 103 MMHG | RESPIRATION RATE: 19 BRPM | TEMPERATURE: 98.4 F | HEART RATE: 104 BPM | OXYGEN SATURATION: 98 % | DIASTOLIC BLOOD PRESSURE: 79 MMHG | WEIGHT: 191.36 LBS | BODY MASS INDEX: 35 KG/M2

## 2023-06-14 DIAGNOSIS — S61.431A PUNCTURE WOUND OF RIGHT HAND WITHOUT FOREIGN BODY, INITIAL ENCOUNTER: Primary | ICD-10-CM

## 2023-06-14 PROCEDURE — 90715 TDAP VACCINE 7 YRS/> IM: CPT

## 2023-06-14 RX ADMIN — TETANUS TOXOID, REDUCED DIPHTHERIA TOXOID AND ACELLULAR PERTUSSIS VACCINE, ADSORBED 0.5 ML: 5; 2.5; 8; 8; 2.5 SUSPENSION INTRAMUSCULAR at 23:09

## 2023-06-15 NOTE — ED PROVIDER NOTES
History  Chief Complaint   Patient presents with   • Hand Laceration     Reports cutting an avocado lacerating area between 3rd and forth digits  Bleeding controlled, reports hand numbness  Patient is a 65-year-old female with no significant PMH presenting for evaluation of laceration to right hand  Patient notes that she was using a steak knife to try to take out the pit from an avocado when she accidentally stabbed into her hand between the third and fourth digits at the webspace  She felt instant pain and notes it bled immediately  She notes bleeding subsided with application of pressure  She notes numbness from the wound extending up her hand into her forearm immediately after the injury  However, she notes this has improved and currently notes very minimal numbness/tingling to the site  She denies numbness/tingling to the distal fingertips of the third and fourth digit  She denies decrease in strength of the fingers of the right hand and the right hand itself  She is uncertain of her last Tdap  History provided by:  Patient   used: No        Prior to Admission Medications   Prescriptions Last Dose Informant Patient Reported?  Taking?   acetaminophen (TYLENOL) 325 mg tablet   Yes No   Sig: Take 650 mg by mouth every 4 (four) hours as needed for mild pain   ibuprofen (MOTRIN) 200 mg tablet   No No   Sig: Take 3 tablets (600 mg total) by mouth every 6 (six) hours as needed for mild pain   metoprolol succinate (TOPROL-XL) 25 mg 24 hr tablet   No No   Sig: Take 1 tablet daily   ondansetron (ZOFRAN) 4 mg tablet   No No   Sig: Take 1 tablet (4 mg total) by mouth every 6 (six) hours as needed for nausea or vomiting   Patient not taking: Reported on 6/1/2023      Facility-Administered Medications: None       Past Medical History:   Diagnosis Date   • Abnormal uterine bleeding (AUB)    • Abrasion     left knee- scabbed   • Anxiety    • COVID     2022   • Endometriosis    • Headache frequent   • Heart palpitations    • Paroxysmal atrial fibrillation ([facility] [address] )     2021   • Pelvic pain    • PONV (postoperative nausea and vomiting)    • Wears glasses        Past Surgical History:   Procedure Laterality Date   • DILATION AND CURETTAGE OF UTERUS     • LAPAROSCOPY     • CO CYSTOURETHROSCOPY N/A 5/18/2023    Procedure: Young Rodriguez;  Surgeon: Marko Reynoso MD;  [facility];  Service: Gynecology   • CO DILATION & CURETTAGE DX&/THER NONOBSTETRIC N/A 6/14/2022    Procedure: DILATATION AND CURETTAGE (D&C); Surgeon: Marko Reynoso MD;  [facility];  Service: Gynecology   • CO LAPAROSCOPY W/RMVL ADNEXAL STRUCTURES N/A 6/14/2022    Procedure: BILATERAL SALPINGECTOMY, LAPAROSCOPIC;  Surgeon: Marko Reynoso MD;  [facility];  Service: Gynecology   • CO LAPS ABD PRTM&OMENTUM DX W/WO SPEC BR/WA SPX N/A 6/14/2022    Procedure: LAPAROSCOPY DIAGNOSTIC;  Surgeon: Marko Reynoso MD;  [facility];  Service: Gynecology   • CO LAPS TOTAL HYSTERECT 250 GM/< W/RMVL TUBE/OVARY N/A 5/18/2023    Procedure: ([address] [place] [address]) W/  LEFT OOPHORECTOMY;  Surgeon: Marko Reynoso MD;  [facility];  Service: Gynecology       Family History   Problem Relation Age of Onset   • Heart disease Mother    • Stroke Mother    • Graves' disease Mother    • No Known Problems Father    • No Known Problems Sister    • No Known Problems Brother    • No Known Problems Brother    • No Known Problems Daughter    • No Known Problems Daughter    • Heart disease Maternal Grandmother    • Diabetes Maternal Grandmother    • Heart disease Maternal Grandfather    • Diabetes Maternal Grandfather    • No Known Problems Paternal Grandmother    • No Known Problems Paternal Grandfather    • Breast cancer Neg Hx    • Colon cancer Neg Hx    • Ovarian cancer Neg Hx      I have reviewed and agree with the history as documented      E-Cigarette/Vaping   • E-Cigarette Use Never User      E-Cigarette/Vaping Substances   • Nicotine No    • THC No    • CBD No    • Flavoring No    • Other No    • Unknown No      Social History     Tobacco Use   • Smoking status: Never   • Smokeless tobacco: Never   Vaping Use   • Vaping Use: Never used   Substance Use Topics   • Alcohol use: Yes     Comment: social   • Drug use: Not Currently       Review of Systems   Musculoskeletal: Negative for arthralgias (Denies finger, hand, wrist pain)  Skin: Positive for wound (Laceration to right hand between third and fourth fingers)  Negative for color change and rash  Neurological: Negative for weakness and numbness  All other systems reviewed and are negative  Physical Exam  Physical Exam  Vitals and nursing note reviewed  Constitutional:       General: She is not in acute distress  Appearance: Normal appearance  She is normal weight  She is not ill-appearing, toxic-appearing or diaphoretic  HENT:      Head: Normocephalic and atraumatic  Nose: Nose normal       Mouth/Throat:      Lips: Pink  Mouth: Mucous membranes are moist    Eyes:      Extraocular Movements: Extraocular movements intact  Conjunctiva/sclera: Conjunctivae normal    Cardiovascular:      Rate and Rhythm: Normal rate  Pulses:           Radial pulses are 2+ on the right side and 2+ on the left side  Pulmonary:      Effort: Pulmonary effort is normal  No tachypnea or respiratory distress  Musculoskeletal:         General: Normal range of motion  Right wrist: Normal  No swelling, deformity or bony tenderness  Normal range of motion  Normal pulse  Left wrist: Normal       Right hand: Laceration present  No swelling, deformity or bony tenderness  Normal range of motion  Normal strength  Normal sensation  There is no disruption of two-point discrimination  Normal capillary refill  Normal pulse  Left hand: Normal         Hands:       Cervical back: Normal range of motion and neck supple        Comments: 0 2 cm puncture wound to the webspace between the third and fourth digits on the palmar aspect of the right hand  0 1 cm exit wound on the dorsal aspect of the right hand  Skin:     General: Skin is warm and dry  Capillary Refill: Capillary refill takes less than 2 seconds  Findings: No rash  Comments: See musculoskeletal documentation   Neurological:      General: No focal deficit present  Mental Status: She is alert and oriented to person, place, and time  Mental status is at baseline  Sensory: Sensation is intact  Motor: Motor function is intact  Vital Signs  ED Triage Vitals [06/14/23 2045]   Temperature Pulse Respirations Blood Pressure SpO2   98 4 °F (36 9 °C) 104 19 103/79 98 %      Temp Source Heart Rate Source Patient Position - Orthostatic VS BP Location FiO2 (%)   Oral Monitor Sitting Left arm --      Pain Score       8           Vitals:    06/14/23 2045   BP: 103/79   Pulse: 104   Patient Position - Orthostatic VS: Sitting         Visual Acuity      ED Medications  Medications   tetanus-diphtheria-acellular pertussis (BOOSTRIX) IM injection 0 5 mL (0 5 mL Intramuscular Given 6/14/23 5063)       Diagnostic Studies  Results Reviewed     None                 No orders to display              Procedures  Procedures         ED Course  ED Course as of 06/15/23 0512   Wed Jun 14, 2023 2237 Triage vital signs stable                                             Medical Decision Making  DDx including but not limited to: Wound check, abrasion, laceration; considered but doubt deep structure involvement, foreign body, fracture    Patient is a 78-year-old female presenting for evaluation of puncture wound to the right hand webspace between the third and fourth digits  Patient's triage vital signs are stable  On initial examination, she is sitting comfortably in no acute distress    Physical exam reveals very small puncture wound to the palmar aspect between the third and fourth digit webspace with exit wound on the dorsal side  With traction no gaping of wound  Wound cleansed with Betadine and normal saline solution and no evidence of communication between palmar and dorsal aspect  Patient does not have an active Tdap for which it was updated  No indication for laceration repair  Given very small wound without involvement of deeper structures, no indication for oral antibiotic prophylaxis  Gauze dressing applied for scant bleeding after wound care performed  Given no bony tenderness, no indication for x-ray at this time  Patient with full strength and sensation in right hand  Plan for patient to follow-up with primary care in 2 days for wound recheck  Strict return precautions reviewed including signs or symptoms of infection  Patient is in agreement with plan  She has no further questions at this time  Patient is well-appearing, in no acute distress, and ambulatory with steady gait at time of discharge  Puncture wound of right hand without foreign body, initial encounter: acute illness or injury  Risk  OTC drugs  Prescription drug management  Disposition  Final diagnoses:   Puncture wound of right hand without foreign body, initial encounter     Time reflects when diagnosis was documented in both MDM as applicable and the Disposition within this note     Time User Action Codes Description Comment    6/14/2023 10:55 PM Juan Borges Add [[id number]] Puncture wound of right hand without foreign body, initial encounter       ED Disposition     ED Disposition   Discharge    Condition   Stable    Date/Time   Wed Jun 14, 2023 10:54 PM    Comment   Glendy Jo discharge to home/self care                 Follow-up Information     Follow up With Specialties Details Why Contact Info    Halle Garcia PA-C Family Medicine, Physician Assistant Schedule an appointment as soon as possible for a visit in 2 days For wound re-check [place] [address] [address] [facility]  [phone]            Discharge Medication List as of 6/14/2023 10:58 PM      CONTINUE these medications which have NOT CHANGED    Details   acetaminophen (TYLENOL) 325 mg tablet Take 650 mg by mouth every 4 (four) hours as needed for mild pain, Historical Med      ibuprofen (MOTRIN) 200 mg tablet Take 3 tablets (600 mg total) by mouth every 6 (six) hours as needed for mild pain, Starting Thu 5/18/2023, Until Sat 6/17/2023 at 2359, Normal      metoprolol succinate (TOPROL-XL) 25 mg 24 hr tablet Take 1 tablet daily, Normal      ondansetron (ZOFRAN) 4 mg tablet Take 1 tablet (4 mg total) by mouth every 6 (six) hours as needed for nausea or vomiting, Starting Fri 5/19/2023, Normal             No discharge procedures on file      PDMP Review     None          ED Provider  Electronically Signed by           Perri Alejandre  06/15/23 3691

## 2023-06-15 NOTE — DISCHARGE INSTRUCTIONS
Schedule an appointment with your primary care provider for reevaluation of the wound in 2 to 3 days  Observe your wound for any new redness, swelling, increased pain, warmth, or puslike drainage  Return to the ER if you develop fever, signs or symptoms of infection, new numbness or tingling, or muscle weakness

## 2023-08-22 ENCOUNTER — HOSPITAL ENCOUNTER (OUTPATIENT)
Dept: ULTRASOUND IMAGING | Facility: CLINIC | Age: 33
Discharge: HOME | End: 2023-08-22
Payer: COMMERCIAL

## 2023-08-22 ENCOUNTER — HOSPITAL ENCOUNTER (OUTPATIENT)
Dept: MAMMOGRAPHY | Facility: CLINIC | Age: 33
Discharge: HOME | End: 2023-08-22
Payer: COMMERCIAL

## 2023-08-22 DIAGNOSIS — N63.0 BREAST LUMP: ICD-10-CM

## 2023-08-22 DIAGNOSIS — N63.11 BREAST LUMP ON RIGHT SIDE AT 11 O'CLOCK POSITION: ICD-10-CM

## 2023-08-22 PROCEDURE — G0279 TOMOSYNTHESIS, MAMMO: HCPCS

## 2023-08-22 PROCEDURE — 76642 ULTRASOUND BREAST LIMITED: CPT

## 2023-08-22 PROCEDURE — 77066 DX MAMMO INCL CAD BI: CPT

## 2023-08-23 DIAGNOSIS — R00.2 PALPITATIONS: ICD-10-CM

## 2023-08-24 ENCOUNTER — TELEPHONE (OUTPATIENT)
Dept: FAMILY MEDICINE CLINIC | Facility: CLINIC | Age: 33
End: 2023-08-24

## 2023-08-24 RX ORDER — METOPROLOL SUCCINATE 25 MG/1
TABLET, EXTENDED RELEASE ORAL
Qty: 90 TABLET | Refills: 0 | OUTPATIENT
Start: 2023-08-24

## 2023-08-24 NOTE — TELEPHONE ENCOUNTER
Please call her for an appointment/annual physical.  I have not seen her since May 2022 but I got a refill request for metoprolol. Unfortunately I cannot refill that medication at this time since she has not been seen in over a year. Refills are usually not honored after 6 months if not seen.   Thank you

## 2023-08-25 ENCOUNTER — RA CDI HCC (OUTPATIENT)
Dept: OTHER | Facility: HOSPITAL | Age: 33
End: 2023-08-25

## 2023-08-25 NOTE — PROGRESS NOTES
[address] [facility] coding opportunities       Chart reviewed, no opportunity found: CHART REVIEWED, NO OPPORTUNITY FOUND        Patients Insurance        Commercial Insurance: Rajesh Bates

## 2023-08-30 ENCOUNTER — TELEPHONE (OUTPATIENT)
Dept: PSYCHIATRY | Facility: CLINIC | Age: 33
End: 2023-08-30

## 2023-08-30 NOTE — TELEPHONE ENCOUNTER
Sent Wait List Letter VIA [employer] . Verify patients need of services from wait list after 15 days.  If no communication remove from NON-REFERRAL wait list.

## 2023-08-30 NOTE — LETTER
Dear Eric Gannon : We are contacting you because your name is currently included on the [address] [place] [address] wait-list for Talk Therapy and/or Medication Management. (Please [place] which services are needed)     In our efforts to provide the highest quality care, [facility] has begun the process of upgrading our behavioral health systems to increase efficiency and expedite delivery of services. As part of this process, we ask you to please confirm your continued interest in the services above. If you are no longer interested or in need, please perri “No” in the area below. If you are still interested and in need, please perri “Yes” and provide your most current demographic and insurance information within 15 days. If we do not receive confirmation from you by 2023 your information will not be included in the system upgrade and your place on the waitlist will be lost.     Thank you in advance for your patience and understanding and we apologize for any inconvenience this may cause. Patient Name and :    Still in need of services: Yes or No     Current Address:     Phone#:     Best time to receive a call: Insurance Carrier:      Policy/ID#: Group#: Insurance Services Phone#:      What is your current presenting problem? Open to virtual talk therapy: Yes or No      We will call you to do an Intake when an appointment becomes available. You can send this information back to us in any of the ways below:    Email: [email or web address] [email or web address] Mooney  Fax#:  [phone]  Mail:   [address] [place] [address]             [facility], [address] [place] [address]
general

## 2024-05-10 ENCOUNTER — TELEPHONE (OUTPATIENT)
Dept: FAMILY MEDICINE CLINIC | Facility: CLINIC | Age: 34
End: 2024-05-10

## 2024-07-30 ENCOUNTER — OFFICE VISIT (OUTPATIENT)
Dept: FAMILY MEDICINE CLINIC | Facility: CLINIC | Age: 34
End: 2024-07-30
Payer: COMMERCIAL

## 2024-07-30 VITALS
WEIGHT: 202 LBS | RESPIRATION RATE: 18 BRPM | HEIGHT: 62 IN | TEMPERATURE: 98 F | BODY MASS INDEX: 37.17 KG/M2 | OXYGEN SATURATION: 96 % | HEART RATE: 64 BPM | SYSTOLIC BLOOD PRESSURE: 120 MMHG | DIASTOLIC BLOOD PRESSURE: 80 MMHG

## 2024-07-30 DIAGNOSIS — D72.819 LEUKOPENIA, UNSPECIFIED TYPE: ICD-10-CM

## 2024-07-30 DIAGNOSIS — R00.2 PALPITATIONS: ICD-10-CM

## 2024-07-30 DIAGNOSIS — K29.70 GASTRITIS WITHOUT BLEEDING, UNSPECIFIED CHRONICITY, UNSPECIFIED GASTRITIS TYPE: ICD-10-CM

## 2024-07-30 DIAGNOSIS — K21.9 GASTROESOPHAGEAL REFLUX DISEASE, UNSPECIFIED WHETHER ESOPHAGITIS PRESENT: Primary | ICD-10-CM

## 2024-07-30 DIAGNOSIS — I48.0 PAROXYSMAL ATRIAL FIBRILLATION (HCC): ICD-10-CM

## 2024-07-30 PROCEDURE — 3725F SCREEN DEPRESSION PERFORMED: CPT | Performed by: PHYSICIAN ASSISTANT

## 2024-07-30 PROCEDURE — 99214 OFFICE O/P EST MOD 30 MIN: CPT | Performed by: PHYSICIAN ASSISTANT

## 2024-07-30 RX ORDER — PANTOPRAZOLE SODIUM 40 MG/1
40 TABLET, DELAYED RELEASE ORAL
Qty: 30 TABLET | Refills: 5 | Status: SHIPPED | OUTPATIENT
Start: 2024-07-30 | End: 2025-01-26

## 2024-07-30 RX ORDER — SUCRALFATE 1 G/1
1 TABLET ORAL 4 TIMES DAILY
Qty: 40 TABLET | Refills: 0 | Status: SHIPPED | OUTPATIENT
Start: 2024-07-30

## 2024-07-30 RX ORDER — METOPROLOL SUCCINATE 25 MG/1
TABLET, EXTENDED RELEASE ORAL
Qty: 90 TABLET | Refills: 1 | Status: SHIPPED | OUTPATIENT
Start: 2024-07-30

## 2024-07-30 NOTE — PROGRESS NOTES
Ambulatory Visit  Name: Cornelio Paris      : 1990      MRN: [id number]  Encounter Provider: Halle Garcia PA-C  Encounter Date: 2024   [facility]    Assessment & Plan   1. Gastroesophageal reflux disease, unspecified whether esophagitis present  -     pantoprazole (PROTONIX) 40 mg tablet; Take 1 tablet (40 mg total) by mouth daily before breakfast  -     sucralfate (CARAFATE) 1 g tablet; Take 1 tablet (1 g total) by mouth 4 (four) times a day  -     CBC and differential  -     Comprehensive metabolic panel  2. Gastritis without bleeding, unspecified chronicity, unspecified gastritis type  -     pantoprazole (PROTONIX) 40 mg tablet; Take 1 tablet (40 mg total) by mouth daily before breakfast  -     sucralfate (CARAFATE) 1 g tablet; Take 1 tablet (1 g total) by mouth 4 (four) times a day  -     CBC and differential  -     Comprehensive metabolic panel  3. Leukopenia, unspecified type  -     CBC and differential  -     Comprehensive metabolic panel  4. Paroxysmal atrial fibrillation (HCC)  5. Palpitations  -     metoprolol succinate (TOPROL-XL) 25 mg 24 hr tablet; Take 1 tablet daily    -I did recommend she hold on any spicy, acidic, citrus, caffeinated, alcohol beverages.  Avoid taking any NSAIDs.  - Elevate head of bed with bricks or boards under the legs of the bed  - Start pantoprazole 40 mg daily on an empty stomach  - Carafate 1 g 4 times daily for 10 days.  I did advise her there might be a shortage of this medication at this time  -Proceed with a CMP and CBC.  Her last blood work was about a year ago.  Leukocytes were elevated at that time also.  -Restart metoprolol.  She is also been encouraged to make another appointment with her cardiologist for the paroxysmal atrial fibrillation.  - I did recommend follow-up in 4 to 6 weeks to include annual physical.  She has been advised to go to the ER with any increasing symptoms    M*Modal  "software was used to dictate this note. It may contain errors with dictating incorrect words/spelling. Please contact provider directly for any questions.        History of Present Illness     Patient presents today for routine follow-up for an evaluation of abdominal pain that started about a month ago.  She points to the pain being in the umbilical region especially after she eats certain foods.  She states the last episode of pain was yesterday after she ate a chicken burrito.  She states the pain was so intense that she almost went to the emergency room.  She denies any nausea, vomiting, fever, chills, changes in her bowels.  She states that she has normal consistency bowel movements on a daily basis.  She states for about a month she is also been experiencing heartburn symptoms/burning in her chest sometimes after eating and at bedtime.  She has not tried any medications over-the-counter.  She states that she is never had the symptoms in the past.  She is never had any abdominal surgeries.  She does not drink alcohol regularly.  She does not take any NSAIDs.  She also states has been several years since she has been on metoprolol for her paroxysmal atrial fibrillation.  She would like a refill.  She does notice intermittent palpitations.  She has not seen cardiology in a while.  She does plan on making appointment.        Review of Systems   Constitutional: Negative.    Respiratory:  Negative for shortness of breath.    Cardiovascular:  Positive for palpitations. Negative for chest pain and leg swelling.   Gastrointestinal:         As stated in HPI       Objective     /80   Pulse 64   Temp 98 °F (36.7 °C)   Resp 18   Ht 5' 2\" (1.575 m)   Wt 91.6 kg (202 lb)   LMP 05/09/2023   SpO2 96%   BMI 36.95 kg/m²     Physical Exam  Vitals reviewed.   Constitutional:       General: She is not in acute distress.     Appearance: Normal appearance. She is well-developed. She is not ill-appearing, toxic-appearing " or diaphoretic.   HENT:      Head: Normocephalic and atraumatic.   Neck:      Thyroid: No thyromegaly.   Cardiovascular:      Rate and Rhythm: Normal rate and regular rhythm.      Heart sounds: Normal heart sounds. No murmur heard.  Pulmonary:      Effort: Pulmonary effort is normal. No respiratory distress.      Breath sounds: Normal breath sounds. No wheezing, rhonchi or rales.   Abdominal:      General: Abdomen is flat. Bowel sounds are normal. There is no distension.      Palpations: Abdomen is soft. There is no mass.      Tenderness: There is no abdominal tenderness.   Musculoskeletal:         General: No deformity.      Cervical back: Neck supple.      Right lower leg: No edema.      Left lower leg: No edema.   Lymphadenopathy:      Cervical: No cervical adenopathy.   Skin:     General: Skin is warm.   Neurological:      General: No focal deficit present.      Mental Status: She is alert.   Psychiatric:         Mood and Affect: Mood normal.         Behavior: Behavior normal.         Thought Content: Thought content normal.         Judgment: Judgment normal.       Administrative Statements

## 2024-08-28 ENCOUNTER — RA CDI HCC (OUTPATIENT)
Dept: OTHER | Facility: HOSPITAL | Age: 34
End: 2024-08-28

## (undated) DEVICE — TROCAR: Brand: KII SLEEVE

## (undated) DEVICE — DRAPE EQUIPMENT RF WAND

## (undated) DEVICE — INTENDED FOR TISSUE SEPARATION, AND OTHER PROCEDURES THAT REQUIRE A SHARP SURGICAL BLADE TO PUNCTURE OR CUT.: Brand: BARD-PARKER SAFETY BLADES SIZE 11, STERILE

## (undated) DEVICE — CHLORAPREP HI-LITE 26ML ORANGE

## (undated) DEVICE — IV EXTENSION TUBING 33 IN

## (undated) DEVICE — GLOVE PI ULTRA TOUCH SZ.6.5

## (undated) DEVICE — TROCAR: Brand: KII FIOS FIRST ENTRY

## (undated) DEVICE — PENCIL ELECTROSURG E-Z CLEAN -0035H

## (undated) DEVICE — LIGHT HANDLE COVER SLEEVE DISP BLUE STELLAR

## (undated) DEVICE — SYRINGE 50ML LL

## (undated) DEVICE — INSUFFLATION NEEDLE TO ESTABLISH PNEUMOPERITONEUM.: Brand: INSUFFLATION NEEDLE

## (undated) DEVICE — SCD SEQUENTIAL COMPRESSION COMFORT SLEEVE MEDIUM KNEE LENGTH: Brand: KENDALL SCD

## (undated) DEVICE — [HIGH FLOW INSUFFLATOR,  DO NOT USE IF PACKAGE IS DAMAGED,  KEEP DRY,  KEEP AWAY FROM SUNLIGHT,  PROTECT FROM HEAT AND RADIOACTIVE SOURCES.]: Brand: PNEUMOSURE

## (undated) DEVICE — GLOVE INDICATOR PI UNDERGLOVE SZ 6.5 BLUE

## (undated) DEVICE — PVC URETHRAL CATHETER: Brand: DOVER

## (undated) DEVICE — PLASTIC ADHESIVE BANDAGE: Brand: CURITY

## (undated) DEVICE — ENSEAL X1 TISSUE SEALER, CURVED JAW, 37 CM SHAFT LENGTH: Brand: ENSEAL

## (undated) DEVICE — BLUE HEAT SCOPE WARMER

## (undated) DEVICE — UTERINE MANIPULATOR RUMI 6.7 X 10 CM

## (undated) DEVICE — IRRIG ENDO FLO TUBING

## (undated) DEVICE — SUT STRATAFIX SPIRAL 2-0 CT-1 30 CM SXPP1B410

## (undated) DEVICE — SUT VICRYL 0 CT-1 36 IN J946H

## (undated) DEVICE — STRL ALLENTOWN HYSTEROSCOPY PK: Brand: CARDINAL HEALTH

## (undated) DEVICE — DECANTER: Brand: UNBRANDED

## (undated) DEVICE — UNDER BUTTOCKS DRAPE W/FLUID CONTROL POUCH: Brand: CONVERTORS

## (undated) DEVICE — ABSORBABLE WOUND CLOSURE DEVICE: Brand: V-LOC 90

## (undated) DEVICE — BETHLEHEM UNIVERSAL GYN LAP PK: Brand: CARDINAL HEALTH

## (undated) DEVICE — PREMIUM DRY TRAY LF: Brand: MEDLINE INDUSTRIES, INC.

## (undated) DEVICE — ELECTRODE LAP J HOOK E-Z CLEAN 33CM-0021

## (undated) DEVICE — OCCLUDER COLPO-PNEUMO

## (undated) DEVICE — SUT MONOCRYL 4-0 PS-2 27 IN Y426H

## (undated) DEVICE — TRAY FOLEY 16FR URIMETER SILICONE SURESTEP

## (undated) DEVICE — Device

## (undated) DEVICE — 40595 XL TRENDELENBURG POSITIONING KIT: Brand: 40595 XL TRENDELENBURG POSITIONING KIT

## (undated) DEVICE — 3000CC GUARDIAN II: Brand: GUARDIAN

## (undated) DEVICE — 3M™ STERI-DRAPE™ UNDER BUTTOCKS DRAPE WITH POUCH 1084: Brand: STERI-DRAPE™

## (undated) DEVICE — METZENBAUM ADTEC SINGLE USE DISSECTING SCISSORS, SHAFT ONLY, MONOPOLAR, CURVED TO LEFT, WORKING LENGTH: 12 1/4", (310 MM), DIAM. 5 MM, INSULATED, DOUBLE ACTION, STERILE, DISPOSABLE, PACKAGE OF 10 PIECES: Brand: AESCULAP

## (undated) DEVICE — MAYO STAND COVER: Brand: CONVERTORS

## (undated) DEVICE — CYSTO TUBING SINGLE IRRIGATION

## (undated) DEVICE — PREP PAD BNS: Brand: CONVERTORS

## (undated) DEVICE — LAPAROSCOPIC SMOKE EVAC TUBING

## (undated) DEVICE — 2000CC GUARDIAN II: Brand: GUARDIAN